# Patient Record
Sex: MALE | Race: ASIAN | NOT HISPANIC OR LATINO | Employment: UNEMPLOYED | URBAN - METROPOLITAN AREA
[De-identification: names, ages, dates, MRNs, and addresses within clinical notes are randomized per-mention and may not be internally consistent; named-entity substitution may affect disease eponyms.]

---

## 2017-01-04 ENCOUNTER — OFFICE VISIT - HEALTHEAST (OUTPATIENT)
Dept: FAMILY MEDICINE | Facility: CLINIC | Age: 57
End: 2017-01-04

## 2017-01-04 DIAGNOSIS — K59.00 CONSTIPATION: ICD-10-CM

## 2017-01-04 DIAGNOSIS — Z09 HOSPITAL DISCHARGE FOLLOW-UP: ICD-10-CM

## 2017-01-11 ENCOUNTER — OFFICE VISIT - HEALTHEAST (OUTPATIENT)
Dept: FAMILY MEDICINE | Facility: CLINIC | Age: 57
End: 2017-01-11

## 2017-01-11 DIAGNOSIS — M25.40 JOINT EFFUSION: ICD-10-CM

## 2017-01-17 ENCOUNTER — OFFICE VISIT - HEALTHEAST (OUTPATIENT)
Dept: FAMILY MEDICINE | Facility: CLINIC | Age: 57
End: 2017-01-17

## 2017-01-17 DIAGNOSIS — M19.049: ICD-10-CM

## 2017-01-17 DIAGNOSIS — Z23 NEED FOR IMMUNIZATION AGAINST INFLUENZA: ICD-10-CM

## 2017-01-17 ASSESSMENT — MIFFLIN-ST. JEOR: SCORE: 1436.35

## 2017-01-26 ENCOUNTER — COMMUNICATION - HEALTHEAST (OUTPATIENT)
Dept: SCHEDULING | Facility: CLINIC | Age: 57
End: 2017-01-26

## 2017-01-26 ENCOUNTER — OFFICE VISIT - HEALTHEAST (OUTPATIENT)
Dept: FAMILY MEDICINE | Facility: CLINIC | Age: 57
End: 2017-01-26

## 2017-01-26 DIAGNOSIS — M25.50 ARTHRALGIA: ICD-10-CM

## 2017-01-26 DIAGNOSIS — K21.9 GASTROESOPHAGEAL REFLUX DISEASE, ESOPHAGITIS PRESENCE NOT SPECIFIED: ICD-10-CM

## 2017-01-26 DIAGNOSIS — E55.9 VITAMIN D DEFICIENCY: ICD-10-CM

## 2017-01-26 ASSESSMENT — MIFFLIN-ST. JEOR: SCORE: 1429.55

## 2017-01-27 ENCOUNTER — COMMUNICATION - HEALTHEAST (OUTPATIENT)
Dept: FAMILY MEDICINE | Facility: CLINIC | Age: 57
End: 2017-01-27

## 2017-02-13 ENCOUNTER — OFFICE VISIT - HEALTHEAST (OUTPATIENT)
Dept: FAMILY MEDICINE | Facility: CLINIC | Age: 57
End: 2017-02-13

## 2017-02-13 DIAGNOSIS — M19.049: ICD-10-CM

## 2017-02-13 DIAGNOSIS — Z23 NEED FOR TDAP VACCINATION: ICD-10-CM

## 2017-02-22 ENCOUNTER — OFFICE VISIT - HEALTHEAST (OUTPATIENT)
Dept: FAMILY MEDICINE | Facility: CLINIC | Age: 57
End: 2017-02-22

## 2017-02-22 DIAGNOSIS — M19.049: ICD-10-CM

## 2017-03-16 ENCOUNTER — OFFICE VISIT - HEALTHEAST (OUTPATIENT)
Dept: FAMILY MEDICINE | Facility: CLINIC | Age: 57
End: 2017-03-16

## 2017-03-16 DIAGNOSIS — M25.50 JOINT PAIN: ICD-10-CM

## 2017-04-05 ENCOUNTER — OFFICE VISIT - HEALTHEAST (OUTPATIENT)
Dept: RHEUMATOLOGY | Facility: CLINIC | Age: 57
End: 2017-04-05

## 2017-04-05 DIAGNOSIS — M25.50 POLYARTHRALGIA: ICD-10-CM

## 2017-04-05 DIAGNOSIS — M79.642 PAIN OF LEFT HAND: ICD-10-CM

## 2017-04-05 ASSESSMENT — MIFFLIN-ST. JEOR: SCORE: 1417.3

## 2017-04-06 LAB — HCV AB SERPL QL IA: NEGATIVE

## 2017-04-24 ENCOUNTER — OFFICE VISIT - HEALTHEAST (OUTPATIENT)
Dept: FAMILY MEDICINE | Facility: CLINIC | Age: 57
End: 2017-04-24

## 2017-04-24 DIAGNOSIS — K21.9 GASTROESOPHAGEAL REFLUX DISEASE, ESOPHAGITIS PRESENCE NOT SPECIFIED: ICD-10-CM

## 2017-04-24 DIAGNOSIS — K76.0 HEPATIC STEATOSIS: ICD-10-CM

## 2017-04-24 DIAGNOSIS — R10.11 RIGHT UPPER QUADRANT ABDOMINAL PAIN: ICD-10-CM

## 2017-04-25 LAB — HBV SURFACE AB SERPL IA-ACNC: POSITIVE M[IU]/ML

## 2017-05-01 LAB — HAV IGG SER QL IA: POSITIVE

## 2017-05-03 ENCOUNTER — OFFICE VISIT - HEALTHEAST (OUTPATIENT)
Dept: RHEUMATOLOGY | Facility: CLINIC | Age: 57
End: 2017-05-03

## 2017-05-03 DIAGNOSIS — M06.4 INFLAMMATORY POLYARTHROPATHY OF HAND (H): ICD-10-CM

## 2017-05-03 DIAGNOSIS — M79.642 PAIN OF LEFT HAND: ICD-10-CM

## 2017-05-03 DIAGNOSIS — M25.50 POLYARTHRALGIA: ICD-10-CM

## 2017-05-05 ENCOUNTER — HOSPITAL ENCOUNTER (OUTPATIENT)
Dept: ULTRASOUND IMAGING | Facility: HOSPITAL | Age: 57
Discharge: HOME | End: 2017-05-05
Attending: FAMILY MEDICINE

## 2017-05-05 DIAGNOSIS — R10.11 RIGHT UPPER QUADRANT ABDOMINAL PAIN: ICD-10-CM

## 2017-05-05 DIAGNOSIS — K76.0 HEPATIC STEATOSIS: ICD-10-CM

## 2017-05-10 ENCOUNTER — COMMUNICATION - HEALTHEAST (OUTPATIENT)
Dept: FAMILY MEDICINE | Facility: CLINIC | Age: 57
End: 2017-05-10

## 2017-05-17 ENCOUNTER — OFFICE VISIT - HEALTHEAST (OUTPATIENT)
Dept: FAMILY MEDICINE | Facility: CLINIC | Age: 57
End: 2017-05-17

## 2017-05-17 DIAGNOSIS — E78.1 HYPERTRIGLYCERIDEMIA: ICD-10-CM

## 2017-05-17 DIAGNOSIS — K76.0 HEPATIC STEATOSIS: ICD-10-CM

## 2017-05-17 DIAGNOSIS — K29.70 GASTRITIS: ICD-10-CM

## 2017-05-17 LAB
CHOLEST SERPL-MCNC: 185 MG/DL
FASTING STATUS PATIENT QL REPORTED: YES
HDLC SERPL-MCNC: 39 MG/DL
LDLC SERPL CALC-MCNC: 83 MG/DL
TRIGL SERPL-MCNC: 313 MG/DL

## 2017-05-25 ENCOUNTER — OFFICE VISIT - HEALTHEAST (OUTPATIENT)
Dept: FAMILY MEDICINE | Facility: CLINIC | Age: 57
End: 2017-05-25

## 2017-05-25 DIAGNOSIS — R10.11 RIGHT UPPER QUADRANT ABDOMINAL PAIN: ICD-10-CM

## 2017-05-25 DIAGNOSIS — Z12.11 COLON CANCER SCREENING: ICD-10-CM

## 2017-06-01 ENCOUNTER — RECORDS - HEALTHEAST (OUTPATIENT)
Dept: ADMINISTRATIVE | Facility: OTHER | Age: 57
End: 2017-06-01

## 2017-06-02 ENCOUNTER — RECORDS - HEALTHEAST (OUTPATIENT)
Dept: ADMINISTRATIVE | Facility: OTHER | Age: 57
End: 2017-06-02

## 2017-06-12 ENCOUNTER — OFFICE VISIT - HEALTHEAST (OUTPATIENT)
Dept: FAMILY MEDICINE | Facility: CLINIC | Age: 57
End: 2017-06-12

## 2017-06-12 DIAGNOSIS — D12.6 ADENOMATOUS COLON POLYP: ICD-10-CM

## 2017-06-12 DIAGNOSIS — M06.4 INFLAMMATORY POLYARTHROPATHY OF HAND (H): ICD-10-CM

## 2017-06-12 DIAGNOSIS — K59.00 CONSTIPATION: ICD-10-CM

## 2017-06-12 ASSESSMENT — MIFFLIN-ST. JEOR: SCORE: 1481.71

## 2017-08-07 ENCOUNTER — OFFICE VISIT - HEALTHEAST (OUTPATIENT)
Dept: RHEUMATOLOGY | Facility: CLINIC | Age: 57
End: 2017-08-07

## 2017-08-07 DIAGNOSIS — M06.4 INFLAMMATORY POLYARTHROPATHY OF HAND (H): ICD-10-CM

## 2017-08-07 ASSESSMENT — MIFFLIN-ST. JEOR: SCORE: 1481.71

## 2017-09-11 ENCOUNTER — RECORDS - HEALTHEAST (OUTPATIENT)
Dept: ADMINISTRATIVE | Facility: OTHER | Age: 57
End: 2017-09-11

## 2017-09-27 ENCOUNTER — OFFICE VISIT - HEALTHEAST (OUTPATIENT)
Dept: FAMILY MEDICINE | Facility: CLINIC | Age: 57
End: 2017-09-27

## 2017-09-27 DIAGNOSIS — E55.9 VITAMIN D DEFICIENCY: ICD-10-CM

## 2017-09-27 DIAGNOSIS — K29.70 GASTRITIS: ICD-10-CM

## 2017-09-27 DIAGNOSIS — K59.00 CONSTIPATION: ICD-10-CM

## 2017-09-27 DIAGNOSIS — E78.1 HYPERTRIGLYCERIDEMIA: ICD-10-CM

## 2017-09-27 DIAGNOSIS — D12.6 ADENOMATOUS COLON POLYP: ICD-10-CM

## 2017-09-28 ENCOUNTER — AMBULATORY - HEALTHEAST (OUTPATIENT)
Dept: LAB | Facility: CLINIC | Age: 57
End: 2017-09-28

## 2017-09-28 DIAGNOSIS — E78.1 HYPERTRIGLYCERIDEMIA: ICD-10-CM

## 2017-09-28 LAB
CHOLEST SERPL-MCNC: 180 MG/DL
FASTING STATUS PATIENT QL REPORTED: YES
HDLC SERPL-MCNC: 44 MG/DL
LDLC SERPL CALC-MCNC: 91 MG/DL
TRIGL SERPL-MCNC: 227 MG/DL

## 2017-10-02 ENCOUNTER — AMBULATORY - HEALTHEAST (OUTPATIENT)
Dept: FAMILY MEDICINE | Facility: CLINIC | Age: 57
End: 2017-10-02

## 2017-10-02 DIAGNOSIS — K59.00 CONSTIPATION: ICD-10-CM

## 2017-10-02 DIAGNOSIS — E55.9 VITAMIN D DEFICIENCY: ICD-10-CM

## 2017-10-17 ENCOUNTER — COMMUNICATION - HEALTHEAST (OUTPATIENT)
Dept: FAMILY MEDICINE | Facility: CLINIC | Age: 57
End: 2017-10-17

## 2017-11-09 ENCOUNTER — COMMUNICATION - HEALTHEAST (OUTPATIENT)
Dept: RHEUMATOLOGY | Facility: CLINIC | Age: 57
End: 2017-11-09

## 2017-11-09 DIAGNOSIS — M06.4 INFLAMMATORY POLYARTHROPATHY OF HAND (H): ICD-10-CM

## 2017-12-14 ENCOUNTER — OFFICE VISIT - HEALTHEAST (OUTPATIENT)
Dept: FAMILY MEDICINE | Facility: CLINIC | Age: 57
End: 2017-12-14

## 2017-12-14 DIAGNOSIS — K62.5 RECTAL BLEEDING: ICD-10-CM

## 2017-12-14 DIAGNOSIS — K64.5 THROMBOSED EXTERNAL HEMORRHOID: ICD-10-CM

## 2017-12-14 ASSESSMENT — MIFFLIN-ST. JEOR: SCORE: 1517.99

## 2017-12-15 ENCOUNTER — RECORDS - HEALTHEAST (OUTPATIENT)
Dept: ADMINISTRATIVE | Facility: OTHER | Age: 57
End: 2017-12-15

## 2017-12-30 ENCOUNTER — COMMUNICATION - HEALTHEAST (OUTPATIENT)
Dept: FAMILY MEDICINE | Facility: CLINIC | Age: 57
End: 2017-12-30

## 2017-12-30 DIAGNOSIS — K59.00 CONSTIPATION: ICD-10-CM

## 2018-01-04 ENCOUNTER — COMMUNICATION - HEALTHEAST (OUTPATIENT)
Dept: FAMILY MEDICINE | Facility: CLINIC | Age: 58
End: 2018-01-04

## 2018-01-04 DIAGNOSIS — K59.00 CONSTIPATION: ICD-10-CM

## 2018-01-15 ENCOUNTER — RECORDS - HEALTHEAST (OUTPATIENT)
Dept: ADMINISTRATIVE | Facility: OTHER | Age: 58
End: 2018-01-15

## 2018-02-05 ENCOUNTER — OFFICE VISIT - HEALTHEAST (OUTPATIENT)
Dept: RHEUMATOLOGY | Facility: CLINIC | Age: 58
End: 2018-02-05

## 2018-02-05 DIAGNOSIS — M06.4 INFLAMMATORY POLYARTHROPATHY OF HAND (H): ICD-10-CM

## 2018-02-05 DIAGNOSIS — M25.50 POLYARTHRALGIA: ICD-10-CM

## 2018-02-05 ASSESSMENT — MIFFLIN-ST. JEOR: SCORE: 1517.99

## 2018-02-14 ENCOUNTER — OFFICE VISIT - HEALTHEAST (OUTPATIENT)
Dept: FAMILY MEDICINE | Facility: CLINIC | Age: 58
End: 2018-02-14

## 2018-02-14 DIAGNOSIS — K21.9 GASTROESOPHAGEAL REFLUX DISEASE, ESOPHAGITIS PRESENCE NOT SPECIFIED: ICD-10-CM

## 2018-02-14 DIAGNOSIS — H54.7 DECREASED VISION: ICD-10-CM

## 2018-02-14 DIAGNOSIS — M25.50 POLYARTHRALGIA: ICD-10-CM

## 2018-03-09 ENCOUNTER — RECORDS - HEALTHEAST (OUTPATIENT)
Dept: ADMINISTRATIVE | Facility: OTHER | Age: 58
End: 2018-03-09

## 2018-03-29 ENCOUNTER — RECORDS - HEALTHEAST (OUTPATIENT)
Dept: ADMINISTRATIVE | Facility: OTHER | Age: 58
End: 2018-03-29

## 2018-04-03 ENCOUNTER — OFFICE VISIT - HEALTHEAST (OUTPATIENT)
Dept: FAMILY MEDICINE | Facility: CLINIC | Age: 58
End: 2018-04-03

## 2018-04-03 DIAGNOSIS — E55.9 VITAMIN D DEFICIENCY: ICD-10-CM

## 2018-04-03 DIAGNOSIS — M06.4 INFLAMMATORY POLYARTHROPATHY OF HAND (H): ICD-10-CM

## 2018-04-03 DIAGNOSIS — K29.70 GASTRITIS: ICD-10-CM

## 2018-04-03 DIAGNOSIS — M17.11 PRIMARY OSTEOARTHRITIS OF RIGHT KNEE: ICD-10-CM

## 2018-04-03 DIAGNOSIS — M17.12 PRIMARY OSTEOARTHRITIS OF LEFT KNEE: ICD-10-CM

## 2018-04-30 ENCOUNTER — OFFICE VISIT - HEALTHEAST (OUTPATIENT)
Dept: FAMILY MEDICINE | Facility: CLINIC | Age: 58
End: 2018-04-30

## 2018-04-30 DIAGNOSIS — M67.911 ROTATOR CUFF DISORDER, RIGHT: ICD-10-CM

## 2018-04-30 DIAGNOSIS — M25.50 POLYARTHRALGIA: ICD-10-CM

## 2018-05-22 ENCOUNTER — RECORDS - HEALTHEAST (OUTPATIENT)
Dept: GENERAL RADIOLOGY | Facility: CLINIC | Age: 58
End: 2018-05-22

## 2018-05-22 ENCOUNTER — OFFICE VISIT - HEALTHEAST (OUTPATIENT)
Dept: FAMILY MEDICINE | Facility: CLINIC | Age: 58
End: 2018-05-22

## 2018-05-22 DIAGNOSIS — M67.911 ROTATOR CUFF DISORDER, RIGHT: ICD-10-CM

## 2018-05-22 DIAGNOSIS — M67.911 UNSPECIFIED DISORDER OF SYNOVIUM AND TENDON, RIGHT SHOULDER: ICD-10-CM

## 2018-06-01 ENCOUNTER — OFFICE VISIT - HEALTHEAST (OUTPATIENT)
Dept: PHYSICAL THERAPY | Facility: REHABILITATION | Age: 58
End: 2018-06-01

## 2018-06-01 DIAGNOSIS — R20.0 RIGHT ARM NUMBNESS: ICD-10-CM

## 2018-06-01 DIAGNOSIS — M79.601 RIGHT ARM PAIN: ICD-10-CM

## 2018-06-01 DIAGNOSIS — M62.81 GENERALIZED MUSCLE WEAKNESS: ICD-10-CM

## 2018-06-01 DIAGNOSIS — R29.3 POOR POSTURE: ICD-10-CM

## 2018-06-07 ENCOUNTER — OFFICE VISIT - HEALTHEAST (OUTPATIENT)
Dept: RHEUMATOLOGY | Facility: CLINIC | Age: 58
End: 2018-06-07

## 2018-06-07 DIAGNOSIS — M06.4 INFLAMMATORY POLYARTHROPATHY OF HAND (H): ICD-10-CM

## 2018-06-07 DIAGNOSIS — M77.8 RIGHT SHOULDER TENDINITIS: ICD-10-CM

## 2018-06-08 ENCOUNTER — OFFICE VISIT - HEALTHEAST (OUTPATIENT)
Dept: PHYSICAL THERAPY | Facility: REHABILITATION | Age: 58
End: 2018-06-08

## 2018-06-08 DIAGNOSIS — M62.81 GENERALIZED MUSCLE WEAKNESS: ICD-10-CM

## 2018-06-08 DIAGNOSIS — R29.3 POOR POSTURE: ICD-10-CM

## 2018-06-08 DIAGNOSIS — R20.0 RIGHT ARM NUMBNESS: ICD-10-CM

## 2018-06-08 DIAGNOSIS — M79.601 RIGHT ARM PAIN: ICD-10-CM

## 2018-06-15 ENCOUNTER — OFFICE VISIT - HEALTHEAST (OUTPATIENT)
Dept: PHYSICAL THERAPY | Facility: REHABILITATION | Age: 58
End: 2018-06-15

## 2018-06-15 DIAGNOSIS — M62.81 GENERALIZED MUSCLE WEAKNESS: ICD-10-CM

## 2018-06-15 DIAGNOSIS — R29.3 POOR POSTURE: ICD-10-CM

## 2018-06-15 DIAGNOSIS — R20.0 RIGHT ARM NUMBNESS: ICD-10-CM

## 2018-06-15 DIAGNOSIS — M79.601 RIGHT ARM PAIN: ICD-10-CM

## 2018-06-29 ENCOUNTER — OFFICE VISIT - HEALTHEAST (OUTPATIENT)
Dept: PHYSICAL THERAPY | Facility: REHABILITATION | Age: 58
End: 2018-06-29

## 2018-06-29 DIAGNOSIS — R20.0 RIGHT ARM NUMBNESS: ICD-10-CM

## 2018-06-29 DIAGNOSIS — R29.3 POOR POSTURE: ICD-10-CM

## 2018-06-29 DIAGNOSIS — M62.81 GENERALIZED MUSCLE WEAKNESS: ICD-10-CM

## 2018-06-29 DIAGNOSIS — M79.601 RIGHT ARM PAIN: ICD-10-CM

## 2018-08-06 ENCOUNTER — COMMUNICATION - HEALTHEAST (OUTPATIENT)
Dept: RHEUMATOLOGY | Facility: CLINIC | Age: 58
End: 2018-08-06

## 2018-08-06 DIAGNOSIS — M25.50 POLYARTHRALGIA: ICD-10-CM

## 2018-08-06 DIAGNOSIS — M06.4 INFLAMMATORY POLYARTHROPATHY OF HAND (H): ICD-10-CM

## 2018-08-09 ENCOUNTER — COMMUNICATION - HEALTHEAST (OUTPATIENT)
Dept: FAMILY MEDICINE | Facility: CLINIC | Age: 58
End: 2018-08-09

## 2018-08-09 DIAGNOSIS — M06.4 INFLAMMATORY POLYARTHROPATHY OF HAND (H): ICD-10-CM

## 2018-08-13 ENCOUNTER — COMMUNICATION - HEALTHEAST (OUTPATIENT)
Dept: RHEUMATOLOGY | Facility: CLINIC | Age: 58
End: 2018-08-13

## 2018-08-13 DIAGNOSIS — M06.4 INFLAMMATORY POLYARTHROPATHY OF HAND (H): ICD-10-CM

## 2018-08-13 DIAGNOSIS — M25.50 POLYARTHRALGIA: ICD-10-CM

## 2018-08-14 ENCOUNTER — RECORDS - HEALTHEAST (OUTPATIENT)
Dept: ADMINISTRATIVE | Facility: OTHER | Age: 58
End: 2018-08-14

## 2018-09-06 ENCOUNTER — OFFICE VISIT - HEALTHEAST (OUTPATIENT)
Dept: RHEUMATOLOGY | Facility: CLINIC | Age: 58
End: 2018-09-06

## 2018-09-06 DIAGNOSIS — M25.50 POLYARTHRALGIA: ICD-10-CM

## 2018-09-06 DIAGNOSIS — M06.4 INFLAMMATORY POLYARTHROPATHY OF HAND (H): ICD-10-CM

## 2018-09-06 LAB
ALBUMIN SERPL-MCNC: 4.3 G/DL (ref 3.5–5)
ALT SERPL W P-5'-P-CCNC: 26 U/L (ref 0–45)
CREAT SERPL-MCNC: 1.13 MG/DL (ref 0.7–1.3)
ERYTHROCYTE [DISTWIDTH] IN BLOOD BY AUTOMATED COUNT: 11.5 % (ref 11–14.5)
GFR SERPL CREATININE-BSD FRML MDRD: >60 ML/MIN/1.73M2
HCT VFR BLD AUTO: 43 % (ref 40–54)
HGB BLD-MCNC: 14.8 G/DL (ref 14–18)
MCH RBC QN AUTO: 29.1 PG (ref 27–34)
MCHC RBC AUTO-ENTMCNC: 34.4 G/DL (ref 32–36)
MCV RBC AUTO: 84 FL (ref 80–100)
PLATELET # BLD AUTO: 179 THOU/UL (ref 140–440)
PMV BLD AUTO: 9.3 FL (ref 7–10)
RBC # BLD AUTO: 5.08 MILL/UL (ref 4.4–6.2)
WBC: 5.1 THOU/UL (ref 4–11)

## 2018-10-08 ENCOUNTER — COMMUNICATION - HEALTHEAST (OUTPATIENT)
Dept: FAMILY MEDICINE | Facility: CLINIC | Age: 58
End: 2018-10-08

## 2018-10-08 DIAGNOSIS — K59.00 CONSTIPATION: ICD-10-CM

## 2018-10-09 ENCOUNTER — OFFICE VISIT - HEALTHEAST (OUTPATIENT)
Dept: FAMILY MEDICINE | Facility: CLINIC | Age: 58
End: 2018-10-09

## 2018-10-09 DIAGNOSIS — D12.6 ADENOMATOUS COLON POLYP: ICD-10-CM

## 2018-10-09 DIAGNOSIS — M25.50 POLYARTHRALGIA: ICD-10-CM

## 2018-10-09 DIAGNOSIS — K29.70 GASTRITIS: ICD-10-CM

## 2018-10-09 DIAGNOSIS — M06.4 INFLAMMATORY POLYARTHROPATHY OF HAND (H): ICD-10-CM

## 2018-11-12 ENCOUNTER — OFFICE VISIT - HEALTHEAST (OUTPATIENT)
Dept: FAMILY MEDICINE | Facility: CLINIC | Age: 58
End: 2018-11-12

## 2018-11-12 DIAGNOSIS — D12.6 ADENOMATOUS POLYP OF COLON, UNSPECIFIED PART OF COLON: ICD-10-CM

## 2018-11-12 DIAGNOSIS — E55.9 VITAMIN D DEFICIENCY: ICD-10-CM

## 2018-11-12 DIAGNOSIS — K76.0 HEPATIC STEATOSIS: ICD-10-CM

## 2018-11-12 DIAGNOSIS — M06.4 INFLAMMATORY POLYARTHROPATHY OF HAND (H): ICD-10-CM

## 2018-11-12 DIAGNOSIS — K59.00 CONSTIPATION, UNSPECIFIED CONSTIPATION TYPE: ICD-10-CM

## 2018-11-12 DIAGNOSIS — Z00.00 ROUTINE GENERAL MEDICAL EXAMINATION AT A HEALTH CARE FACILITY: ICD-10-CM

## 2018-11-12 DIAGNOSIS — K29.50 CHRONIC GASTRITIS WITHOUT BLEEDING, UNSPECIFIED GASTRITIS TYPE: ICD-10-CM

## 2018-11-12 LAB
ALBUMIN SERPL-MCNC: 4.1 G/DL (ref 3.5–5)
ALP SERPL-CCNC: 61 U/L (ref 45–120)
ALT SERPL W P-5'-P-CCNC: 31 U/L (ref 0–45)
ANION GAP SERPL CALCULATED.3IONS-SCNC: 10 MMOL/L (ref 5–18)
AST SERPL W P-5'-P-CCNC: 23 U/L (ref 0–40)
BILIRUB SERPL-MCNC: 0.5 MG/DL (ref 0–1)
BUN SERPL-MCNC: 15 MG/DL (ref 8–22)
CALCIUM SERPL-MCNC: 9.4 MG/DL (ref 8.5–10.5)
CHLORIDE BLD-SCNC: 106 MMOL/L (ref 98–107)
CHOLEST SERPL-MCNC: 167 MG/DL
CO2 SERPL-SCNC: 24 MMOL/L (ref 22–31)
CREAT SERPL-MCNC: 1.07 MG/DL (ref 0.7–1.3)
ERYTHROCYTE [DISTWIDTH] IN BLOOD BY AUTOMATED COUNT: 12.3 % (ref 11–14.5)
FASTING STATUS PATIENT QL REPORTED: YES
GFR SERPL CREATININE-BSD FRML MDRD: >60 ML/MIN/1.73M2
GLUCOSE BLD-MCNC: 95 MG/DL (ref 70–125)
HCT VFR BLD AUTO: 43.5 % (ref 40–54)
HDLC SERPL-MCNC: 31 MG/DL
HGB BLD-MCNC: 14.2 G/DL (ref 14–18)
LDLC SERPL CALC-MCNC: 81 MG/DL
MCH RBC QN AUTO: 27.9 PG (ref 27–34)
MCHC RBC AUTO-ENTMCNC: 32.8 G/DL (ref 32–36)
MCV RBC AUTO: 85 FL (ref 80–100)
PLATELET # BLD AUTO: 169 THOU/UL (ref 140–440)
PMV BLD AUTO: 10.4 FL (ref 7–10)
POTASSIUM BLD-SCNC: 4.3 MMOL/L (ref 3.5–5)
PROT SERPL-MCNC: 7.1 G/DL (ref 6–8)
PSA SERPL-MCNC: 1.5 NG/ML (ref 0–3.5)
RBC # BLD AUTO: 5.1 MILL/UL (ref 4.4–6.2)
SODIUM SERPL-SCNC: 140 MMOL/L (ref 136–145)
TRIGL SERPL-MCNC: 274 MG/DL
WBC: 6 THOU/UL (ref 4–11)

## 2018-11-12 ASSESSMENT — MIFFLIN-ST. JEOR: SCORE: 1508.92

## 2018-11-13 LAB — 25(OH)D3 SERPL-MCNC: 46.9 NG/ML (ref 30–80)

## 2019-01-10 ENCOUNTER — OFFICE VISIT - HEALTHEAST (OUTPATIENT)
Dept: RHEUMATOLOGY | Facility: CLINIC | Age: 59
End: 2019-01-10

## 2019-01-10 DIAGNOSIS — M77.8 RIGHT SHOULDER TENDINITIS: ICD-10-CM

## 2019-01-10 DIAGNOSIS — M25.50 POLYARTHRALGIA: ICD-10-CM

## 2019-01-10 DIAGNOSIS — M06.4 INFLAMMATORY POLYARTHROPATHY OF HAND (H): ICD-10-CM

## 2019-01-10 DIAGNOSIS — G56.01 RIGHT CARPAL TUNNEL SYNDROME: ICD-10-CM

## 2019-02-25 ENCOUNTER — COMMUNICATION - HEALTHEAST (OUTPATIENT)
Dept: FAMILY MEDICINE | Facility: CLINIC | Age: 59
End: 2019-02-25

## 2019-02-25 DIAGNOSIS — K29.70 GASTRITIS: ICD-10-CM

## 2019-04-01 ENCOUNTER — COMMUNICATION - HEALTHEAST (OUTPATIENT)
Dept: FAMILY MEDICINE | Facility: CLINIC | Age: 59
End: 2019-04-01

## 2019-04-01 DIAGNOSIS — M06.4 INFLAMMATORY POLYARTHROPATHY OF HAND (H): ICD-10-CM

## 2019-04-02 ENCOUNTER — OFFICE VISIT - HEALTHEAST (OUTPATIENT)
Dept: RHEUMATOLOGY | Facility: CLINIC | Age: 59
End: 2019-04-02

## 2019-04-02 DIAGNOSIS — M77.8 RIGHT SHOULDER TENDINITIS: ICD-10-CM

## 2019-04-02 DIAGNOSIS — M06.4 INFLAMMATORY POLYARTHROPATHY OF HAND (H): ICD-10-CM

## 2019-04-02 DIAGNOSIS — M25.50 POLYARTHRALGIA: ICD-10-CM

## 2019-04-02 DIAGNOSIS — G56.01 RIGHT CARPAL TUNNEL SYNDROME: ICD-10-CM

## 2019-04-04 ENCOUNTER — COMMUNICATION - HEALTHEAST (OUTPATIENT)
Dept: SCHEDULING | Facility: CLINIC | Age: 59
End: 2019-04-04

## 2019-04-08 ENCOUNTER — OFFICE VISIT - HEALTHEAST (OUTPATIENT)
Dept: FAMILY MEDICINE | Facility: CLINIC | Age: 59
End: 2019-04-08

## 2019-04-08 DIAGNOSIS — M25.50 POLYARTHRALGIA: ICD-10-CM

## 2019-04-08 DIAGNOSIS — R07.89 CHEST WALL PAIN: ICD-10-CM

## 2019-04-11 ENCOUNTER — COMMUNICATION - HEALTHEAST (OUTPATIENT)
Dept: FAMILY MEDICINE | Facility: CLINIC | Age: 59
End: 2019-04-11

## 2019-04-11 DIAGNOSIS — E55.9 VITAMIN D DEFICIENCY: ICD-10-CM

## 2019-04-15 ENCOUNTER — RECORDS - HEALTHEAST (OUTPATIENT)
Dept: ADMINISTRATIVE | Facility: OTHER | Age: 59
End: 2019-04-15

## 2019-05-07 ENCOUNTER — OFFICE VISIT - HEALTHEAST (OUTPATIENT)
Dept: FAMILY MEDICINE | Facility: CLINIC | Age: 59
End: 2019-05-07

## 2019-05-07 DIAGNOSIS — K29.50 CHRONIC GASTRITIS WITHOUT BLEEDING, UNSPECIFIED GASTRITIS TYPE: ICD-10-CM

## 2019-05-07 DIAGNOSIS — D12.6 ADENOMATOUS POLYP OF COLON, UNSPECIFIED PART OF COLON: ICD-10-CM

## 2019-05-08 LAB
H PYLORI AG STL QL IA: NORMAL
REPORT STATUS: NORMAL
SPECIMEN DESCRIPTION: NORMAL

## 2019-05-13 ENCOUNTER — COMMUNICATION - HEALTHEAST (OUTPATIENT)
Dept: FAMILY MEDICINE | Facility: CLINIC | Age: 59
End: 2019-05-13

## 2019-05-13 DIAGNOSIS — K29.50 CHRONIC GASTRITIS WITHOUT BLEEDING, UNSPECIFIED GASTRITIS TYPE: ICD-10-CM

## 2019-06-24 ENCOUNTER — OFFICE VISIT - HEALTHEAST (OUTPATIENT)
Dept: FAMILY MEDICINE | Facility: CLINIC | Age: 59
End: 2019-06-24

## 2019-06-24 DIAGNOSIS — K21.9 GASTROESOPHAGEAL REFLUX DISEASE, ESOPHAGITIS PRESENCE NOT SPECIFIED: ICD-10-CM

## 2019-06-24 DIAGNOSIS — K59.00 CONSTIPATION, UNSPECIFIED CONSTIPATION TYPE: ICD-10-CM

## 2019-07-30 ENCOUNTER — OFFICE VISIT - HEALTHEAST (OUTPATIENT)
Dept: FAMILY MEDICINE | Facility: CLINIC | Age: 59
End: 2019-07-30

## 2019-07-30 DIAGNOSIS — R21 RASH OF NECK: ICD-10-CM

## 2019-07-30 DIAGNOSIS — R10.9 ABDOMINAL PAIN, UNSPECIFIED ABDOMINAL LOCATION: ICD-10-CM

## 2019-07-30 DIAGNOSIS — K21.9 GASTROESOPHAGEAL REFLUX DISEASE, ESOPHAGITIS PRESENCE NOT SPECIFIED: ICD-10-CM

## 2019-08-05 ENCOUNTER — OFFICE VISIT - HEALTHEAST (OUTPATIENT)
Dept: RHEUMATOLOGY | Facility: CLINIC | Age: 59
End: 2019-08-05

## 2019-08-05 DIAGNOSIS — M06.4 INFLAMMATORY POLYARTHROPATHY OF HAND (H): ICD-10-CM

## 2019-08-05 DIAGNOSIS — M25.50 POLYARTHRALGIA: ICD-10-CM

## 2019-08-05 ASSESSMENT — MIFFLIN-ST. JEOR: SCORE: 1443.15

## 2019-09-06 ENCOUNTER — RECORDS - HEALTHEAST (OUTPATIENT)
Dept: ADMINISTRATIVE | Facility: OTHER | Age: 59
End: 2019-09-06

## 2019-09-09 ENCOUNTER — COMMUNICATION - HEALTHEAST (OUTPATIENT)
Dept: ADMINISTRATIVE | Facility: CLINIC | Age: 59
End: 2019-09-09

## 2019-09-26 ENCOUNTER — OFFICE VISIT - HEALTHEAST (OUTPATIENT)
Dept: RHEUMATOLOGY | Facility: CLINIC | Age: 59
End: 2019-09-26

## 2019-09-26 DIAGNOSIS — Z79.899 HIGH RISK MEDICATION USE: ICD-10-CM

## 2019-09-26 DIAGNOSIS — R25.1 TREMOR: ICD-10-CM

## 2019-09-26 DIAGNOSIS — M25.50 POLYARTHRALGIA: ICD-10-CM

## 2019-09-26 DIAGNOSIS — M06.4 INFLAMMATORY POLYARTHROPATHY OF HAND (H): ICD-10-CM

## 2019-09-26 ASSESSMENT — MIFFLIN-ST. JEOR: SCORE: 1443.15

## 2019-10-07 ENCOUNTER — COMMUNICATION - HEALTHEAST (OUTPATIENT)
Dept: FAMILY MEDICINE | Facility: CLINIC | Age: 59
End: 2019-10-07

## 2019-10-07 DIAGNOSIS — K59.00 CONSTIPATION: ICD-10-CM

## 2019-10-16 ENCOUNTER — OFFICE VISIT - HEALTHEAST (OUTPATIENT)
Dept: FAMILY MEDICINE | Facility: CLINIC | Age: 59
End: 2019-10-16

## 2019-10-16 DIAGNOSIS — M06.4 INFLAMMATORY POLYARTHROPATHY OF HAND (H): ICD-10-CM

## 2019-10-16 DIAGNOSIS — G25.2 INTENTION TREMOR: ICD-10-CM

## 2019-10-16 DIAGNOSIS — K29.50 CHRONIC GASTRITIS WITHOUT BLEEDING, UNSPECIFIED GASTRITIS TYPE: ICD-10-CM

## 2019-11-20 ENCOUNTER — RECORDS - HEALTHEAST (OUTPATIENT)
Dept: ADMINISTRATIVE | Facility: OTHER | Age: 59
End: 2019-11-20

## 2019-12-02 ENCOUNTER — HOSPITAL ENCOUNTER (OUTPATIENT)
Dept: MRI IMAGING | Facility: HOSPITAL | Age: 59
Discharge: HOME | End: 2019-12-02
Attending: PSYCHIATRY & NEUROLOGY

## 2019-12-02 DIAGNOSIS — G25.0 ESSENTIAL TREMOR: ICD-10-CM

## 2019-12-02 DIAGNOSIS — R29.898 RIGHT LEG WEAKNESS: ICD-10-CM

## 2019-12-02 DIAGNOSIS — H49.20 6TH NERVE PALSY: ICD-10-CM

## 2019-12-03 ENCOUNTER — RECORDS - HEALTHEAST (OUTPATIENT)
Dept: ADMINISTRATIVE | Facility: OTHER | Age: 59
End: 2019-12-03

## 2019-12-13 ENCOUNTER — COMMUNICATION - HEALTHEAST (OUTPATIENT)
Dept: FAMILY MEDICINE | Facility: CLINIC | Age: 59
End: 2019-12-13

## 2019-12-13 DIAGNOSIS — M06.4 INFLAMMATORY POLYARTHROPATHY OF HAND (H): ICD-10-CM

## 2020-01-28 ENCOUNTER — OFFICE VISIT - HEALTHEAST (OUTPATIENT)
Dept: RHEUMATOLOGY | Facility: CLINIC | Age: 60
End: 2020-01-28

## 2020-01-28 DIAGNOSIS — Z79.899 HIGH RISK MEDICATION USE: ICD-10-CM

## 2020-01-28 DIAGNOSIS — M06.4 INFLAMMATORY POLYARTHROPATHY OF HAND (H): ICD-10-CM

## 2020-01-28 DIAGNOSIS — M77.8 RIGHT SHOULDER TENDINITIS: ICD-10-CM

## 2020-01-28 DIAGNOSIS — M25.50 POLYARTHRALGIA: ICD-10-CM

## 2020-01-28 ASSESSMENT — MIFFLIN-ST. JEOR: SCORE: 1443.15

## 2020-02-04 ENCOUNTER — AMBULATORY - HEALTHEAST (OUTPATIENT)
Dept: FAMILY MEDICINE | Facility: CLINIC | Age: 60
End: 2020-02-04

## 2020-02-04 DIAGNOSIS — M25.50 POLYARTHRALGIA: ICD-10-CM

## 2020-04-06 ENCOUNTER — COMMUNICATION - HEALTHEAST (OUTPATIENT)
Dept: FAMILY MEDICINE | Facility: CLINIC | Age: 60
End: 2020-04-06

## 2020-04-09 ENCOUNTER — OFFICE VISIT - HEALTHEAST (OUTPATIENT)
Dept: FAMILY MEDICINE | Facility: CLINIC | Age: 60
End: 2020-04-09

## 2020-04-09 DIAGNOSIS — R10.30 LOWER ABDOMINAL PAIN: ICD-10-CM

## 2020-04-09 DIAGNOSIS — K64.5 THROMBOSED EXTERNAL HEMORRHOIDS: ICD-10-CM

## 2020-04-13 ENCOUNTER — COMMUNICATION - HEALTHEAST (OUTPATIENT)
Dept: FAMILY MEDICINE | Facility: CLINIC | Age: 60
End: 2020-04-13

## 2020-04-17 ENCOUNTER — COMMUNICATION - HEALTHEAST (OUTPATIENT)
Dept: FAMILY MEDICINE | Facility: CLINIC | Age: 60
End: 2020-04-17

## 2020-04-17 DIAGNOSIS — E55.9 VITAMIN D DEFICIENCY: ICD-10-CM

## 2020-05-13 ENCOUNTER — COMMUNICATION - HEALTHEAST (OUTPATIENT)
Dept: RHEUMATOLOGY | Facility: CLINIC | Age: 60
End: 2020-05-13

## 2020-05-13 DIAGNOSIS — M06.4 INFLAMMATORY POLYARTHROPATHY OF HAND (H): ICD-10-CM

## 2020-05-13 DIAGNOSIS — M25.50 POLYARTHRALGIA: ICD-10-CM

## 2020-05-16 ENCOUNTER — COMMUNICATION - HEALTHEAST (OUTPATIENT)
Dept: FAMILY MEDICINE | Facility: CLINIC | Age: 60
End: 2020-05-16

## 2020-05-16 DIAGNOSIS — E55.9 VITAMIN D DEFICIENCY: ICD-10-CM

## 2020-06-12 ENCOUNTER — OFFICE VISIT - HEALTHEAST (OUTPATIENT)
Dept: FAMILY MEDICINE | Facility: CLINIC | Age: 60
End: 2020-06-12

## 2020-06-12 DIAGNOSIS — G25.2 INTENTION TREMOR: ICD-10-CM

## 2020-06-12 DIAGNOSIS — M06.4 INFLAMMATORY POLYARTHROPATHY OF HAND (H): ICD-10-CM

## 2020-06-12 DIAGNOSIS — E55.9 VITAMIN D DEFICIENCY: ICD-10-CM

## 2020-06-12 DIAGNOSIS — K59.00 CONSTIPATION, UNSPECIFIED CONSTIPATION TYPE: ICD-10-CM

## 2020-06-25 ENCOUNTER — OFFICE VISIT - HEALTHEAST (OUTPATIENT)
Dept: FAMILY MEDICINE | Facility: CLINIC | Age: 60
End: 2020-06-25

## 2020-06-25 DIAGNOSIS — D12.6 ADENOMATOUS POLYP OF COLON, UNSPECIFIED PART OF COLON: ICD-10-CM

## 2020-06-25 DIAGNOSIS — K29.50 CHRONIC GASTRITIS WITHOUT BLEEDING, UNSPECIFIED GASTRITIS TYPE: ICD-10-CM

## 2020-06-25 DIAGNOSIS — K76.0 HEPATIC STEATOSIS: ICD-10-CM

## 2020-06-25 DIAGNOSIS — K21.9 GASTROESOPHAGEAL REFLUX DISEASE, ESOPHAGITIS PRESENCE NOT SPECIFIED: ICD-10-CM

## 2020-06-25 DIAGNOSIS — K64.5 THROMBOSED EXTERNAL HEMORRHOIDS: ICD-10-CM

## 2020-06-25 DIAGNOSIS — K59.00 CONSTIPATION, UNSPECIFIED CONSTIPATION TYPE: ICD-10-CM

## 2020-06-29 ENCOUNTER — COMMUNICATION - HEALTHEAST (OUTPATIENT)
Dept: FAMILY MEDICINE | Facility: CLINIC | Age: 60
End: 2020-06-29

## 2020-06-29 DIAGNOSIS — K59.00 CONSTIPATION, UNSPECIFIED CONSTIPATION TYPE: ICD-10-CM

## 2020-07-11 ENCOUNTER — COMMUNICATION - HEALTHEAST (OUTPATIENT)
Dept: FAMILY MEDICINE | Facility: CLINIC | Age: 60
End: 2020-07-11

## 2020-07-11 DIAGNOSIS — K59.00 CONSTIPATION, UNSPECIFIED CONSTIPATION TYPE: ICD-10-CM

## 2020-07-13 ENCOUNTER — COMMUNICATION - HEALTHEAST (OUTPATIENT)
Dept: FAMILY MEDICINE | Facility: CLINIC | Age: 60
End: 2020-07-13

## 2020-07-13 DIAGNOSIS — K59.00 CONSTIPATION, UNSPECIFIED CONSTIPATION TYPE: ICD-10-CM

## 2020-07-21 ENCOUNTER — RECORDS - HEALTHEAST (OUTPATIENT)
Dept: ADMINISTRATIVE | Facility: OTHER | Age: 60
End: 2020-07-21

## 2020-07-28 ENCOUNTER — AMBULATORY - HEALTHEAST (OUTPATIENT)
Dept: LAB | Facility: CLINIC | Age: 60
End: 2020-07-28

## 2020-07-28 ENCOUNTER — OFFICE VISIT - HEALTHEAST (OUTPATIENT)
Dept: RHEUMATOLOGY | Facility: CLINIC | Age: 60
End: 2020-07-28

## 2020-07-28 DIAGNOSIS — M77.8 RIGHT SHOULDER TENDINITIS: ICD-10-CM

## 2020-07-28 DIAGNOSIS — M06.4 INFLAMMATORY POLYARTHROPATHY OF HAND (H): ICD-10-CM

## 2020-07-28 DIAGNOSIS — Z79.899 HIGH RISK MEDICATION USE: ICD-10-CM

## 2020-07-28 DIAGNOSIS — M25.50 POLYARTHRALGIA: ICD-10-CM

## 2020-07-28 LAB
ALBUMIN SERPL-MCNC: 4.4 G/DL (ref 3.5–5)
ALT SERPL W P-5'-P-CCNC: 50 U/L (ref 0–45)
CREAT SERPL-MCNC: 1.07 MG/DL (ref 0.7–1.3)
ERYTHROCYTE [DISTWIDTH] IN BLOOD BY AUTOMATED COUNT: 12.2 % (ref 11–14.5)
GFR SERPL CREATININE-BSD FRML MDRD: >60 ML/MIN/1.73M2
HCT VFR BLD AUTO: 43.5 % (ref 40–54)
HGB BLD-MCNC: 14.9 G/DL (ref 14–18)
MCH RBC QN AUTO: 29.6 PG (ref 27–34)
MCHC RBC AUTO-ENTMCNC: 34.3 G/DL (ref 32–36)
MCV RBC AUTO: 86 FL (ref 80–100)
PLATELET # BLD AUTO: 139 THOU/UL (ref 140–440)
PMV BLD AUTO: 10.9 FL (ref 7–10)
RBC # BLD AUTO: 5.04 MILL/UL (ref 4.4–6.2)
WBC: 6.9 THOU/UL (ref 4–11)

## 2020-08-03 ENCOUNTER — COMMUNICATION - HEALTHEAST (OUTPATIENT)
Dept: FAMILY MEDICINE | Facility: CLINIC | Age: 60
End: 2020-08-03

## 2020-08-03 DIAGNOSIS — K59.00 CONSTIPATION, UNSPECIFIED CONSTIPATION TYPE: ICD-10-CM

## 2020-09-10 ENCOUNTER — COMMUNICATION - HEALTHEAST (OUTPATIENT)
Dept: SCHEDULING | Facility: CLINIC | Age: 60
End: 2020-09-10

## 2020-09-11 ENCOUNTER — COMMUNICATION - HEALTHEAST (OUTPATIENT)
Dept: FAMILY MEDICINE | Facility: CLINIC | Age: 60
End: 2020-09-11

## 2020-09-11 DIAGNOSIS — E55.9 VITAMIN D DEFICIENCY: ICD-10-CM

## 2020-09-16 ENCOUNTER — OFFICE VISIT - HEALTHEAST (OUTPATIENT)
Dept: FAMILY MEDICINE | Facility: CLINIC | Age: 60
End: 2020-09-16

## 2020-09-16 DIAGNOSIS — E55.9 VITAMIN D DEFICIENCY: ICD-10-CM

## 2020-09-16 DIAGNOSIS — M06.4 INFLAMMATORY POLYARTHROPATHY OF HAND (H): ICD-10-CM

## 2020-09-16 DIAGNOSIS — K76.0 HEPATIC STEATOSIS: ICD-10-CM

## 2020-09-16 DIAGNOSIS — K59.00 CONSTIPATION, UNSPECIFIED CONSTIPATION TYPE: ICD-10-CM

## 2020-09-16 DIAGNOSIS — K59.01 SLOW TRANSIT CONSTIPATION: ICD-10-CM

## 2020-09-16 DIAGNOSIS — G25.2 INTENTION TREMOR: ICD-10-CM

## 2020-09-16 DIAGNOSIS — K29.50 CHRONIC GASTRITIS WITHOUT BLEEDING, UNSPECIFIED GASTRITIS TYPE: ICD-10-CM

## 2020-09-16 DIAGNOSIS — R10.30 LOWER ABDOMINAL PAIN: ICD-10-CM

## 2020-09-16 DIAGNOSIS — K21.9 GASTROESOPHAGEAL REFLUX DISEASE, ESOPHAGITIS PRESENCE NOT SPECIFIED: ICD-10-CM

## 2020-09-16 DIAGNOSIS — D12.6 ADENOMATOUS POLYP OF COLON, UNSPECIFIED PART OF COLON: ICD-10-CM

## 2020-09-17 ENCOUNTER — RECORDS - HEALTHEAST (OUTPATIENT)
Dept: ADMINISTRATIVE | Facility: OTHER | Age: 60
End: 2020-09-17

## 2020-09-21 ENCOUNTER — COMMUNICATION - HEALTHEAST (OUTPATIENT)
Dept: FAMILY MEDICINE | Facility: CLINIC | Age: 60
End: 2020-09-21

## 2020-09-21 DIAGNOSIS — M25.50 POLYARTHRALGIA: ICD-10-CM

## 2020-10-28 ENCOUNTER — OFFICE VISIT - HEALTHEAST (OUTPATIENT)
Dept: FAMILY MEDICINE | Facility: CLINIC | Age: 60
End: 2020-10-28

## 2020-10-28 DIAGNOSIS — K59.00 CONSTIPATION, UNSPECIFIED CONSTIPATION TYPE: ICD-10-CM

## 2020-10-28 DIAGNOSIS — M06.4 INFLAMMATORY POLYARTHROPATHY OF HAND (H): ICD-10-CM

## 2020-10-28 DIAGNOSIS — K29.50 CHRONIC GASTRITIS WITHOUT BLEEDING, UNSPECIFIED GASTRITIS TYPE: ICD-10-CM

## 2020-10-28 DIAGNOSIS — K76.0 HEPATIC STEATOSIS: ICD-10-CM

## 2020-10-29 ENCOUNTER — AMBULATORY - HEALTHEAST (OUTPATIENT)
Dept: NURSING | Facility: CLINIC | Age: 60
End: 2020-10-29

## 2020-10-29 DIAGNOSIS — Z23 NEED FOR VACCINATION: ICD-10-CM

## 2020-11-04 ENCOUNTER — COMMUNICATION - HEALTHEAST (OUTPATIENT)
Dept: FAMILY MEDICINE | Facility: CLINIC | Age: 60
End: 2020-11-04

## 2020-11-04 DIAGNOSIS — M25.50 POLYARTHRALGIA: ICD-10-CM

## 2020-12-16 ENCOUNTER — RECORDS - HEALTHEAST (OUTPATIENT)
Dept: ADMINISTRATIVE | Facility: OTHER | Age: 60
End: 2020-12-16

## 2021-01-07 ENCOUNTER — OFFICE VISIT - HEALTHEAST (OUTPATIENT)
Dept: FAMILY MEDICINE | Facility: CLINIC | Age: 61
End: 2021-01-07

## 2021-01-07 DIAGNOSIS — K59.01 SLOW TRANSIT CONSTIPATION: ICD-10-CM

## 2021-01-07 DIAGNOSIS — E55.9 VITAMIN D DEFICIENCY: ICD-10-CM

## 2021-01-07 DIAGNOSIS — K21.9 GASTROESOPHAGEAL REFLUX DISEASE, UNSPECIFIED WHETHER ESOPHAGITIS PRESENT: ICD-10-CM

## 2021-01-07 DIAGNOSIS — G25.2 INTENTION TREMOR: ICD-10-CM

## 2021-01-07 DIAGNOSIS — K29.50 CHRONIC GASTRITIS WITHOUT BLEEDING, UNSPECIFIED GASTRITIS TYPE: ICD-10-CM

## 2021-01-07 DIAGNOSIS — K59.00 CONSTIPATION, UNSPECIFIED CONSTIPATION TYPE: ICD-10-CM

## 2021-01-07 DIAGNOSIS — M06.4 INFLAMMATORY POLYARTHROPATHY OF HAND (H): ICD-10-CM

## 2021-01-07 RX ORDER — FAMOTIDINE 40 MG/1
40 TABLET, FILM COATED ORAL EVERY EVENING
Qty: 30 TABLET | Refills: 5 | Status: SHIPPED | OUTPATIENT
Start: 2021-01-07 | End: 2021-07-19

## 2021-01-07 RX ORDER — PANTOPRAZOLE SODIUM 40 MG/1
40 TABLET, DELAYED RELEASE ORAL DAILY
Qty: 30 TABLET | Refills: 5 | Status: SHIPPED | OUTPATIENT
Start: 2021-01-07 | End: 2021-07-19

## 2021-03-11 ENCOUNTER — OFFICE VISIT - HEALTHEAST (OUTPATIENT)
Dept: RHEUMATOLOGY | Facility: CLINIC | Age: 61
End: 2021-03-11

## 2021-03-11 DIAGNOSIS — Z79.899 HIGH RISK MEDICATION USE: ICD-10-CM

## 2021-03-11 DIAGNOSIS — M06.4 INFLAMMATORY POLYARTHROPATHY OF HAND (H): ICD-10-CM

## 2021-03-11 DIAGNOSIS — M25.50 POLYARTHRALGIA: ICD-10-CM

## 2021-03-11 RX ORDER — HYDROXYCHLOROQUINE SULFATE 200 MG/1
200 TABLET, FILM COATED ORAL DAILY
Qty: 90 TABLET | Refills: 1 | Status: SHIPPED | OUTPATIENT
Start: 2021-03-11 | End: 2022-05-06

## 2021-03-16 ENCOUNTER — HOSPITAL ENCOUNTER (OUTPATIENT)
Dept: PHYSICAL MEDICINE AND REHAB | Facility: CLINIC | Age: 61
Discharge: HOME | End: 2021-03-16
Attending: PHYSICIAN ASSISTANT

## 2021-03-16 DIAGNOSIS — M47.816 LUMBAR FACET ARTHROPATHY: ICD-10-CM

## 2021-03-16 DIAGNOSIS — M48.061 FORAMINAL STENOSIS OF LUMBAR REGION: ICD-10-CM

## 2021-03-16 DIAGNOSIS — M51.369 DEGENERATION OF LUMBAR INTERVERTEBRAL DISC: ICD-10-CM

## 2021-03-16 ASSESSMENT — MIFFLIN-ST. JEOR: SCORE: 1447.68

## 2021-03-17 ENCOUNTER — COMMUNICATION - HEALTHEAST (OUTPATIENT)
Dept: FAMILY MEDICINE | Facility: CLINIC | Age: 61
End: 2021-03-17

## 2021-03-17 ENCOUNTER — OFFICE VISIT - HEALTHEAST (OUTPATIENT)
Dept: FAMILY MEDICINE | Facility: CLINIC | Age: 61
End: 2021-03-17

## 2021-03-17 DIAGNOSIS — M25.50 POLYARTHRALGIA: ICD-10-CM

## 2021-03-17 DIAGNOSIS — D12.6 ADENOMATOUS POLYP OF COLON, UNSPECIFIED PART OF COLON: ICD-10-CM

## 2021-03-17 DIAGNOSIS — N40.0 BENIGN PROSTATIC HYPERPLASIA, UNSPECIFIED WHETHER LOWER URINARY TRACT SYMPTOMS PRESENT: ICD-10-CM

## 2021-03-17 DIAGNOSIS — K59.01 SLOW TRANSIT CONSTIPATION: ICD-10-CM

## 2021-03-17 DIAGNOSIS — K21.9 GASTROESOPHAGEAL REFLUX DISEASE, UNSPECIFIED WHETHER ESOPHAGITIS PRESENT: ICD-10-CM

## 2021-03-17 DIAGNOSIS — K76.0 HEPATIC STEATOSIS: ICD-10-CM

## 2021-03-17 DIAGNOSIS — K59.00 CONSTIPATION, UNSPECIFIED CONSTIPATION TYPE: ICD-10-CM

## 2021-03-17 DIAGNOSIS — M54.50 LOW BACK PAIN, UNSPECIFIED BACK PAIN LATERALITY, UNSPECIFIED CHRONICITY, UNSPECIFIED WHETHER SCIATICA PRESENT: ICD-10-CM

## 2021-03-17 DIAGNOSIS — K64.8 OTHER HEMORRHOIDS: ICD-10-CM

## 2021-03-17 RX ORDER — DOCUSATE SODIUM 100 MG/1
CAPSULE, LIQUID FILLED ORAL
Qty: 60 CAPSULE | Refills: 3 | Status: SHIPPED | OUTPATIENT
Start: 2021-03-17 | End: 2021-07-19

## 2021-03-17 ASSESSMENT — PATIENT HEALTH QUESTIONNAIRE - PHQ9: SUM OF ALL RESPONSES TO PHQ QUESTIONS 1-9: 0

## 2021-03-18 ENCOUNTER — AMBULATORY - HEALTHEAST (OUTPATIENT)
Dept: NURSING | Facility: CLINIC | Age: 61
End: 2021-03-18

## 2021-03-25 ENCOUNTER — OFFICE VISIT - HEALTHEAST (OUTPATIENT)
Dept: PHYSICAL THERAPY | Facility: CLINIC | Age: 61
End: 2021-03-25

## 2021-03-25 DIAGNOSIS — M54.50 ACUTE LEFT-SIDED LOW BACK PAIN WITHOUT SCIATICA: ICD-10-CM

## 2021-03-25 DIAGNOSIS — M79.18 MYOFASCIAL PAIN: ICD-10-CM

## 2021-03-30 ENCOUNTER — HOSPITAL ENCOUNTER (OUTPATIENT)
Dept: PHYSICAL MEDICINE AND REHAB | Facility: CLINIC | Age: 61
Discharge: HOME | End: 2021-03-30
Attending: PHYSICIAN ASSISTANT

## 2021-03-30 DIAGNOSIS — M47.816 LUMBAR FACET ARTHROPATHY: ICD-10-CM

## 2021-03-30 DIAGNOSIS — M51.369 DEGENERATION OF LUMBAR INTERVERTEBRAL DISC: ICD-10-CM

## 2021-03-30 ASSESSMENT — MIFFLIN-ST. JEOR: SCORE: 1447.68

## 2021-04-05 ENCOUNTER — OFFICE VISIT - HEALTHEAST (OUTPATIENT)
Dept: PHYSICAL THERAPY | Facility: CLINIC | Age: 61
End: 2021-04-05

## 2021-04-05 DIAGNOSIS — M79.18 MYOFASCIAL PAIN: ICD-10-CM

## 2021-04-05 DIAGNOSIS — M54.50 ACUTE LEFT-SIDED LOW BACK PAIN WITHOUT SCIATICA: ICD-10-CM

## 2021-04-07 ENCOUNTER — OFFICE VISIT - HEALTHEAST (OUTPATIENT)
Dept: PHYSICAL THERAPY | Facility: CLINIC | Age: 61
End: 2021-04-07

## 2021-04-07 DIAGNOSIS — M79.18 MYOFASCIAL PAIN: ICD-10-CM

## 2021-04-07 DIAGNOSIS — M54.50 ACUTE LEFT-SIDED LOW BACK PAIN WITHOUT SCIATICA: ICD-10-CM

## 2021-04-08 ENCOUNTER — AMBULATORY - HEALTHEAST (OUTPATIENT)
Dept: NURSING | Facility: CLINIC | Age: 61
End: 2021-04-08

## 2021-04-12 ENCOUNTER — OFFICE VISIT - HEALTHEAST (OUTPATIENT)
Dept: PHYSICAL THERAPY | Facility: CLINIC | Age: 61
End: 2021-04-12

## 2021-04-12 DIAGNOSIS — M79.18 MYOFASCIAL PAIN: ICD-10-CM

## 2021-04-12 DIAGNOSIS — M54.50 ACUTE LEFT-SIDED LOW BACK PAIN WITHOUT SCIATICA: ICD-10-CM

## 2021-04-14 ENCOUNTER — OFFICE VISIT - HEALTHEAST (OUTPATIENT)
Dept: FAMILY MEDICINE | Facility: CLINIC | Age: 61
End: 2021-04-14

## 2021-04-14 ENCOUNTER — OFFICE VISIT - HEALTHEAST (OUTPATIENT)
Dept: PHYSICAL THERAPY | Facility: CLINIC | Age: 61
End: 2021-04-14

## 2021-04-14 DIAGNOSIS — K59.01 SLOW TRANSIT CONSTIPATION: ICD-10-CM

## 2021-04-14 DIAGNOSIS — K21.9 GASTROESOPHAGEAL REFLUX DISEASE, UNSPECIFIED WHETHER ESOPHAGITIS PRESENT: ICD-10-CM

## 2021-04-14 DIAGNOSIS — M54.50 LOW BACK PAIN, UNSPECIFIED BACK PAIN LATERALITY, UNSPECIFIED CHRONICITY, UNSPECIFIED WHETHER SCIATICA PRESENT: ICD-10-CM

## 2021-04-14 DIAGNOSIS — M25.50 POLYARTHRALGIA: ICD-10-CM

## 2021-04-14 DIAGNOSIS — M54.50 ACUTE LEFT-SIDED LOW BACK PAIN WITHOUT SCIATICA: ICD-10-CM

## 2021-04-14 DIAGNOSIS — D12.6 ADENOMATOUS POLYP OF COLON, UNSPECIFIED PART OF COLON: ICD-10-CM

## 2021-04-14 DIAGNOSIS — K76.0 HEPATIC STEATOSIS: ICD-10-CM

## 2021-04-14 DIAGNOSIS — R10.30 LOWER ABDOMINAL PAIN: ICD-10-CM

## 2021-04-14 DIAGNOSIS — E55.9 VITAMIN D DEFICIENCY: ICD-10-CM

## 2021-04-14 DIAGNOSIS — M79.18 MYOFASCIAL PAIN: ICD-10-CM

## 2021-04-14 RX ORDER — SUCRALFATE 1 G/1
TABLET ORAL
Qty: 120 TABLET | Refills: 3 | Status: SHIPPED | OUTPATIENT
Start: 2021-04-14 | End: 2021-07-19

## 2021-04-14 RX ORDER — POLYETHYLENE GLYCOL 3350 17 G/17G
17 POWDER, FOR SOLUTION ORAL 2 TIMES DAILY
Qty: 765 G | Refills: 5 | Status: SHIPPED | OUTPATIENT
Start: 2021-04-14 | End: 2021-07-19

## 2021-04-21 ENCOUNTER — OFFICE VISIT - HEALTHEAST (OUTPATIENT)
Dept: PHYSICAL THERAPY | Facility: CLINIC | Age: 61
End: 2021-04-21

## 2021-04-21 ENCOUNTER — COMMUNICATION - HEALTHEAST (OUTPATIENT)
Dept: FAMILY MEDICINE | Facility: CLINIC | Age: 61
End: 2021-04-21

## 2021-04-21 DIAGNOSIS — M54.50 ACUTE LEFT-SIDED LOW BACK PAIN WITHOUT SCIATICA: ICD-10-CM

## 2021-04-21 DIAGNOSIS — M79.18 MYOFASCIAL PAIN: ICD-10-CM

## 2021-04-21 DIAGNOSIS — R10.30 LOWER ABDOMINAL PAIN: ICD-10-CM

## 2021-04-21 RX ORDER — ACETAMINOPHEN 500 MG
1000 TABLET ORAL EVERY 6 HOURS PRN
Qty: 100 TABLET | Refills: 1 | Status: SHIPPED | OUTPATIENT
Start: 2021-04-21 | End: 2021-07-19

## 2021-04-23 ENCOUNTER — OFFICE VISIT - HEALTHEAST (OUTPATIENT)
Dept: PHYSICAL THERAPY | Facility: CLINIC | Age: 61
End: 2021-04-23

## 2021-04-23 DIAGNOSIS — M79.18 MYOFASCIAL PAIN: ICD-10-CM

## 2021-04-23 DIAGNOSIS — M54.50 ACUTE LEFT-SIDED LOW BACK PAIN WITHOUT SCIATICA: ICD-10-CM

## 2021-04-26 ENCOUNTER — HOSPITAL ENCOUNTER (OUTPATIENT)
Dept: PHYSICAL MEDICINE AND REHAB | Facility: CLINIC | Age: 61
Discharge: HOME | End: 2021-04-26
Attending: PHYSICIAN ASSISTANT

## 2021-04-26 DIAGNOSIS — M51.369 DEGENERATION OF LUMBAR INTERVERTEBRAL DISC: ICD-10-CM

## 2021-04-26 DIAGNOSIS — M48.061 FORAMINAL STENOSIS OF LUMBAR REGION: ICD-10-CM

## 2021-04-26 DIAGNOSIS — M47.816 LUMBAR FACET ARTHROPATHY: ICD-10-CM

## 2021-04-26 RX ORDER — LIDOCAINE 50 MG/G
OINTMENT TOPICAL
Qty: 35.44 G | Refills: 1 | Status: SHIPPED | OUTPATIENT
Start: 2021-04-26 | End: 2022-05-06

## 2021-04-26 ASSESSMENT — MIFFLIN-ST. JEOR: SCORE: 1447.68

## 2021-04-29 ENCOUNTER — OFFICE VISIT - HEALTHEAST (OUTPATIENT)
Dept: PHYSICAL THERAPY | Facility: CLINIC | Age: 61
End: 2021-04-29

## 2021-04-29 DIAGNOSIS — M54.50 ACUTE LEFT-SIDED LOW BACK PAIN WITHOUT SCIATICA: ICD-10-CM

## 2021-04-29 DIAGNOSIS — M79.18 MYOFASCIAL PAIN: ICD-10-CM

## 2021-05-03 ENCOUNTER — OFFICE VISIT - HEALTHEAST (OUTPATIENT)
Dept: FAMILY MEDICINE | Facility: CLINIC | Age: 61
End: 2021-05-03

## 2021-05-03 DIAGNOSIS — Z00.00 ROUTINE HISTORY AND PHYSICAL EXAMINATION OF ADULT: ICD-10-CM

## 2021-05-03 DIAGNOSIS — E55.9 VITAMIN D DEFICIENCY: ICD-10-CM

## 2021-05-03 DIAGNOSIS — K21.9 GASTROESOPHAGEAL REFLUX DISEASE, UNSPECIFIED WHETHER ESOPHAGITIS PRESENT: ICD-10-CM

## 2021-05-03 DIAGNOSIS — D12.6 ADENOMATOUS POLYP OF COLON, UNSPECIFIED PART OF COLON: ICD-10-CM

## 2021-05-03 DIAGNOSIS — K59.00 CONSTIPATION, UNSPECIFIED CONSTIPATION TYPE: ICD-10-CM

## 2021-05-03 DIAGNOSIS — N40.0 BENIGN PROSTATIC HYPERPLASIA, UNSPECIFIED WHETHER LOWER URINARY TRACT SYMPTOMS PRESENT: ICD-10-CM

## 2021-05-03 DIAGNOSIS — R10.30 LOWER ABDOMINAL PAIN: ICD-10-CM

## 2021-05-03 DIAGNOSIS — M25.50 POLYARTHRALGIA: ICD-10-CM

## 2021-05-03 LAB
ALBUMIN SERPL-MCNC: 4.5 G/DL (ref 3.5–5)
ALP SERPL-CCNC: 77 U/L (ref 45–120)
ALT SERPL W P-5'-P-CCNC: 36 U/L (ref 0–45)
AST SERPL W P-5'-P-CCNC: 25 U/L (ref 0–40)
BILIRUB DIRECT SERPL-MCNC: 0.1 MG/DL
BILIRUB SERPL-MCNC: 0.5 MG/DL (ref 0–1)
CHOLEST SERPL-MCNC: 218 MG/DL
FASTING STATUS PATIENT QL REPORTED: YES
HDLC SERPL-MCNC: 35 MG/DL
LDLC SERPL CALC-MCNC: 116 MG/DL
PROT SERPL-MCNC: 8 G/DL (ref 6–8)
PSA SERPL-MCNC: 1.8 NG/ML (ref 0–3.5)
TRIGL SERPL-MCNC: 337 MG/DL

## 2021-05-03 RX ORDER — IBUPROFEN 600 MG/1
TABLET, FILM COATED ORAL
Qty: 60 TABLET | Refills: 1 | Status: SHIPPED | OUTPATIENT
Start: 2021-05-03 | End: 2021-07-19

## 2021-05-06 ENCOUNTER — OFFICE VISIT - HEALTHEAST (OUTPATIENT)
Dept: PHYSICAL THERAPY | Facility: CLINIC | Age: 61
End: 2021-05-06

## 2021-05-06 DIAGNOSIS — M54.50 ACUTE LEFT-SIDED LOW BACK PAIN WITHOUT SCIATICA: ICD-10-CM

## 2021-05-06 DIAGNOSIS — M79.18 MYOFASCIAL PAIN: ICD-10-CM

## 2021-05-13 ENCOUNTER — OFFICE VISIT - HEALTHEAST (OUTPATIENT)
Dept: PHYSICAL THERAPY | Facility: CLINIC | Age: 61
End: 2021-05-13

## 2021-05-13 DIAGNOSIS — M79.18 MYOFASCIAL PAIN: ICD-10-CM

## 2021-05-13 DIAGNOSIS — M54.50 ACUTE LEFT-SIDED LOW BACK PAIN WITHOUT SCIATICA: ICD-10-CM

## 2021-05-20 ENCOUNTER — OFFICE VISIT - HEALTHEAST (OUTPATIENT)
Dept: PHYSICAL THERAPY | Facility: CLINIC | Age: 61
End: 2021-05-20

## 2021-05-20 DIAGNOSIS — M54.50 ACUTE LEFT-SIDED LOW BACK PAIN WITHOUT SCIATICA: ICD-10-CM

## 2021-05-20 DIAGNOSIS — M79.18 MYOFASCIAL PAIN: ICD-10-CM

## 2021-05-24 ENCOUNTER — HOSPITAL ENCOUNTER (OUTPATIENT)
Dept: PHYSICAL MEDICINE AND REHAB | Facility: CLINIC | Age: 61
Discharge: HOME | End: 2021-05-24
Attending: PHYSICIAN ASSISTANT

## 2021-05-24 DIAGNOSIS — M47.816 LUMBAR FACET ARTHROPATHY: ICD-10-CM

## 2021-05-24 DIAGNOSIS — M48.061 FORAMINAL STENOSIS OF LUMBAR REGION: ICD-10-CM

## 2021-05-24 DIAGNOSIS — M51.369 DEGENERATION OF LUMBAR INTERVERTEBRAL DISC: ICD-10-CM

## 2021-05-24 RX ORDER — GABAPENTIN 100 MG/1
100 CAPSULE ORAL 3 TIMES DAILY
Qty: 90 CAPSULE | Refills: 2 | Status: SHIPPED | OUTPATIENT
Start: 2021-05-24 | End: 2021-12-23

## 2021-05-27 ASSESSMENT — PATIENT HEALTH QUESTIONNAIRE - PHQ9: SUM OF ALL RESPONSES TO PHQ QUESTIONS 1-9: 0

## 2021-05-27 NOTE — PROGRESS NOTES
ASSESSMENT AND PLAN:  Nesha Newton 58 y.o. male is a for follow-up of inflammatory arthropathy right shoulder tendinitis, right carpal tunnel syndrome, reports 90% improvement in his symptoms with the current combination of hydroxychloroquine, corticosteroid injection into the right carpal tunnel, done on 1/10/2019.  He may want to consider stopping diclofenac.  The recent data were discussed with him.  Continue hydroxychloroquine.  Eyes were checked 6 months ago.  He could take acetaminophen over-the-counter.  We will meet here in 4 months this time.    Diagnoses and all orders for this visit:    Inflammatory polyarthropathy of hand (H)  -     hydroxychloroquine (PLAQUENIL) 200 mg tablet  Dispense: 180 tablet; Refill: 0    Right shoulder tendinitis    Polyarthralgia  -     hydroxychloroquine (PLAQUENIL) 200 mg tablet  Dispense: 180 tablet; Refill: 0    Right carpal tunnel syndrome      HISTORY OF PRESENTING ILLNESS:  Nesha Newton, 58 y.o., male is here for follow up of inflammatory polyarthritis, shoulder tendinopathy, right side, right carpal tunnel syndrome.  He feels significantly better.  He rated 90% improvement.  He has noted right wrist pain first thing in the morning for about 5 minutes.  This is associated with stiffness overall pain level noted to be 2.0/10.  He has had no fever weight loss blurry vision eye redness mouth ulcer nausea cough or rash.  He has had eyes examined this past fall 2018.  He is no longer getting the nocturnal symptoms in the right hand..  He is able to do most of his day-to-day activities without any or with some difficulty.  He had worsening of pain in the knees but he is known to have osteoarthritis.  He went to the orthopedics and had corticosteroid injections with good effect.  He noted no fever weight loss blurry vision eye redness mouth also nausea cough.  He is taking hydroxychloroquine.  He has had injection into his knees done in December elsewhere.  In the past  for inflammatory arthropathy diclofenac did not help.  We discussed the possibility that it might help him with the osteoarthritis symptoms as well as the left shoulder pain.  He would like to try this.  Major side effects including GI renal and heart were discussed.  That has helped him very significantly.  There is no family history of rheumatoid arthritis, lupus.  Other  historical information and ADL limitations as noted in the multidimensional health assessment questionnaire attached in the EMR.    ALLERGIES:Tramadol    PAST MEDICAL/ACTIVE PROBLEMS/MEDICATION/ FAMILY HISTORY/SOCIAL DATA:  The patient has a family history of  Past Medical History:   Diagnosis Date     GERD (gastroesophageal reflux disease)      Vitamin D deficiency      Social History     Tobacco Use   Smoking Status Never Smoker   Smokeless Tobacco Never Used     Patient Active Problem List   Diagnosis     Coccidioidomycosis     Vitamin D Deficiency     Osteoarthritis Of The Knee     Chronic Eosinophilic Pneumonia     Right upper quadrant abdominal pain     GERD (gastroesophageal reflux disease)     Polyarthralgia     Hepatic steatosis     Inflammatory polyarthropathy of hand (H)     Adenomatous polyp of colon, unspecified part of colon     Primary osteoarthritis of left knee     Primary osteoarthritis of right knee     Chronic gastritis without bleeding, unspecified gastritis type     Right shoulder tendinitis     Right carpal tunnel syndrome     Current Outpatient Medications   Medication Sig Dispense Refill     aspirin 81 MG EC tablet Take 1 tablet (81 mg total) by mouth daily. 150 tablet 2     cholecalciferol, vitamin D3, 2,000 unit Tab 1 po qd 30 tablet 11     diclofenac (VOLTAREN) 75 MG EC tablet TAKE 1 TABLET BY MOUTH AT BEDTIME 90 tablet 0     docusate sodium (COLACE) 100 MG capsule Take 1 capsule (100 mg total) by mouth daily as needed for constipation. 30 capsule 2     hydroxychloroquine (PLAQUENIL) 200 mg tablet TAKE ONE TABLET BY  MOUTH TWICE DAILY 180 tablet 0     pantoprazole (PROTONIX) 40 MG tablet TAKE 1 TABLET BY MOUTH ONCE DAILY 90 tablet 1     polyethylene glycol (GLYCOLAX) 17 gram/dose powder Take 17 g by mouth daily. 510 g 11     ranitidine (ZANTAC) 300 MG tablet Take 1 tablet (300 mg total) by mouth at bedtime. 30 tablet 3     No current facility-administered medications for this visit.      DETAILED EXAMINATION  04/02/19  :  Vitals:    04/02/19 1454   BP: 100/76   Patient Site: Right Arm   Patient Position: Sitting   Cuff Size: Adult Large   Pulse: 68   Weight: 161 lb (73 kg)     Alert oriented. Head including the face is examined for malar rash, heliotropes, scarring, lupus pernio. Eyes examined for redness such as in episcleritis/scleritis, periorbital lesions.   Neck/ Face examined for parotid gland swelling, range of motion of neck.  Left upper and lower and right upper and lower extremities examined for tenderness, swelling, warmth of the appendicular joints, range of motion, edema, rash.  Some of the important findings included: He does not have synovitis in any of the palpable joints of upper extremities, full range of motion of the shoulders.  Knees without effusion warmth or JLT.                   LAB / IMAGING DATA:  ALT   Date Value Ref Range Status   11/12/2018 31 0 - 45 U/L Final   09/06/2018 26 0 - 45 U/L Final   04/24/2017 47 (H) 0 - 45 U/L Final     Albumin   Date Value Ref Range Status   11/12/2018 4.1 3.5 - 5.0 g/dL Final   09/06/2018 4.3 3.5 - 5.0 g/dL Final   04/24/2017 4.0 3.5 - 5.0 g/dL Final     Creatinine   Date Value Ref Range Status   11/12/2018 1.07 0.70 - 1.30 mg/dL Final   09/06/2018 1.13 0.70 - 1.30 mg/dL Final   04/24/2017 1.04 0.70 - 1.30 mg/dL Final       WBC   Date Value Ref Range Status   11/12/2018 6.0 4.0 - 11.0 thou/uL Final   09/06/2018 5.1 4.0 - 11.0 thou/uL Final     Hemoglobin   Date Value Ref Range Status   11/12/2018 14.2 14.0 - 18.0 g/dL Final   09/06/2018 14.8 14.0 - 18.0 g/dL Final    12/14/2017 15.3 14.0 - 18.0 g/dL Final     Platelets   Date Value Ref Range Status   11/12/2018 169 140 - 440 thou/uL Final   09/06/2018 179 140 - 440 thou/uL Final   12/14/2017 167 140 - 440 thou/uL Final       Lab Results   Component Value Date    RF <15.0 02/13/2017    SEDRATE 5 01/11/2017

## 2021-05-27 NOTE — PROGRESS NOTES
Subjective: Patient comes in for evaluation this 58-year-old male was seen at [facility] on 4/4/2019 for some chest pain.  He had a CTA which was negative of the chest normal EKG    Labs with normal CBC normal sed rate C-reactive protein.  Also BMP and urine were normal    He was treated with ibuprofen.    He describes the pain in through the mid chest he states that things have improved.  He does not really complain of reflux although has a history of this    I reviewed his meds and he does have some diclofenac prescription I told him he should not be taking both the ibuprofen and the diclofenac and he understood this.    Tobacco status: He  reports that he has never smoked. He has never used smokeless tobacco.    Patient Active Problem List    Diagnosis Date Noted     Right carpal tunnel syndrome 01/10/2019     Right shoulder tendinitis 06/07/2018     Primary osteoarthritis of left knee 04/03/2018     Primary osteoarthritis of right knee 04/03/2018     Chronic gastritis without bleeding, unspecified gastritis type 04/03/2018     Adenomatous polyp of colon, unspecified part of colon 06/06/2017     Inflammatory polyarthropathy of hand (H) 05/03/2017     Hepatic steatosis 04/24/2017     Polyarthralgia 04/05/2017     Right upper quadrant abdominal pain 12/20/2016     GERD (gastroesophageal reflux disease)      Coccidioidomycosis      Vitamin D Deficiency      Osteoarthritis Of The Knee      Chronic Eosinophilic Pneumonia        Current Outpatient Medications   Medication Sig Dispense Refill     aspirin 81 MG EC tablet Take 1 tablet (81 mg total) by mouth daily. 150 tablet 2     cholecalciferol, vitamin D3, 2,000 unit Tab 1 po qd 30 tablet 11     diclofenac (VOLTAREN) 75 MG EC tablet TAKE 1 TABLET BY MOUTH AT BEDTIME 90 tablet 0     docusate sodium (COLACE) 100 MG capsule Take 1 capsule (100 mg total) by mouth daily as needed for constipation. 30 capsule 2     hydroxychloroquine (PLAQUENIL) 200 mg tablet TAKE  ONE TABLET BY MOUTH TWICE DAILY 180 tablet 0     ibuprofen (ADVIL,MOTRIN) 600 MG tablet Take 1 tablet (600 mg total) by mouth every 6 (six) hours as needed for pain. 30 tablet 0     pantoprazole (PROTONIX) 40 MG tablet TAKE 1 TABLET BY MOUTH ONCE DAILY 90 tablet 1     polyethylene glycol (GLYCOLAX) 17 gram/dose powder Take 17 g by mouth daily. 510 g 11     ranitidine (ZANTAC) 300 MG tablet Take 1 tablet (300 mg total) by mouth at bedtime. 30 tablet 3     No current facility-administered medications for this visit.        ROS:   10 point review of systems positive as outlined otherwise negative    Objective:    /70 (Patient Site: Right Arm, Patient Position: Sitting, Cuff Size: Adult Regular)   Pulse 80   Resp 16   Wt 161 lb (73 kg)   BMI 25.22 kg/m    Body mass index is 25.22 kg/m .      General appearance no acute distress  Vital signs are stable    Lungs are clear no rales or rhonchi, heart was regular S1-S2 no murmur no rub.    HEENT neck was negative oropharynx is clear pupils react normally  No reproducible on palpation of the chest.  No rashes.    Abdomen is soft nontender no guarding or rebound, extremities without edema.    Lab work from 4/4/2019 reviewed below and above.        Results for orders placed or performed during the hospital encounter of 04/04/19   Basic metabolic panel   Result Value Ref Range    Sodium 140 136 - 145 mmol/L    Potassium 3.7 3.5 - 5.0 mmol/L    Chloride 104 98 - 107 mmol/L    CO2 27 22 - 31 mmol/L    Anion Gap, Calculation 9 5 - 18 mmol/L    Glucose 92 70 - 125 mg/dL    Calcium 9.6 8.5 - 10.5 mg/dL    BUN 12 8 - 22 mg/dL    Creatinine 1.13 0.70 - 1.30 mg/dL    GFR MDRD Af Amer >60 >60 mL/min/1.73m2    GFR MDRD Non Af Amer >60 >60 mL/min/1.73m2   CBC   Result Value Ref Range    WBC 6.1 4.0 - 11.0 thou/uL    RBC 5.18 4.40 - 6.20 mill/uL    Hemoglobin 15.0 14.0 - 18.0 g/dL    Hematocrit 43.9 40.0 - 54.0 %    MCV 85 80 - 100 fL    MCH 29.0 27.0 - 34.0 pg    MCHC 34.2 32.0 -  36.0 g/dL    RDW 13.3 11.0 - 14.5 %    Platelets 162 140 - 440 thou/uL    MPV 13.2 (H) 8.5 - 12.5 fL   Urinalysis-UC if Indicated   Result Value Ref Range    Color, UA Colorless Colorless, Yellow, Straw, Light Yellow    Clarity, UA Clear Clear    Glucose, UA Negative Negative    Bilirubin, UA Negative Negative    Ketones, UA Negative Negative, 60 mg/dL    Specific Gravity, UA 1.003 1.001 - 1.030    Blood, UA Negative Negative    pH, UA 5.5 4.5 - 8.0    Protein, UA Negative Negative mg/dL    Urobilinogen, UA <2.0 E.U./dL <2.0 E.U./dL, 2.0 E.U./dL    Nitrite, UA Negative Negative    Leukocytes, UA Negative Negative   Troponin I   Result Value Ref Range    Troponin I <0.01 0.00 - 0.29 ng/mL   Erythrocyte Sedimentation Rate   Result Value Ref Range    Sed Rate 2 0 - 15 mm/hr   C-Reactive Protein   Result Value Ref Range    CRP <0.1 0.0 - 0.8 mg/dL   ECG 12 lead nursing unit performed   Result Value Ref Range    SYSTOLIC BLOOD PRESSURE  mmHg    DIASTOLIC BLOOD PRESSURE  mmHg    VENTRICULAR RATE 68 BPM    ATRIAL RATE 68 BPM    P-R INTERVAL 154 ms    QRS DURATION 92 ms    Q-T INTERVAL 374 ms    QTC CALCULATION (BEZET) 397 ms    P Axis 41 degrees    R AXIS 52 degrees    T AXIS 35 degrees    MUSE DIAGNOSIS       Normal sinus rhythm  Normal ECG  When compared with ECG of 21-DEC-2016 01:51,  No significant change was found  Confirmed by CHERELLE ZEPEDA MD LOC:[facility] ([id number]) on 4/4/2019 3:03:43 PM         Assessment:  1. Chest wall pain     2. Polyarthralgia       Chest wall pain resolving    History of polyarthralgia.  Does take Plaquenil.    Avoid duplication of NSAIDs, follow-up as needed otherwise in 3-6 months    Plan: As outlined above continue the pantoprazole    This transcription uses voice recognition software, which may contain typographical errors.

## 2021-05-27 NOTE — TELEPHONE ENCOUNTER
RN cannot approve Refill Request    RN can NOT refill this medication med is not covered by policy/route to provider     . Last office visit: 4/8/2019 Max Mart MD Last Physical: 11/12/2018 Last MTM visit: Visit date not found Last visit same specialty: 4/8/2019 Max Mart MD.  Next visit within 3 mo: Visit date not found  Next physical within 3 mo: Visit date not found      Angelica Montes, [facility] Triage/Med Refill 4/12/2019    Requested Prescriptions   Pending Prescriptions Disp Refills     VITAMIN D3 2,000 unit capsule [Pharmacy Med Name: D3 2000UNIT CAP] 30 capsule 11     Sig: TAKE 1 CAPSULE BY MOUTH ONCE DAILY       There is no refill protocol information for this order

## 2021-05-27 NOTE — TELEPHONE ENCOUNTER
RN cannot approve Refill Request    RN can NOT refill this medication med is not covered by policy/route to provider.    Juanpablo Bethea, [facility] Triage/Med Refill 4/2/2019    Requested Prescriptions   Pending Prescriptions Disp Refills     diclofenac (VOLTAREN) 75 MG EC tablet [Pharmacy Med Name: DICLOFENAC 75MG DR  TAB] 90 tablet 0     Sig: TAKE 1 TABLET BY MOUTH AT BEDTIME    There is no refill protocol information for this order

## 2021-05-27 NOTE — TELEPHONE ENCOUNTER
Call from pt       CC:  Chest pain spells     > Chest pains since yesterday morning     > Intermittent   > Has had 3 - 4 spells so far - each one lasts about an hour   > Pain down his R arm as well        A/P:   > Directed to ED now   > Yes - does have ride  - daughter is with him      Yrn Germain, RN   Triage and Medication Refills        Reason for Disposition    Pain also present in shoulder(s) or arm(s) or jaw    Protocols used: CHEST PAIN-A-OH

## 2021-05-28 NOTE — TELEPHONE ENCOUNTER
Use  line to contact :Dona ID:[id number]  Called and spoke with pt , Message was given, pt understood, no further questions.

## 2021-05-28 NOTE — TELEPHONE ENCOUNTER
Called pt through [place] ([place] [id number]). Relayed provider's message to him; stated understanding and 6 week follow up appt was made.    Additional question/concern: pt stated he went to the pharmacy to get the pantoprazole; however, he was told his insurance does not cover it. He also stated he is currently unemployed, so is unable to get medication.      Please advise, thanks.

## 2021-05-28 NOTE — PROGRESS NOTES
Subjective: This patient comes in for evaluation.  He was at [facility] at the emergency room on 5/5/2019.    Patient had symptoms of gastritis had a GI cocktail that did resolve the symptoms.    Patient does get some ongoing epigastric pain.    Labs showed lipase at 57 which was actually down from previous patient did have a CT scan in 2016 of the abdomen and pelvis which was unremarkable.    Patient additionally had BMP and LFTs normal CBC normal troponin normal please see below    Patient had a colonoscopy in June 2017 which showed adenomatous polyps redo colonoscopy due again 6/2022.    Patient had been on omeprazole in the past to get a prescription for this but does not feel that it is going to help wanted to try something different.  I did give him some pantoprazole.    Also discussed getting H. pylori stool antigen he was in agreement with that    If symptoms persist despite pantoprazole and H. pylori is negative we will go on and have him see [facility] for EGD.    Tobacco status: He  reports that he has never smoked. He has never used smokeless tobacco.    Patient Active Problem List    Diagnosis Date Noted     Right carpal tunnel syndrome 01/10/2019     Right shoulder tendinitis 06/07/2018     Primary osteoarthritis of left knee 04/03/2018     Primary osteoarthritis of right knee 04/03/2018     Chronic gastritis without bleeding, unspecified gastritis type 04/03/2018     Adenomatous polyp of colon, unspecified part of colon 06/06/2017     Inflammatory polyarthropathy of hand (H) 05/03/2017     Hepatic steatosis 04/24/2017     Polyarthralgia 04/05/2017     Right upper quadrant abdominal pain 12/20/2016     GERD (gastroesophageal reflux disease)      Coccidioidomycosis      Vitamin D Deficiency      Osteoarthritis Of The Knee      Chronic Eosinophilic Pneumonia        Current Outpatient Medications   Medication Sig Dispense Refill     aspirin 81 MG EC tablet Take 1 tablet (81 mg total) by mouth  daily. 150 tablet 2     cholecalciferol, vitamin D3, 2,000 unit Tab 1 po qd 30 tablet 11     diclofenac (VOLTAREN) 75 MG EC tablet TAKE 1 TABLET BY MOUTH AT BEDTIME 90 tablet 0     docusate sodium (COLACE) 100 MG capsule Take 1 capsule (100 mg total) by mouth daily as needed for constipation. 30 capsule 2     hydroxychloroquine (PLAQUENIL) 200 mg tablet TAKE ONE TABLET BY MOUTH TWICE DAILY 180 tablet 0     pantoprazole (PROTONIX) 40 MG tablet Take 1 tablet (40 mg total) by mouth daily. 30 tablet 1     polyethylene glycol (GLYCOLAX) 17 gram/dose powder Take 17 g by mouth daily. 510 g 11     VITAMIN D3 2,000 unit capsule TAKE 1 CAPSULE BY MOUTH ONCE DAILY 30 capsule 11     No current facility-administered medications for this visit.        ROS:   10 point review of systems positive as outlined above, otherwise negative    Objective:    /80 (Patient Site: Left Arm, Patient Position: Sitting, Cuff Size: Adult Regular)   Pulse 76   Temp 97.5  F (36.4  C) (Oral)   Wt 161 lb (73 kg)   BMI 25.22 kg/m    Body mass index is 25.22 kg/m .    General appearance no acute distress    HEENT neck is negative no adenopathy oropharynx is clear pupils react normally    No scleral icterus, no jaundice change to the skin    Abdomen was soft bowel sounds normal he does not have any guarding or rebound but describes pain in through the epigastric area    No organomegaly.    Skin as discussed no rashes    Extremities without edema    Lungs are clear no rales or rhonchi heart was regular rate in the 70s.    Labs noted as outlined below    Of note he did have a higher lipase back in December 2016 at 71.    I reviewed the report from the emergency room on 5/5/2019 at [facility]    Also reviewed previous colonoscopy and June 2017 and reviewed the CT scan of the abdomen and pelvis in 2016.    Results for orders placed or performed during the hospital encounter of 05/05/19   Troponin I   Result Value Ref Range    Troponin I 0.01 0.00 -  0.29 ng/mL   Magnesium   Result Value Ref Range    Magnesium 2.2 1.8 - 2.6 mg/dL   Lipase   Result Value Ref Range    Lipase 57 (H) 0 - 52 U/L   Hepatic Profile   Result Value Ref Range    Bilirubin, Total 0.4 0.0 - 1.0 mg/dL    Bilirubin, Direct 0.1 <=0.5 mg/dL    Protein, Total 7.4 6.0 - 8.0 g/dL    Albumin 4.3 3.5 - 5.0 g/dL    Alkaline Phosphatase 69 45 - 120 U/L    AST 20 0 - 40 U/L    ALT 26 0 - 45 U/L   Basic Metabolic Panel   Result Value Ref Range    Sodium 138 136 - 145 mmol/L    Potassium 4.1 3.5 - 5.0 mmol/L    Chloride 106 98 - 107 mmol/L    CO2 25 22 - 31 mmol/L    Anion Gap, Calculation 7 5 - 18 mmol/L    Glucose 109 70 - 125 mg/dL    Calcium 9.2 8.5 - 10.5 mg/dL    BUN 12 8 - 22 mg/dL    Creatinine 1.10 0.70 - 1.30 mg/dL    GFR MDRD Af Amer >60 >60 mL/min/1.73m2    GFR MDRD Non Af Amer >60 >60 mL/min/1.73m2   HM2(CBC w/o Differential)   Result Value Ref Range    WBC 4.9 4.0 - 11.0 thou/uL    RBC 4.89 4.40 - 6.20 mill/uL    Hemoglobin 14.2 14.0 - 18.0 g/dL    Hematocrit 41.3 40.0 - 54.0 %    MCV 85 80 - 100 fL    MCH 29.0 27.0 - 34.0 pg    MCHC 34.4 32.0 - 36.0 g/dL    RDW 13.3 11.0 - 14.5 %    Platelets 165 140 - 440 thou/uL    MPV 13.2 (H) 8.5 - 12.5 fL   ECG 12 lead nursing unit performed   Result Value Ref Range    SYSTOLIC BLOOD PRESSURE  mmHg    DIASTOLIC BLOOD PRESSURE  mmHg    VENTRICULAR RATE 71 BPM    ATRIAL RATE 71 BPM    P-R INTERVAL 182 ms    QRS DURATION 90 ms    Q-T INTERVAL 360 ms    QTC CALCULATION (BEZET) 391 ms    P Axis 38 degrees    R AXIS 22 degrees    T AXIS 29 degrees    MUSE DIAGNOSIS       Normal sinus rhythm  Normal ECG  When compared with ECG of 04-APR-2019 08:24,  No significant change was found  Confirmed by REISDSAMIRA RESENDEZ MD LOC:[facility] ([id number]) on 5/5/2019 2:30:08 PM         Assessment:  1. Chronic gastritis without bleeding, unspecified gastritis type  pantoprazole (PROTONIX) 40 MG tablet    H. pylori Antigen, Stool(HPSAG)    H. pylori Antigen, Stool(HPSAG)   2.  Adenomatous polyp of colon, unspecified part of colon       Chronic gastritis symptoms we will try pantoprazole, avoid caffeine alcohol smoking citrus.  And spicy foods    Check stool antigen for H. pylori.  Consider EGD    Colonoscopy due again June 2022    Slight elevated lipase        Plan: As outlined above    This transcription uses voice recognition software, which may contain typographical errors.

## 2021-05-28 NOTE — TELEPHONE ENCOUNTER
----- Message from Max Mart MD sent at 5/12/2019  9:48 AM CDT -----  Please contact this patient, let him know that the H. pylori stool antigen was negative, no sign of infection    I gave him some new medication, pantoprazole.  Try this for the stomach.  If ongoing problems he should follow-up in the next few weeks, otherwise recheck in 6 weeks

## 2021-05-29 NOTE — PROGRESS NOTES
Subjective: Patient comes in for evaluation this 58-year-old male has history of constipation.  He needed a refill on his polyethylene glycol but is not sure what is covered    I did send in a refill on that but also gave him a prescription, printed, for sugar-free Citrucel he can bring that and if the polyethylene glycol is not covered.    I did discuss that these are over-the-counter preparation so they might not be covered.    Patient also has reflux history and is been stable on that he continues on famotidine.    Also has used some docusate for constipation.    Tobacco status: He  reports that he has never smoked. He has never used smokeless tobacco.    Patient Active Problem List    Diagnosis Date Noted     Right carpal tunnel syndrome 01/10/2019     Right shoulder tendinitis 06/07/2018     Primary osteoarthritis of left knee 04/03/2018     Primary osteoarthritis of right knee 04/03/2018     Chronic gastritis without bleeding, unspecified gastritis type 04/03/2018     Adenomatous polyp of colon, unspecified part of colon 06/06/2017     Inflammatory polyarthropathy of hand (H) 05/03/2017     Hepatic steatosis 04/24/2017     Polyarthralgia 04/05/2017     Right upper quadrant abdominal pain 12/20/2016     GERD (gastroesophageal reflux disease)      Coccidioidomycosis      Vitamin D Deficiency      Osteoarthritis Of The Knee      Chronic Eosinophilic Pneumonia        Current Outpatient Medications   Medication Sig Dispense Refill     aspirin 81 MG EC tablet Take 1 tablet (81 mg total) by mouth daily. 150 tablet 2     cholecalciferol, vitamin D3, 2,000 unit Tab 1 po qd 30 tablet 11     diclofenac (VOLTAREN) 75 MG EC tablet TAKE 1 TABLET BY MOUTH AT BEDTIME 90 tablet 0     docusate sodium (COLACE) 100 MG capsule Take 1 capsule (100 mg total) by mouth daily as needed for constipation. 30 capsule 2     famotidine (PEPCID) 40 MG tablet Take 1 tablet (40 mg total) by mouth every evening. 30 tablet 6      hydroxychloroquine (PLAQUENIL) 200 mg tablet TAKE ONE TABLET BY MOUTH TWICE DAILY 180 tablet 0     pantoprazole (PROTONIX) 40 MG tablet Take 1 tablet (40 mg total) by mouth daily. 30 tablet 1     polyethylene glycol (GLYCOLAX) 17 gram/dose powder Take 17 g by mouth daily. 510 g 11     VITAMIN D3 2,000 unit capsule TAKE 1 CAPSULE BY MOUTH ONCE DAILY 30 capsule 11     methylcellulose (CITRUCEL SUGAR FREE) Powd Take 6 g by mouth daily. 540 g 3     No current facility-administered medications for this visit.        ROS: 10 point review of systems positive as outlined above otherwise negative    Objective:    /76 (Patient Site: Right Arm, Patient Position: Sitting, Cuff Size: Adult Regular)   Pulse 64   Resp 16   Wt 158 lb 4 oz (71.8 kg)   BMI 24.79 kg/m    Body mass index is 24.79 kg/m .    General appearance no acute distress    Neck was negative oropharynx is clear    Lungs are clear no rales or rhonchi heart was regular rate and 60s    Abdomen is soft nontender no guarding or rebound back without CVA pain.        Assessment:  1. Gastroesophageal reflux disease, esophagitis presence not specified     2. Constipation, unspecified constipation type  polyethylene glycol (GLYCOLAX) 17 gram/dose powder    methylcellulose (CITRUCEL SUGAR FREE) Powd     Reflux stable on famotidine    Constipation I did discuss increasing fruits and vegetables and increasing water intake up to 8 to 10 glasses a day    Please see above discussion regarding the polyethylene glycol as well as the Citrucel    Plan: As discussed above    This transcription uses voice recognition software, which may contain typographical errors.

## 2021-05-30 VITALS — BODY MASS INDEX: 28.17 KG/M2 | HEIGHT: 63 IN | WEIGHT: 159 LBS

## 2021-05-30 VITALS — WEIGHT: 159.9 LBS | BODY MASS INDEX: 28.33 KG/M2

## 2021-05-30 VITALS — WEIGHT: 157 LBS | BODY MASS INDEX: 27.81 KG/M2

## 2021-05-30 VITALS — WEIGHT: 154 LBS | BODY MASS INDEX: 27.28 KG/M2

## 2021-05-30 VITALS — BODY MASS INDEX: 27.91 KG/M2 | WEIGHT: 157.5 LBS | HEIGHT: 63 IN

## 2021-05-30 VITALS — BODY MASS INDEX: 27.63 KG/M2 | WEIGHT: 156 LBS

## 2021-05-30 VITALS — BODY MASS INDEX: 28.34 KG/M2 | WEIGHT: 160 LBS

## 2021-05-30 VITALS — WEIGHT: 159.3 LBS | BODY MASS INDEX: 28.22 KG/M2

## 2021-05-30 VITALS — BODY MASS INDEX: 27.43 KG/M2 | HEIGHT: 63 IN | WEIGHT: 154.8 LBS

## 2021-05-30 NOTE — PROGRESS NOTES
Subjective: Patient comes in for a rash on the neck he said for couple weeks on the left side of the neck it itches    Drainage no fever    He was in the emergency room yesterday at [facility] for some left upper quadrant abdominal pain    Evaluation with labs normal CBC normal CMP lipase is 68 that is been in that range multiple times when he is had checks.    He is on Protonix for GERD.  CT scan of the abdomen showed no abnormalities.  Pancreas is normal.  Liver normal    They added Carafate to Protonix 1 g 4 times daily for 7 days he does feel better today no additional concerns or issues.    Tobacco status: He  reports that he has never smoked. He has never used smokeless tobacco.    Patient Active Problem List    Diagnosis Date Noted     Right carpal tunnel syndrome 01/10/2019     Right shoulder tendinitis 06/07/2018     Primary osteoarthritis of left knee 04/03/2018     Primary osteoarthritis of right knee 04/03/2018     Chronic gastritis without bleeding, unspecified gastritis type 04/03/2018     Adenomatous polyp of colon, unspecified part of colon 06/06/2017     Inflammatory polyarthropathy of hand (H) 05/03/2017     Hepatic steatosis 04/24/2017     Polyarthralgia 04/05/2017     Right upper quadrant abdominal pain 12/20/2016     GERD (gastroesophageal reflux disease)      Vitamin D Deficiency      Osteoarthritis Of The Knee      Chronic Eosinophilic Pneumonia        Current Outpatient Medications   Medication Sig Dispense Refill     aspirin 81 MG EC tablet Take 1 tablet (81 mg total) by mouth daily. 150 tablet 2     cholecalciferol, vitamin D3, 2,000 unit Tab 1 po qd 30 tablet 11     diclofenac (VOLTAREN) 75 MG EC tablet TAKE 1 TABLET BY MOUTH AT BEDTIME 90 tablet 0     docusate sodium (COLACE) 100 MG capsule Take 1 capsule (100 mg total) by mouth daily as needed for constipation. 30 capsule 2     famotidine (PEPCID) 40 MG tablet Take 1 tablet (40 mg total) by mouth every evening. 30 tablet 6      hydroxychloroquine (PLAQUENIL) 200 mg tablet TAKE ONE TABLET BY MOUTH TWICE DAILY 180 tablet 0     methylcellulose (CITRUCEL SUGAR FREE) Powd Take 6 g by mouth daily. 540 g 3     pantoprazole (PROTONIX) 40 MG tablet Take 1 tablet (40 mg total) by mouth daily. 30 tablet 1     polyethylene glycol (GLYCOLAX) 17 gram/dose powder Take 17 g by mouth daily. 510 g 11     sucralfate (CARAFATE) 1 gram tablet Take 1 tablet (1 g total) by mouth 4 (four) times a day for 7 days. 28 tablet 0     VITAMIN D3 2,000 unit capsule TAKE 1 CAPSULE BY MOUTH ONCE DAILY 30 capsule 11     clotrimazole-betamethasone (LOTRISONE) cream Apply two times a day to neck rash 30 g 0     No current facility-administered medications for this visit.        ROS: 10 point review of systems positive as outlined above otherwise negative    Objective:    /81 (Patient Site: Left Arm, Patient Position: Sitting, Cuff Size: Adult Regular)   Pulse 68   Resp 14   Wt 157 lb (71.2 kg)   SpO2 99%   BMI 26.95 kg/m    Body mass index is 26.95 kg/m .      General appearance no acute distress    Vital signs are stable afebrile O2 sat was normal    HEENT neck has rash on the left lateral aspect of the oval 1/2 x 3 cm scaly raised, pruritic    No other rashes noted    Neck was supple otherwise no adenopathy    Lungs are clear no rales or rhonchi heart was regular rate in the 60-70 range    Abdomen was soft bowel sounds normal no guarding or rebound no epigastric pain    Back without CVA pain.    Lower extremities without edema    CT scan as discussed normal, there was a benign hepatic cyst.    Urine normal, CBC normal, CMP normal, lipase is outlined slightly elevated as noted previously.    EKG was also done yesterday and        Results for orders placed or performed during the hospital encounter of 07/29/19   Urinalysis-UC if Indicated   Result Value Ref Range    Color, UA Yellow Colorless, Yellow, Straw, Light Yellow    Clarity, UA Clear Clear    Glucose, UA  Negative Negative    Bilirubin, UA Negative Negative    Ketones, UA Negative Negative, 60 mg/dL    Specific Gravity, UA 1.022 1.001 - 1.030    Blood, UA Negative Negative    pH, UA 6.0 4.5 - 8.0    Protein, UA Negative Negative mg/dL    Urobilinogen, UA <2.0 E.U./dL <2.0 E.U./dL, 2.0 E.U./dL    Nitrite, UA Negative Negative    Leukocytes, UA Negative Negative   HM2 (CBC W/O DIFF)   Result Value Ref Range    WBC 6.1 4.0 - 11.0 thou/uL    RBC 5.22 4.40 - 6.20 mill/uL    Hemoglobin 15.1 14.0 - 18.0 g/dL    Hematocrit 44.5 40.0 - 54.0 %    MCV 85 80 - 100 fL    MCH 28.9 27.0 - 34.0 pg    MCHC 33.9 32.0 - 36.0 g/dL    RDW 13.2 11.0 - 14.5 %    Platelets 146 140 - 440 thou/uL    MPV 14.2 (H) 8.5 - 12.5 fL   Comprehensive Metabolic Panel   Result Value Ref Range    Sodium 140 136 - 145 mmol/L    Potassium 4.2 3.5 - 5.0 mmol/L    Chloride 104 98 - 107 mmol/L    CO2 27 22 - 31 mmol/L    Anion Gap, Calculation 9 5 - 18 mmol/L    Glucose 112 70 - 125 mg/dL    BUN 14 8 - 22 mg/dL    Creatinine 1.24 0.70 - 1.30 mg/dL    GFR MDRD Af Amer >60 >60 mL/min/1.73m2    GFR MDRD Non Af Amer 60 (L) >60 mL/min/1.73m2    Bilirubin, Total 0.5 0.0 - 1.0 mg/dL    Calcium 9.7 8.5 - 10.5 mg/dL    Protein, Total 8.0 6.0 - 8.0 g/dL    Albumin 4.5 3.5 - 5.0 g/dL    Alkaline Phosphatase 79 45 - 120 U/L    AST 19 0 - 40 U/L    ALT 29 0 - 45 U/L   Lipase   Result Value Ref Range    Lipase 68 (H) 0 - 52 U/L   ECG 12 lead nursing unit performed   Result Value Ref Range    SYSTOLIC BLOOD PRESSURE  mmHg    DIASTOLIC BLOOD PRESSURE  mmHg    VENTRICULAR RATE 65 BPM    ATRIAL RATE 65 BPM    P-R INTERVAL 158 ms    QRS DURATION 92 ms    Q-T INTERVAL 372 ms    QTC CALCULATION (BEZET) 386 ms    P Axis 38 degrees    R AXIS 48 degrees    T AXIS 34 degrees    MUSE DIAGNOSIS       Normal sinus rhythm with sinus arrhythmia  Normal ECG  When compared with ECG of 05-MAY-2019 08:41,  No significant change was found  Confirmed by KENNETH ZUÑIGA, LES LOC:[facility] ([id number]) on  7/29/2019 4:33:17 PM         Assessment:  1. Rash of neck  clotrimazole-betamethasone (LOTRISONE) cream   2. Gastroesophageal reflux disease, esophagitis presence not specified     3. Abdominal pain, unspecified abdominal location       Rash of the neck will use Lotrisone cream twice a day continue on Protonix and use the Carafate for a week.  Plan: As discussed above    He denies any constipation he does have some docusate use on a as needed basis and also some Citrucel sugar-free    This transcription uses voice recognition software, which may contain typographical errors.

## 2021-05-31 VITALS — WEIGHT: 155 LBS | HEIGHT: 67 IN | BODY MASS INDEX: 24.33 KG/M2

## 2021-05-31 VITALS — WEIGHT: 155 LBS | BODY MASS INDEX: 27.46 KG/M2

## 2021-05-31 VITALS — WEIGHT: 161 LBS | BODY MASS INDEX: 25.22 KG/M2

## 2021-05-31 VITALS — BODY MASS INDEX: 25.58 KG/M2 | WEIGHT: 163 LBS | HEIGHT: 67 IN

## 2021-05-31 VITALS — BODY MASS INDEX: 27.46 KG/M2 | WEIGHT: 155 LBS

## 2021-05-31 NOTE — PROGRESS NOTES
ASSESSMENT AND PLAN:  Nesha Newton 58 y.o. male is a for follow-up.  He has undifferentiated inflammatory arthropathy.  He is on hydroxychloroquine, doing so much better.  He noted neck pain.  This has features of cervical spondylosis.  Management principles were reviewed.  Should this not resolve in the next couple of weeks he and he would either follow-up here or at the primary physician's office.  There are no radicular symptoms.  He is aware of the eye examination.  Follow-up here in 3 months.       Diagnoses and all orders for this visit:    Inflammatory polyarthropathy of hand (H)  -     hydroxychloroquine (PLAQUENIL) 200 mg tablet; TAKE ONE TABLET BY MOUTH TWICE DAILY  Dispense: 180 tablet; Refill: 0    Osteoarthritis Of The Knee    Polyarthralgia  -     hydroxychloroquine (PLAQUENIL) 200 mg tablet; TAKE ONE TABLET BY MOUTH TWICE DAILY  Dispense: 180 tablet; Refill: 0      HISTORY OF PRESENTING ILLNESS:  Nesha Newton, 58 y.o., male is here for follow up of inflammatory polyarthritis, shoulder tendinopathy, right side, right carpal tunnel syndrome.  He feels significantly better.  He rated 90% improvement.  He has noted pain.  This is in his neck.  This is on the right side.  Worse with movement.  Sometimes it shoots up to the occipital area.  There is no history of fall.  There is no radiation down the arm.  There is no sphincter control problem.  There is no nocturnal pain.  He is not febrile. .  He has had eyes examined this past fall 2018.  He is no longer getting the nocturnal symptoms in the right hand..  He is able to do most of his day-to-day activities without any or with some difficulty.  He had worsening of pain in the knees but he is known to have osteoarthritis.  He went to the orthopedics and had corticosteroid injections with good effect.  He noted no fever weight loss blurry vision eye redness mouth also nausea cough.  He is taking hydroxychloroquine.  He has had injection into his  knees done in December elsewhere.  In the past for inflammatory arthropathy diclofenac did not help.  We discussed the possibility that it might help him with the osteoarthritis symptoms as well as the left shoulder pain.  He would like to try this.  Major side effects including GI renal and heart were discussed.  That has helped him very significantly.  There is no family history of rheumatoid arthritis, lupus.  Other  historical information and ADL limitations as noted in the multidimensional health assessment questionnaire attached in the EMR.    ALLERGIES:Tramadol    PAST MEDICAL/ACTIVE PROBLEMS/MEDICATION/ FAMILY HISTORY/SOCIAL DATA:  The patient has a family history of  Past Medical History:   Diagnosis Date     Chronic gastritis without bleeding, unspecified gastritis type 4/3/2018     Coccidioidomycosis     Created by Conversion      GERD (gastroesophageal reflux disease)      Hepatic steatosis 4/24/2017    HCV neg,      Osteoarthritis Of The Knee     Created by Conversion  Replacement Utility updated for latest IMO load     Polyarthralgia 4/5/2017     Vitamin D deficiency      Social History     Tobacco Use   Smoking Status Never Smoker   Smokeless Tobacco Never Used     Patient Active Problem List   Diagnosis     Vitamin D Deficiency     Osteoarthritis Of The Knee     Chronic Eosinophilic Pneumonia     Right upper quadrant abdominal pain     GERD (gastroesophageal reflux disease)     Polyarthralgia     Hepatic steatosis     Inflammatory polyarthropathy of hand (H)     Adenomatous polyp of colon, unspecified part of colon     Primary osteoarthritis of left knee     Primary osteoarthritis of right knee     Chronic gastritis without bleeding, unspecified gastritis type     Right shoulder tendinitis     Right carpal tunnel syndrome     Current Outpatient Medications   Medication Sig Dispense Refill     aspirin 81 MG EC tablet Take 1 tablet (81 mg total) by mouth daily. 150 tablet 2     cholecalciferol, vitamin  D3, 2,000 unit Tab 1 po qd 30 tablet 11     clotrimazole-betamethasone (LOTRISONE) cream Apply two times a day to neck rash 30 g 0     diclofenac (VOLTAREN) 75 MG EC tablet TAKE 1 TABLET BY MOUTH AT BEDTIME 90 tablet 0     docusate sodium (COLACE) 100 MG capsule Take 1 capsule (100 mg total) by mouth daily as needed for constipation. 30 capsule 2     famotidine (PEPCID) 40 MG tablet Take 1 tablet (40 mg total) by mouth every evening. 30 tablet 6     hydroxychloroquine (PLAQUENIL) 200 mg tablet TAKE ONE TABLET BY MOUTH TWICE DAILY 180 tablet 0     methylcellulose (CITRUCEL SUGAR FREE) Powd Take 6 g by mouth daily. 540 g 3     pantoprazole (PROTONIX) 40 MG tablet Take 1 tablet (40 mg total) by mouth daily. 30 tablet 1     polyethylene glycol (GLYCOLAX) 17 gram/dose powder Take 17 g by mouth daily. 510 g 11     sucralfate (CARAFATE) 1 gram tablet Take 1 tablet (1 g total) by mouth 4 (four) times a day for 7 days. 28 tablet 0     VITAMIN D3 2,000 unit capsule TAKE 1 CAPSULE BY MOUTH ONCE DAILY 30 capsule 11     No current facility-administered medications for this visit.      DETAILED EXAMINATION  08/05/19  :  There were no vitals filed for this visit.  Alert oriented. Head including the face is examined for malar rash, heliotropes, scarring, lupus pernio. Eyes examined for redness such as in episcleritis/scleritis, periorbital lesions.   Neck/ Face examined for parotid gland swelling, range of motion of neck.  Left upper and lower and right upper and lower extremities examined for tenderness, swelling, warmth of the appendicular joints, range of motion, edema, rash.  Some of the important findings included: He does not have synovitis in any of the palpable joints of upper extremities, full range of motion of the shoulders.  Knees without effusion warmth or JLT.  He has age-appropriate range of motion of the C-spine.  Flexion caused reproduction of the symptoms.                   LAB / IMAGING DATA:  ALT   Date Value  Ref Range Status   07/29/2019 29 0 - 45 U/L Final   05/05/2019 26 0 - 45 U/L Final   11/12/2018 31 0 - 45 U/L Final     Albumin   Date Value Ref Range Status   07/29/2019 4.5 3.5 - 5.0 g/dL Final   05/05/2019 4.3 3.5 - 5.0 g/dL Final   11/12/2018 4.1 3.5 - 5.0 g/dL Final     Creatinine   Date Value Ref Range Status   07/29/2019 1.24 0.70 - 1.30 mg/dL Final   05/05/2019 1.10 0.70 - 1.30 mg/dL Final   04/04/2019 1.13 0.70 - 1.30 mg/dL Final       WBC   Date Value Ref Range Status   07/29/2019 6.1 4.0 - 11.0 thou/uL Final   05/05/2019 4.9 4.0 - 11.0 thou/uL Final     Hemoglobin   Date Value Ref Range Status   07/29/2019 15.1 14.0 - 18.0 g/dL Final   05/05/2019 14.2 14.0 - 18.0 g/dL Final   04/04/2019 15.0 14.0 - 18.0 g/dL Final     Platelets   Date Value Ref Range Status   07/29/2019 146 140 - 440 thou/uL Final   05/05/2019 165 140 - 440 thou/uL Final   04/04/2019 162 140 - 440 thou/uL Final       Lab Results   Component Value Date    RF <15.0 02/13/2017    SEDRATE 2 04/04/2019

## 2021-06-01 VITALS — BODY MASS INDEX: 25.58 KG/M2 | WEIGHT: 163 LBS | HEIGHT: 67 IN

## 2021-06-01 VITALS — BODY MASS INDEX: 25.53 KG/M2 | WEIGHT: 163 LBS

## 2021-06-01 VITALS — WEIGHT: 162 LBS | BODY MASS INDEX: 25.37 KG/M2

## 2021-06-01 VITALS — WEIGHT: 163 LBS | BODY MASS INDEX: 25.53 KG/M2

## 2021-06-01 VITALS — BODY MASS INDEX: 25.69 KG/M2 | WEIGHT: 164 LBS

## 2021-06-01 NOTE — TELEPHONE ENCOUNTER
Dr. Banks patient    Daughter called. Patient is concern about his vision loss due to taking hydroxychloroquine (PLAQUENIL). Patient saw his eye doctor on Friday and was told he will lose his vision in 1 year.   Please advise on what to do.     Michelle-daughter is not on consent form. When calling please call with [facility] .    Nesha @ [phone]

## 2021-06-01 NOTE — PROGRESS NOTES
ASSESSMENT AND PLAN:  Nesha Newton 58 y.o. male is a for follow-up of undifferentiated inflammatory arthropathy doing great with hydroxychloroquine.  He noted recent visit with ophthalmologist.  The examination was noted to be normal.  They however suggested that he should look for alternative treatment as hydroxychloroquine can cause eye problems.  We revisited this issue in detail.  Various options were reviewed.  For now it would be more appropriate for him to stay on the hydroxychloroquine and make sure that he continues to have the regular eye examination.  If it any time it is found that he has retinal changes commensurate with hydroxychloroquine side effects he cannot take it anymore and then alternatives to be discussed.  He has noted a tremor which is intentional.  He is going to check with his primary physician.  Will meet here in 4 months.    Diagnoses and all orders for this visit:    Inflammatory polyarthropathy of hand (H)    Polyarthralgia    High risk medication use    Tremor      HISTORY OF PRESENTING ILLNESS:  Nesha Newton, 58 y.o., male is here for follow up of inflammatory polyarthritis, doing great on hydroxychloroquine, had eye examination, as noted above ophthalmology comments were reviewed with him. He feels significantly better.  He rated 90% improvement.    He is able to do most of his day-to-day activities without any or with some difficulty.  He had worsening of pain in the knees but he is known to have osteoarthritis.  He went to the orthopedics and had corticosteroid injections with good effect.  He noted no fever weight loss blurry vision eye redness mouth also nausea cough.  He is taking hydroxychloroquine.  He has had injection into his knees done in December elsewhere.  In the past for inflammatory arthropathy diclofenac did not help.  We discussed the possibility that it might help him with the osteoarthritis symptoms as well as the left shoulder pain.  He would like to try  this.  Major side effects including GI renal and heart were discussed.  That has helped him very significantly.  There is no family history of rheumatoid arthritis, lupus.  Other  historical information and ADL limitations as noted in the multidimensional health assessment questionnaire attached in the EMR.    ALLERGIES:Tramadol    PAST MEDICAL/ACTIVE PROBLEMS/MEDICATION/ FAMILY HISTORY/SOCIAL DATA:  The patient has a family history of  Past Medical History:   Diagnosis Date     Chronic gastritis without bleeding, unspecified gastritis type 4/3/2018     Coccidioidomycosis     Created by Conversion      GERD (gastroesophageal reflux disease)      Hepatic steatosis 4/24/2017    HCV neg,      Osteoarthritis Of The Knee     Created by Conversion  Replacement Utility updated for latest IMO load     Polyarthralgia 4/5/2017     Vitamin D deficiency      Social History     Tobacco Use   Smoking Status Never Smoker   Smokeless Tobacco Never Used     Patient Active Problem List   Diagnosis     Vitamin D Deficiency     Osteoarthritis Of The Knee     Chronic Eosinophilic Pneumonia     Right upper quadrant abdominal pain     GERD (gastroesophageal reflux disease)     Polyarthralgia     Hepatic steatosis     Inflammatory polyarthropathy of hand (H)     Adenomatous polyp of colon, unspecified part of colon     Primary osteoarthritis of left knee     Primary osteoarthritis of right knee     Chronic gastritis without bleeding, unspecified gastritis type     Right shoulder tendinitis     Right carpal tunnel syndrome     Current Outpatient Medications   Medication Sig Dispense Refill     aspirin 81 MG EC tablet Take 1 tablet (81 mg total) by mouth daily. 150 tablet 2     cholecalciferol, vitamin D3, 2,000 unit Tab 1 po qd 30 tablet 11     clotrimazole-betamethasone (LOTRISONE) cream Apply two times a day to neck rash 30 g 0     diclofenac (VOLTAREN) 75 MG EC tablet TAKE 1 TABLET BY MOUTH AT BEDTIME 90 tablet 0     docusate sodium  "(COLACE) 100 MG capsule Take 1 capsule (100 mg total) by mouth daily as needed for constipation. 30 capsule 2     famotidine (PEPCID) 40 MG tablet Take 1 tablet (40 mg total) by mouth every evening. 30 tablet 6     hydroxychloroquine (PLAQUENIL) 200 mg tablet TAKE ONE TABLET BY MOUTH TWICE DAILY 180 tablet 0     methylcellulose (CITRUCEL SUGAR FREE) Powd Take 6 g by mouth daily. 540 g 3     pantoprazole (PROTONIX) 40 MG tablet Take 1 tablet (40 mg total) by mouth daily. 30 tablet 1     polyethylene glycol (GLYCOLAX) 17 gram/dose powder Take 17 g by mouth daily. 510 g 11     VITAMIN D3 2,000 unit capsule TAKE 1 CAPSULE BY MOUTH ONCE DAILY 30 capsule 11     No current facility-administered medications for this visit.      DETAILED EXAMINATION  09/26/19  :  Vitals:    09/26/19 1251   BP: 112/78   Patient Site: Right Arm   Patient Position: Sitting   Cuff Size: Adult Regular   Pulse: 80   Weight: 157 lb (71.2 kg)   Height: 5' 4\" (1.626 m)     Alert oriented. Head including the face is examined for malar rash, heliotropes, scarring, lupus pernio. Eyes examined for redness such as in episcleritis/scleritis, periorbital lesions.   Neck/ Face examined for parotid gland swelling, range of motion of neck.  Left upper and lower and right upper and lower extremities examined for tenderness, swelling, warmth of the appendicular joints, range of motion, edema, rash.  Some of the important findings included: he does not have evidence of synovitis in the palpable joints of the upper extremities.  No significant deformities of the digits.  minimal Heberden nodes.  Range of motion of the shoulders show full abduction.  No JLT effusion or warmth of the knees.  He has intentional tremor demonstrated in the office when he holds onto something.                   LAB / IMAGING DATA:  ALT   Date Value Ref Range Status   07/29/2019 29 0 - 45 U/L Final   05/05/2019 26 0 - 45 U/L Final   11/12/2018 31 0 - 45 U/L Final     Albumin   Date Value " Ref Range Status   07/29/2019 4.5 3.5 - 5.0 g/dL Final   05/05/2019 4.3 3.5 - 5.0 g/dL Final   11/12/2018 4.1 3.5 - 5.0 g/dL Final     Creatinine   Date Value Ref Range Status   07/29/2019 1.24 0.70 - 1.30 mg/dL Final   05/05/2019 1.10 0.70 - 1.30 mg/dL Final   04/04/2019 1.13 0.70 - 1.30 mg/dL Final       WBC   Date Value Ref Range Status   07/29/2019 6.1 4.0 - 11.0 thou/uL Final   05/05/2019 4.9 4.0 - 11.0 thou/uL Final     Hemoglobin   Date Value Ref Range Status   07/29/2019 15.1 14.0 - 18.0 g/dL Final   05/05/2019 14.2 14.0 - 18.0 g/dL Final   04/04/2019 15.0 14.0 - 18.0 g/dL Final     Platelets   Date Value Ref Range Status   07/29/2019 146 140 - 440 thou/uL Final   05/05/2019 165 140 - 440 thou/uL Final   04/04/2019 162 140 - 440 thou/uL Final       Lab Results   Component Value Date    RF <15.0 02/13/2017    SEDRATE 2 04/04/2019

## 2021-06-01 NOTE — TELEPHONE ENCOUNTER
Notes from [facility] from 9/6/19 states pt was cleared to continue HCQ. Notified pt of this via Language line  however pt states that the eye doctor told him at the appt to stop taking HCQ as it was going to cause him to go blind so pt has not taken this for the past 2 weeks or so. Pt would like to see Dr Banks sooner than November to discuss this and what other options there are for treatment. Added pt to 9/26 at 12:30p.

## 2021-06-02 VITALS — WEIGHT: 161 LBS | BODY MASS INDEX: 25.22 KG/M2

## 2021-06-02 VITALS — BODY MASS INDEX: 25.27 KG/M2 | WEIGHT: 161 LBS | HEIGHT: 67 IN

## 2021-06-02 VITALS — BODY MASS INDEX: 25.22 KG/M2 | WEIGHT: 161 LBS

## 2021-06-02 VITALS — BODY MASS INDEX: 25.06 KG/M2 | WEIGHT: 160 LBS

## 2021-06-02 NOTE — TELEPHONE ENCOUNTER
RN cannot approve Refill Request    RN can NOT refill this medication medication not on med list. Last office visit: 7/30/2019 Max Mart MD Last Physical: 11/12/2018 Last MTM visit: Visit date not found Last visit same specialty: 7/30/2019 Max Mart MD.  Next visit within 3 mo: Visit date not found  Next physical within 3 mo: Visit date not found      Sreedhar Germain, [facility] Connection Triage/Med Refill 10/8/2019    Requested Prescriptions   Pending Prescriptions Disp Refills     VERNON' LIQUI-GELS 100 mg capsule [Pharmacy Med Name: PH LIQUI-GEL 100MG  CAP] 30 capsule 6     Sig: TAKE 1 CAPSULE BY MOUTH ONCE DAILY AS NEEDED FOR  CONSTIPATION       GI Medications Refill Protocol Passed - 10/7/2019 10:23 AM        Passed - PCP or prescribing provider visit in last 12 or next 3 months.     Last office visit with prescriber/PCP: 7/30/2019 Max Mart MD OR same dept: 7/30/2019 Max Mart MD OR same specialty: 7/30/2019 Max Mart MD  Last physical: 11/12/2018 Last MTM visit: Visit date not found   Next visit within 3 mo: Visit date not found  Next physical within 3 mo: Visit date not found  Prescriber OR PCP: Max Mart MD  Last diagnosis associated with med order: 1. Constipation  - VERNON' LIQUI-GELS 100 mg capsule [Pharmacy Med Name: PH LIQUI-GEL 100MG  CAP]; TAKE 1 CAPSULE BY MOUTH ONCE DAILY AS NEEDED FOR  CONSTIPATION  Dispense: 30 capsule; Refill: 6    If protocol passes may refill for 12 months if within 3 months of last provider visit (or a total of 15 months).

## 2021-06-02 NOTE — PROGRESS NOTES
Subjective: Patient comes in for evaluation he has had a tremor.  He is noticed this over the last 5 to 6 months    He denies any family history for this    Is more of an intention tremor.    He notices when he is writing or when he uses a spoon to eat cereal etc.    Also needed a flu shot today    Denies any stiffness denies any visual problems.    Continues to see rheumatology in for his polyarthralgia.  He is on Plaquenil.  Labs have been okay.    Tobacco status: He  reports that he has never smoked. He has never used smokeless tobacco.    Patient Active Problem List    Diagnosis Date Noted     High risk medication use 09/26/2019     Tremor 09/26/2019     Right carpal tunnel syndrome 01/10/2019     Right shoulder tendinitis 06/07/2018     Primary osteoarthritis of left knee 04/03/2018     Primary osteoarthritis of right knee 04/03/2018     Chronic gastritis without bleeding, unspecified gastritis type 04/03/2018     Adenomatous polyp of colon, unspecified part of colon 06/06/2017     Inflammatory polyarthropathy of hand (H) 05/03/2017     Hepatic steatosis 04/24/2017     Polyarthralgia 04/05/2017     Right upper quadrant abdominal pain 12/20/2016     GERD (gastroesophageal reflux disease)      Vitamin D Deficiency      Osteoarthritis Of The Knee      Chronic Eosinophilic Pneumonia        Current Outpatient Medications   Medication Sig Dispense Refill     aspirin 81 MG EC tablet Take 1 tablet (81 mg total) by mouth daily. 150 tablet 2     cholecalciferol, vitamin D3, 2,000 unit Tab 1 po qd 30 tablet 11     clotrimazole-betamethasone (LOTRISONE) cream Apply two times a day to neck rash 30 g 0     diclofenac (VOLTAREN) 75 MG EC tablet TAKE 1 TABLET BY MOUTH AT BEDTIME 90 tablet 0     docusate sodium (COLACE) 100 MG capsule Take 1 capsule (100 mg total) by mouth daily as needed for constipation. 30 capsule 2     famotidine (PEPCID) 40 MG tablet Take 1 tablet (40 mg total) by mouth every evening. 30 tablet 6      hydroxychloroquine (PLAQUENIL) 200 mg tablet TAKE ONE TABLET BY MOUTH TWICE DAILY 180 tablet 0     methylcellulose (CITRUCEL SUGAR FREE) Powd Take 6 g by mouth daily. 540 g 3     pantoprazole (PROTONIX) 40 MG tablet Take 1 tablet (40 mg total) by mouth daily. 30 tablet 1     VERNON' LIQUI-GELS 100 mg capsule TAKE 1 CAPSULE BY MOUTH ONCE DAILY AS NEEDED FOR  CONSTIPATION 30 capsule 6     polyethylene glycol (GLYCOLAX) 17 gram/dose powder Take 17 g by mouth daily. 510 g 11     VITAMIN D3 2,000 unit capsule TAKE 1 CAPSULE BY MOUTH ONCE DAILY 30 capsule 11     No current facility-administered medications for this visit.        ROS:   10 point review of systems positive as outlined above, otherwise negative    Objective:    /80 (Patient Site: Left Arm, Patient Position: Sitting, Cuff Size: Adult Regular)   Pulse 84   Resp 20   Wt 162 lb (73.5 kg)   BMI 27.81 kg/m    Body mass index is 27.81 kg/m .      General appearance no acute distress    HEENT canals and TMs normal oropharynx clear pupils react normally    Cranial nerves intact    Gait was normal, no shuffling.    No rigidity.    Extremities without edema.    The patient does have an intention tremor fine.    Skin without rash    Lungs were clear no rales or rhonchi heart was regular rate in the 80s.        Assessment:  1. Intention tremor  Ambulatory referral to Neurology   2. Chronic gastritis without bleeding, unspecified gastritis type     3. Inflammatory polyarthropathy of hand (H)       Intention tremor, referral to neurology    Gastritis, continue pantoprazole    Inflammatory polyarthropathy of the hand no synovitis noted, he is on Plaquenil.    Patient also continues on other medicines including his vitamin D  Plan: As discussed above    Discussed possible beta-blocker.  Elected to have him see neurology to rule out any additional cause..    Flu shot risk-benefit discussed and given    This transcription uses voice recognition software, which may  contain typographical errors.

## 2021-06-03 VITALS
BODY MASS INDEX: 26.8 KG/M2 | HEART RATE: 80 BPM | SYSTOLIC BLOOD PRESSURE: 112 MMHG | HEIGHT: 64 IN | DIASTOLIC BLOOD PRESSURE: 78 MMHG | WEIGHT: 157 LBS

## 2021-06-03 VITALS
RESPIRATION RATE: 20 BRPM | WEIGHT: 162 LBS | DIASTOLIC BLOOD PRESSURE: 80 MMHG | SYSTOLIC BLOOD PRESSURE: 106 MMHG | HEART RATE: 84 BPM | BODY MASS INDEX: 27.81 KG/M2

## 2021-06-03 VITALS — BODY MASS INDEX: 25.22 KG/M2 | WEIGHT: 161 LBS

## 2021-06-03 VITALS — BODY MASS INDEX: 26.95 KG/M2 | WEIGHT: 157 LBS

## 2021-06-03 VITALS — WEIGHT: 158.25 LBS | BODY MASS INDEX: 24.79 KG/M2

## 2021-06-03 VITALS — HEIGHT: 64 IN | BODY MASS INDEX: 26.8 KG/M2 | WEIGHT: 157 LBS

## 2021-06-04 ENCOUNTER — HOSPITAL ENCOUNTER (OUTPATIENT)
Dept: CT IMAGING | Facility: CLINIC | Age: 61
Discharge: HOME | End: 2021-06-04
Attending: PHYSICIAN ASSISTANT

## 2021-06-04 ENCOUNTER — RECORDS - HEALTHEAST (OUTPATIENT)
Dept: ADMINISTRATIVE | Facility: OTHER | Age: 61
End: 2021-06-04

## 2021-06-04 VITALS
SYSTOLIC BLOOD PRESSURE: 108 MMHG | DIASTOLIC BLOOD PRESSURE: 76 MMHG | HEIGHT: 64 IN | HEART RATE: 74 BPM | BODY MASS INDEX: 26.8 KG/M2 | WEIGHT: 157 LBS

## 2021-06-04 VITALS
TEMPERATURE: 97.4 F | RESPIRATION RATE: 16 BRPM | BODY MASS INDEX: 27.46 KG/M2 | WEIGHT: 160 LBS | SYSTOLIC BLOOD PRESSURE: 112 MMHG | DIASTOLIC BLOOD PRESSURE: 74 MMHG | HEART RATE: 70 BPM

## 2021-06-04 VITALS
SYSTOLIC BLOOD PRESSURE: 121 MMHG | BODY MASS INDEX: 27.46 KG/M2 | RESPIRATION RATE: 20 BRPM | TEMPERATURE: 97.8 F | HEART RATE: 68 BPM | WEIGHT: 160 LBS | DIASTOLIC BLOOD PRESSURE: 84 MMHG

## 2021-06-04 DIAGNOSIS — R10.32 LLQ PAIN: ICD-10-CM

## 2021-06-04 DIAGNOSIS — K62.5 RECTAL BLEEDING: ICD-10-CM

## 2021-06-05 VITALS — BODY MASS INDEX: 26.98 KG/M2 | WEIGHT: 158 LBS | HEIGHT: 64 IN

## 2021-06-05 VITALS
WEIGHT: 155.5 LBS | SYSTOLIC BLOOD PRESSURE: 117 MMHG | TEMPERATURE: 98.1 F | BODY MASS INDEX: 26.69 KG/M2 | DIASTOLIC BLOOD PRESSURE: 82 MMHG | HEART RATE: 71 BPM

## 2021-06-05 VITALS — HEIGHT: 64 IN | BODY MASS INDEX: 26.98 KG/M2 | WEIGHT: 158 LBS

## 2021-06-05 VITALS
TEMPERATURE: 98.1 F | SYSTOLIC BLOOD PRESSURE: 114 MMHG | WEIGHT: 158.25 LBS | DIASTOLIC BLOOD PRESSURE: 65 MMHG | BODY MASS INDEX: 27.16 KG/M2 | HEART RATE: 82 BPM

## 2021-06-05 NOTE — PROGRESS NOTES
ASSESSMENT AND PLAN:  Nesha Newton 59 y.o. male is a for follow-up.  He has undifferentiated inflammatory polyarthritis.  He is done great with hydroxychloroquine to the point where he reduce the dose himself from twice daily to once daily.  He had his eyes examined recently within acceptable range without maculopathy.  Recent labs show normal hepatic renal and marrow function.  He is to continue 200 mg daily of hydroxychloroquine.  We will meet here in 6 months or sooner.         Diagnoses and all orders for this visit:    Inflammatory polyarthropathy of hand (H)  -     hydroxychloroquine (PLAQUENIL) 200 mg tablet; Take 1 tablet (200 mg total) by mouth daily.  Dispense: 90 tablet; Refill: 0    Polyarthralgia  -     hydroxychloroquine (PLAQUENIL) 200 mg tablet; Take 1 tablet (200 mg total) by mouth daily.  Dispense: 90 tablet; Refill: 0    High risk medication use    Right shoulder tendinitis      HISTORY OF PRESENTING ILLNESS:  Nesha Newton, 59 y.o., male is here for follow up of inflammatory polyarthritis, doing great on hydroxychloroquine, had eye examination, as noted above ophthalmology comments were reviewed with him. He feels significantly better.  He noted very little residual pain.  In the morning the right shoulder troubles him for about an hour.  He is able to do all his day-to-day activities without difficulty.  He has noted eye examination a few months ago.  Recently he was on the emergency room for a lower abdominal pain had labs drawn which are reviewed.  His pain has subsided since..    He is able to do most of his day-to-day activities without any or with some difficulty.  He had worsening of pain in the knees but he is known to have osteoarthritis.  He went to the orthopedics and had corticosteroid injections with good effect.  He noted no fever weight loss blurry vision eye redness mouth also nausea cough.  He is taking hydroxychloroquine.  He has had injection into his knees done in  December elsewhere.  In the past for inflammatory arthropathy diclofenac did not help.  We discussed the possibility that it might help him with the osteoarthritis symptoms as well as the left shoulder pain.  He would like to try this.  Major side effects including GI renal and heart were discussed.  That has helped him very significantly.  There is no family history of rheumatoid arthritis, lupus.  Other  historical information and ADL limitations as noted in the multidimensional health assessment questionnaire attached in the EMR.    ALLERGIES:Tramadol and Primidone    PAST MEDICAL/ACTIVE PROBLEMS/MEDICATION/ FAMILY HISTORY/SOCIAL DATA:  The patient has a family history of  Past Medical History:   Diagnosis Date     Chronic gastritis without bleeding, unspecified gastritis type 4/3/2018     Coccidioidomycosis     Created by Conversion      GERD (gastroesophageal reflux disease)      Hepatic steatosis 4/24/2017    HCV neg,      Osteoarthritis Of The Knee     Created by Conversion  Replacement Utility updated for latest IMO load     Polyarthralgia 4/5/2017     Vitamin D deficiency      Social History     Tobacco Use   Smoking Status Never Smoker   Smokeless Tobacco Never Used     Patient Active Problem List   Diagnosis     Vitamin D Deficiency     Osteoarthritis Of The Knee     Chronic Eosinophilic Pneumonia     Right upper quadrant abdominal pain     GERD (gastroesophageal reflux disease)     Polyarthralgia     Hepatic steatosis     Inflammatory polyarthropathy of hand (H)     Adenomatous polyp of colon, unspecified part of colon     Primary osteoarthritis of left knee     Primary osteoarthritis of right knee     Chronic gastritis without bleeding, unspecified gastritis type     Right shoulder tendinitis     Right carpal tunnel syndrome     High risk medication use     Tremor     Benign neoplasm of transverse colon     Other hemorrhoids     Polyp of colon     Unspecified abdominal pain     Current Outpatient  "Medications   Medication Sig Dispense Refill     acetaminophen (TYLENOL) 500 MG tablet Take 2 tablets (1,000 mg total) by mouth every 8 (eight) hours as needed for pain. 30 tablet 0     aspirin 81 MG EC tablet Take 1 tablet (81 mg total) by mouth daily. 90 tablet 1     cholecalciferol, vitamin D3, 2,000 unit Tab 1 po qd 30 tablet 11     clotrimazole-betamethasone (LOTRISONE) cream Apply two times a day to neck rash 30 g 0     diclofenac (VOLTAREN) 75 MG EC tablet TAKE 1 TABLET BY MOUTH AT BEDTIME 90 tablet 0     docusate sodium (COLACE) 100 MG capsule Take 1 capsule (100 mg total) by mouth daily as needed for constipation. 30 capsule 2     famotidine (PEPCID) 40 MG tablet Take 1 tablet (40 mg total) by mouth every evening. 30 tablet 6     hydroxychloroquine (PLAQUENIL) 200 mg tablet TAKE ONE TABLET BY MOUTH TWICE DAILY 180 tablet 0     ibuprofen (ADVIL,MOTRIN) 800 MG tablet Take 1 tablet (800 mg total) by mouth every 8 (eight) hours as needed for pain. 21 tablet 0     methylcellulose (CITRUCEL SUGAR FREE) Powd Take 6 g by mouth daily. 540 g 3     pantoprazole (PROTONIX) 40 MG tablet Take 1 tablet (40 mg total) by mouth daily. 30 tablet 1     VERNON' LIQUI-GELS 100 mg capsule TAKE 1 CAPSULE BY MOUTH ONCE DAILY AS NEEDED FOR  CONSTIPATION 30 capsule 6     polyethylene glycol (GLYCOLAX) 17 gram/dose powder Take 17 g by mouth daily. 510 g 11     VITAMIN D3 2,000 unit capsule TAKE 1 CAPSULE BY MOUTH ONCE DAILY 30 capsule 11     No current facility-administered medications for this visit.      DETAILED EXAMINATION  01/28/20  :  Vitals:    01/28/20 0832   BP: 108/76   Patient Site: Right Arm   Patient Position: Sitting   Cuff Size: Adult Regular   Pulse: 74   Weight: 157 lb (71.2 kg)   Height: 5' 4\" (1.626 m)     Alert oriented. Head including the face is examined for malar rash, heliotropes, scarring, lupus pernio. Eyes examined for redness such as in episcleritis/scleritis, periorbital lesions.   Neck/ Face examined " for parotid gland swelling, range of motion of neck.  Left upper and lower and right upper and lower extremities examined for tenderness, swelling, warmth of the appendicular joints, range of motion, edema, rash.  Some of the important findings included: he does not have evidence of synovitis in the palpable joints of the upper extremities.  No significant deformities of the digits.  minimal Heberden nodes.  Range of motion of the shoulders show full abduction.  No JLT effusion or warmth of the knees.  He did not have an  with him.  Marcela was used.                 LAB / IMAGING DATA:  ALT   Date Value Ref Range Status   12/29/2019 17 0 - 45 U/L Final   07/29/2019 29 0 - 45 U/L Final   05/05/2019 26 0 - 45 U/L Final     Albumin   Date Value Ref Range Status   12/29/2019 4.5 3.5 - 5.0 g/dL Final   07/29/2019 4.5 3.5 - 5.0 g/dL Final   05/05/2019 4.3 3.5 - 5.0 g/dL Final     Creatinine   Date Value Ref Range Status   12/29/2019 1.11 0.70 - 1.30 mg/dL Final   11/21/2019 1.09 0.70 - 1.30 mg/dL Final   07/29/2019 1.24 0.70 - 1.30 mg/dL Final       WBC   Date Value Ref Range Status   12/29/2019 5.1 4.0 - 11.0 thou/uL Final   11/21/2019 6.3 4.0 - 11.0 thou/uL Final     Hemoglobin   Date Value Ref Range Status   12/29/2019 14.4 14.0 - 18.0 g/dL Final   11/21/2019 13.8 (L) 14.0 - 18.0 g/dL Final   07/29/2019 15.1 14.0 - 18.0 g/dL Final     Platelets   Date Value Ref Range Status   12/29/2019 135 (L) 140 - 440 thou/uL Final     Comment:     Giant platelets present    11/21/2019 176 140 - 440 thou/uL Final   07/29/2019 146 140 - 440 thou/uL Final       Lab Results   Component Value Date    RF <15.0 02/13/2017    SEDRATE 2 04/04/2019

## 2021-06-07 NOTE — TELEPHONE ENCOUNTER
Who is calling:  Patient's Daughter  Reason for Call:  Patient is needing to be seen per daughter/patient is having stomach issues/hernia is coming back/Patient's daughter insisted he be seen.   Date of last appointment with primary care: NA  Okay to leave a detailed message: Yes, Please contact patient at [phone]

## 2021-06-07 NOTE — TELEPHONE ENCOUNTER
Prior Authorization Request  Who s requesting:  Pharmacy  Pharmacy Name and Location: [facility][id number]  Medication Name: hydrocortisone-pramoxine (ANALPRAM-HC) 2.5-1 % rectal cream   Insurance Plan: [employer] [place]   Insurance Member ID Number:  [id number]  CoverMyMeds Key: N/A   Shaka/priorauthportal code: [id number]   Informed patient that prior authorizations can take up to 10 business days for response:   NA  Okay to leave a detailed message: No

## 2021-06-07 NOTE — TELEPHONE ENCOUNTER
This is not a crucial medication- not worth the trouble of pushing for it  I would just work on warm soaks and keeping stool soft with fiber supplement and stool softeners as he already is

## 2021-06-07 NOTE — TELEPHONE ENCOUNTER
FYI - Status Update  Who is Calling: DaughterMalgorzata    [phone]  Update: Calling in as patient has not yet received a call back on earlier call.  Caller states the reason patient needs to be seen is for hemorrhoid problem.   Okay to leave a detailed message?:  Yes

## 2021-06-07 NOTE — TELEPHONE ENCOUNTER
Called and spoke with daughter in ctc . Message was given. No other questions. She understood the message.   Appointment was offered and make.

## 2021-06-07 NOTE — TELEPHONE ENCOUNTER
Central PA team  [phone]  Pool: HE PA MED ([id number])          PA has been initiated.       PA form completed and faxed insurance via Cover My Meds     Key:  [id number]     Medication:  Hydrocortisone Ace-Pramoxine 2.5-1% cream    Insurance:  [employer] [place]        Response will be received via fax and may take up to 5-10 business days depending on plan

## 2021-06-07 NOTE — PROGRESS NOTES
Assessment/ Plan  Exam done today with help of phone , over 25 minutes spent, greater than 50% of this counseling regarding following issues.  1. Thrombosed external hemorrhoids  Discussed options, discouraged I&D referral today.  Recommend soaking, keeping stool soft, anticipate gradual resolution.  This is a recurrence of previous problem.  Continue daily Metamucil plus Colace.  External hemorrhoid cream prescribed  - docusate sodium (COLACE) 100 MG capsule; Take 1 capsule (100 mg total) by mouth 2 (two) times a day.  Dispense: 60 capsule; Refill: 2  - hydrocortisone-pramoxine (ANALPRAM-HC) 2.5-1 % rectal cream; Insert into the rectum 3 (three) times a day. Apply  Externally bid as directed  Dispense: 30 g; Refill: 1    2. Lower abdominal pain  Sounds like this is most likely constipation related since Metamucil has resolved the problem.  Tylenol prescribed for discomfort, primarily from hemorrhoids  - acetaminophen (TYLENOL) 500 MG tablet; Take 2 tablets (1,000 mg total) by mouth every 6 (six) hours as needed for pain.  Dispense: 100 tablet; Refill: 3    Body mass index is 27.46 kg/m .    Subjective  CC:  Chief Complaint   Patient presents with     Follow-up     hemorriod     HPI:  Anal/Rectal pain  Narrative: colonoscopy last year  Needs to do every 5 years   Now rectum comes out and is painful    --------------------  Duration/ Timing: 10 days  Onset/ Context: Fairly sudden  Description of pain/ severity: Sharp, difficult sitting down  Any lump/ other abnormality in perianal region?  Yes lump, no other abnormality  Any bleeding? No,  Constipation/ firm stool? Previously had constipation, but better since taking metamucil which was started 1 week ago  Diarrhea? no  Previous history of pain/ anal problems?  No    Abdominal Pain  Narrative now have very bad abdominal burning  Used to be down below  Better after using metamucil  Describes the pain is generalized  Patient has gastritis listed on the  chart.  Does take nonsteroidal medication inflammatory polyarthropathy  Requesting medication for discomfort.    Currently using Colace and over-the-counter Metamucil as noted        Patient Active Problem List   Diagnosis     Vitamin D Deficiency     Osteoarthritis Of The Knee     Chronic Eosinophilic Pneumonia     Right upper quadrant abdominal pain     GERD (gastroesophageal reflux disease)     Polyarthralgia     Hepatic steatosis     Inflammatory polyarthropathy of hand (H)     Adenomatous polyp of colon, unspecified part of colon     Primary osteoarthritis of left knee     Primary osteoarthritis of right knee     Chronic gastritis without bleeding, unspecified gastritis type     Right shoulder tendinitis     Right carpal tunnel syndrome     High risk medication use     Tremor     Benign neoplasm of transverse colon     Other hemorrhoids     Polyp of colon     Unspecified abdominal pain     Current medications reviewed as follows:  Current Outpatient Medications on File Prior to Visit   Medication Sig     aspirin 81 MG EC tablet Take 1 tablet (81 mg total) by mouth daily.     cholecalciferol, vitamin D3, 2,000 unit Tab 1 po qd     clotrimazole-betamethasone (LOTRISONE) cream Apply two times a day to neck rash     diclofenac (VOLTAREN) 75 MG EC tablet TAKE 1 TABLET BY MOUTH AT BEDTIME     docusate sodium (COLACE) 100 MG capsule Take 1 capsule (100 mg total) by mouth daily as needed for constipation.     famotidine (PEPCID) 40 MG tablet Take 1 tablet (40 mg total) by mouth every evening.     hydroxychloroquine (PLAQUENIL) 200 mg tablet Take 1 tablet (200 mg total) by mouth daily.     ibuprofen (ADVIL,MOTRIN) 600 MG tablet 1 po three times a day prn pain     methylcellulose (CITRUCEL SUGAR FREE) Powd Take 6 g by mouth daily.     pantoprazole (PROTONIX) 40 MG tablet Take 1 tablet (40 mg total) by mouth daily.     VERNON' LIQUI-GELS 100 mg capsule TAKE 1 CAPSULE BY MOUTH ONCE DAILY AS NEEDED FOR  CONSTIPATION      polyethylene glycol (GLYCOLAX) 17 gram/dose powder Take 17 g by mouth daily.     VITAMIN D3 2,000 unit capsule TAKE 1 CAPSULE BY MOUTH ONCE DAILY     propranoloL (INDERAL LA) 60 mg 24 hr capsule Take 60 mg by mouth daily.     [DISCONTINUED] acetaminophen (TYLENOL) 500 MG tablet Take 2 tablets (1,000 mg total) by mouth every 8 (eight) hours as needed for pain.     No current facility-administered medications on file prior to visit.      Social History     Tobacco Use   Smoking Status Never Smoker   Smokeless Tobacco Never Used     Social History     Social History Narrative     Not on file     Patient Care Team:  Max Mart MD as PCP - General (Family Medicine)  Max Mart MD as Assigned PCP  ROS  Full 10 system review including constitutional, respiratory, cardiac, gi, urinary, rheumatologic, neurologic, reproductive, dermatologic psychiatric is  performed  and is otherwise negative         Objective  Physical Exam  Vitals:    04/09/20 1001   BP: 112/74   Patient Site: Left Arm   Patient Position: Sitting   Cuff Size: Adult Regular   Pulse: 70   Resp: 16   Temp: 97.4  F (36.3  C)   Weight: 160 lb (72.6 kg)     Abdomen soft and nontender.  Left-sided thrombosed hemorrhoid, no ulceration.  Digital rectal exam not done.  Diagnostics  None    Please note: Voice recognition software was used in this dictation.  It may therefore contain typographical errors.

## 2021-06-08 NOTE — PROGRESS NOTES
Assessment/ Plan  1. Gastroesophageal reflux disease, esophagitis presence not specified  Symptoms poorly controlled, particularly at night  Reviewed dietary recommendations  Increase omeprazole to 40 mg a day  Bed up on blocks  If not improving over the next 1-2 months, referral to GI for upper endoscopy    2. Vitamin D deficiency  On 50,000 IU vitamin D for about a year, he claims.  Check level  Reduce to 2000 daily    - Vitamin D, Total (25-Hydroxy)    3. Arthralgia  Using ibuprofen  Discussed possible gastritis/PUD which could be confused with GERD  Stop this, change to acetaminophen    Body mass index is 27.9 kg/(m^2).    Subjective  CC:  Chief Complaint   Patient presents with     Gastroesophageal Reflux     burning pain bellly area and upper esophogus. Sour taste to the mouth     HPI:        GERD  Patient assessment of control: poor having worse symptoms- just today.  Woke up at midnight with problem,   Sx present for the last 1 year + .   Medication omeprazole- 20 mg.    Duration of treatment:1.5 years  Taking Dietary precautions? Some- discussed  Frequency of daytime symptoms: not often  Frequency of nighttime symptoms: most often. - 1-2 / month  History of endoscopy/ [facility]? no  Comments: ER 1 year ago- when problem started    Taking ibuprofen fairly reg for pain- never tried acetaminophen and pain not severe.    Vitamin d 50,000 q wk x about 1 year        PFSH:  Current medications reviewed as follows:  Current Outpatient Prescriptions on File Prior to Visit   Medication Sig     aspirin 81 MG EC tablet Take 1 tablet (81 mg total) by mouth daily.     docusate sodium (COLACE) 100 MG capsule Take 1 capsule (100 mg total) by mouth daily as needed for constipation.     ergocalciferol (ERGOCALCIFEROL) 50,000 unit capsule Take 50,000 Units by mouth once a week.     [DISCONTINUED] ibuprofen (ADVIL,MOTRIN) 400 MG tablet Take 1 tablet (400 mg total) by mouth every 6 (six) hours as needed for pain.      "[DISCONTINUED] omeprazole (PRILOSEC) 20 MG capsule Take 1 capsule (20 mg total) by mouth daily.     polyethylene glycol (GLYCOLAX) 17 gram/dose powder 17 gm in 8 oz water daily     predniSONE (DELTASONE) 10 MG tablet 30mg x 4 days, then 20mg x 4 days ,then 10mg x 4 days then stop     No current facility-administered medications on file prior to visit.      Patient Active Problem List   Diagnosis     Coccidioidomycosis     Vitamin D Deficiency     Osteoarthritis Of The Knee     Chronic Eosinophilic Pneumonia     Lower abdominal pain     GERD (gastroesophageal reflux disease)     Appendicitis     History   Smoking Status     Never Smoker   Smokeless Tobacco     Never Used     Social History     Social History Narrative     Patient Care Team:  Max Mart MD as PCP - General (Family Medicine)  ROS  Full 10 system review including constitutional, respiratory, cardiac, gi, urinary, rheumatologic, neurologic, reproductive, dermatologic psychiatric is  performed (via questionnaire) and is negative except heartburn/indigestion      Objective  Physical Exam  Vitals:    01/26/17 1416   BP: 112/78   Patient Site: Right Arm   Patient Position: Sitting   Cuff Size: Adult Regular   Pulse: 97   Resp: 16   Temp: 96.9  F (36.1  C)   TempSrc: Oral   SpO2: 98%   Weight: 157 lb 8 oz (71.4 kg)   Height: 5' 3\" (1.6 m)     Alert, oriented, pleasant 56-year-old.  No acute distress  Abdomen appearance is normal and is soft and nontender.  No noted rebound or guarding.  There is no organomegaly or mass noted.  Bowel sounds are normal.    Diagnostics  Vitamin D level pending    Please note: Voice recognition software was used in this dictation.  It may therefore contain typographical errors.    "

## 2021-06-08 NOTE — PROGRESS NOTES
Subjective: This patient comes in for follow-up on his left hand.  He's had swelling through the dorsum of the hand mainly localized at the long finger at the MCP joint.    X-ray was negative he had labs showing CRP CBC uric acid and sed rate all normal.  He was seen at walk-in clinic    He was treated with prednisone 20 mg 2 a day for 5 days.  He was seen on 1/11/17.    The swelling is coming down there is no fever or chills he denies any injury.  There still is some swelling and inflammation though slightly warm.  Uric acid was 5.3    Tobacco status: He  reports that he has never smoked. He has never used smokeless tobacco.    Patient Active Problem List    Diagnosis Date Noted     Appendicitis      Lower abdominal pain 12/20/2016     GERD (gastroesophageal reflux disease)      Coccidioidomycosis      Vitamin D Deficiency      Osteoarthritis Of The Knee      Chronic Eosinophilic Pneumonia        Current Outpatient Prescriptions   Medication Sig Dispense Refill     aspirin 81 MG EC tablet Take 1 tablet (81 mg total) by mouth daily. 90 tablet 3     docusate sodium (COLACE) 100 MG capsule Take 1 capsule (100 mg total) by mouth daily as needed for constipation. 30 capsule 2     ergocalciferol (ERGOCALCIFEROL) 50,000 unit capsule Take 50,000 Units by mouth once a week.       ibuprofen (ADVIL,MOTRIN) 400 MG tablet Take 1 tablet (400 mg total) by mouth every 6 (six) hours as needed for pain. 60 tablet 3     omeprazole (PRILOSEC) 20 MG capsule Take 1 capsule (20 mg total) by mouth daily. 30 capsule 3     polyethylene glycol (GLYCOLAX) 17 gram/dose powder 17 gm in 8 oz water daily 526 g 3     predniSONE (DELTASONE) 10 MG tablet 30mg x 4 days, then 20mg x 4 days ,then 10mg x 4 days then stop 24 tablet 0     No current facility-administered medications for this visit.        ROS:   Review of systems negative other than as outlined above    Objective:    Visit Vitals     /70 (Patient Site: Left Arm, Patient Position:  "Sitting, Cuff Size: Adult Large)     Pulse 80     Temp 97.1  F (36.2  C) (Oral)     Resp 20     Ht 5' 3\" (1.6 m)     Wt 159 lb (72.1 kg)     BMI 28.17 kg/m2     Body mass index is 28.17 kg/(m^2).      General appearance no acute distress    Labwork as outlined below    Vital signs are stable afebrile    Extremities without any swelling distally    He does have a slight redness warmth and tenderness through the MCP joint area of the left middle finger.    By history has come down in swelling and inflammation.    No other joints involved.  Elbows knees hips negative.  Other fingers were normal    Results for orders placed or performed in visit on 01/11/17   Uric Acid   Result Value Ref Range    Uric Acid 5.3 3.0 - 8.0 mg/dL   C-Reactive Protein(CRP)   Result Value Ref Range    CRP 0.1 0.0 - 0.8 mg/dL   Sedimentation Rate   Result Value Ref Range    Sed Rate 5 0 - 15 mm/hr   HM1 (CBC with Diff)   Result Value Ref Range    WBC 5.6 4.0 - 11.0 thou/uL    RBC 4.81 4.40 - 6.20 mill/uL    Hemoglobin 13.8 (L) 14.0 - 18.0 g/dL    Hematocrit 40.6 40.0 - 54.0 %    MCV 84 80 - 100 fL    MCH 28.7 27.0 - 34.0 pg    MCHC 34.0 32.0 - 36.0 g/dL    RDW 12.0 11.0 - 14.5 %    Platelets 125 (L) 140 - 440 thou/uL    MPV 10.5 (H) 7.0 - 10.0 fL    Neutrophils % 66 50 - 70 %    Lymphocytes % 25 20 - 40 %    Monocytes % 4 2 - 10 %    Eosinophils % 5 0 - 6 %    Basophils % 1 0 - 2 %    Neutrophils Absolute 3.7 2.0 - 7.7 thou/uL    Lymphocytes Absolute 1.4 0.8 - 4.4 thou/uL    Monocytes Absolute 0.2 0.0 - 0.9 thou/uL    Eosinophils Absolute 0.3 0.0 - 0.4 thou/uL    Basophils Absolute 0.0 0.0 - 0.2 thou/uL       Assessment:  1. Inflammation of hand joint  predniSONE (DELTASONE) 10 MG tablet   2. Need for immunization against influenza  Influenza, Seasonal,Quad Inj, 36+ MOS     Localized joint swelling question evolving rheumatologic, question gout    We'll have him continue prednisone at 10 mg 3 a day for 4 days 2 a day for 4 days 1 a day for 4 " days    Follow-up if ongoing issues    Plan:  Close monitoring if recurrence or above not resolved    Flu shot    This transcription uses voice recognition software, which may contain typographical errors.

## 2021-06-08 NOTE — TELEPHONE ENCOUNTER
Please contact patient   Vit d rx was refilled for 1 month  But     Patient is due for f/u visit    Please schedule virtual visit   Video preferred sometime this week

## 2021-06-08 NOTE — PROGRESS NOTES
ASSESSMENT:   1. Joint effusion  HM1(CBC and Differential)    Uric Acid    XR Hand Left 3 or More VWS    C-Reactive Protein(CRP)    Sedimentation Rate    HM1 (CBC with Diff)    predniSONE (DELTASONE) 20 MG tablet       Patient presents for unilateral, monoarticular acute arthritis of left long finger MCP joint.  Patient was likely within normal limits upon presentation.  He related the incident to an injury, however with the presentation of a red, warm, fluctuant joint, I think this is less likely related to injury and more likely related to inflammatory arthritis.  I did get an x-ray, which revealed no fractures or dislocations.  I do not suspect tendinous injury as he is moving the finger fairly freely, with active range of motion decreased only secondary to pain.  Strength was intact.    No evidence for septic joint without fevers, leukocytosis, or left shift. CRP also normal, making infection less likely. CRP and sed rate were normal, making this less likely to be an inflammatory arthritis such as RA. Uric acid level was normal, however, gout is still clinically the most likely diagnosis.    Notified patient about remainder of results over the phone.    During visit, we discussed treating with a burst of prednisone as if this were a gout flare, which I feel lis most likely. Patient and daughters understood and agreed. Discussed red flags for immediate return to clinic/ER, as well as indications for follow up if no improvement. Patient understood and agreed to plan. Patient was stable for discharge.        PLAN:  Your x-ray did not show any broken bones in the hand.    Your basic blood counts were all within normal range for you.  There is no evidence of acute, systemic bacterial infection to be causing the joint swelling today.  The remainder of labs will come back later today, and we will call you with results.    Based on your exam, I have high suspicion for gout as the cause for your symptoms.  We will treat  "you with a course of oral steroids for 5 days.  Please take 40 mg of prednisone daily with food.    Do not take any additional ibuprofen or naproxen while on this medication.    May continue to apply ice or heat therapy to the hand as desired.    If persistent problems or no resolution of symptoms, follow up with primary care.  If developing worsening swelling or pain, fevers, or any other new, concerning symptoms, come back to urgent care or go to the ER immediately.          SUBJECTIVE:   Nesha Newton is a 56 y.o. male who presents today for evaluation of left hand swelling for the last day. It began when he was putting on his sweater. His middle finger bent backwards at the MCPJ and he \"heard a pop.\" Since then, the back of the has become swollen and red. It does feel hot when he touches it. It is painful to make a fist. No fevers. No other joint swelling anywhere else in his body. He is right hand dominant.  He took ibuprofen for the pain, which help.    Past Medical History:  Patient Active Problem List   Diagnosis     Coccidioidomycosis     Vitamin D Deficiency     Osteoarthritis Of The Knee     Chronic Eosinophilic Pneumonia     Lower abdominal pain     GERD (gastroesophageal reflux disease)     Appendicitis       Surgical History:  Reviewed; Non-contributory    Family History:  Family History   Problem Relation Age of Onset     Diabetes Mother        Social History:    History   Smoking Status     Never Smoker   Smokeless Tobacco     Never Used     Smoking: none  Alcohol use: none  Other drug use: none  Occupation: PCA      Current Medications:  Current Outpatient Prescriptions on File Prior to Visit   Medication Sig Dispense Refill     aspirin 81 MG EC tablet Take 1 tablet (81 mg total) by mouth daily. 90 tablet 3     docusate sodium (COLACE) 100 MG capsule Take 1 capsule (100 mg total) by mouth daily as needed for constipation. 30 capsule 2     ergocalciferol (ERGOCALCIFEROL) 50,000 unit capsule Take " 50,000 Units by mouth once a week.       ibuprofen (ADVIL,MOTRIN) 400 MG tablet Take 1 tablet (400 mg total) by mouth every 6 (six) hours as needed for pain. 60 tablet 3     omeprazole (PRILOSEC) 20 MG capsule Take 1 capsule (20 mg total) by mouth daily. 30 capsule 3     polyethylene glycol (GLYCOLAX) 17 gram/dose powder 17 gm in 8 oz water daily 526 g 3     No current facility-administered medications on file prior to visit.        Allergies:   Allergies   Allergen Reactions     Tramadol Itching     Pt states itchy all over       I personally reviewed patient's past medical, surgical, social, family history and allergies.    ROS:  Review of Systems  No other joint pains, abdominal pain, rashes, dysuria, fevers.      OBJECTIVE:   Vitals:    01/11/17 1211   BP: 110/70   Pulse: 75   Resp: 16   Temp: 97.5  F (36.4  C)   TempSrc: Oral   SpO2: 97%   Weight: 159 lb 14.4 oz (72.5 kg)       General: Alert older adult in no acute distress.  Appears generally well. Afebrile.  Head: Normocephalic, no signs of trauma.    Neck: Supple  Musculoskeletal: Left hand: radial and ulnar pulses 2+ bilaterally. Edema, erythema, warmth, and fluctuance of left long finger MCP joint. Decreased AROM for flexion and extension of left MCPJ. No other joint effusion. Active flexion of long finger at PIP and DIP.  Strength: Left long finger strength 4-5 at MCP joint for flexion and extension; 5 out of 5 for flexion and extension at DIP and PIP joints.  Remainder of strength of the left hand fingers 5 out of 5.  Skin: Erythema over left long finger MCPJ. No other rashes.  Neuro: Alert and orientated appropriately.  No focal deficits. Sensation intact.           Radiology:  I personally ordered and viewed this study. I agree with below radiology findings.    Xr Hand Left 3 Or More Vws    Result Date: 1/11/2017  XR HAND LEFT 3 OR MORE VWS1/11/2017 12:49 PMINDICATION: long finger MCPJ swelling, pain, poss hyperextension injuryCOMPARISON:  None.FINDINGS: Negative hand. No fracture or dislocation. No foreign material seen.This report was electronically interpreted by: Dr. Noel Bass MD ON 01/11/2017 at 13:04        Laboratory Studies:  I personally ordered and interpreted these studies.    Results for orders placed or performed in visit on 01/11/17   Uric Acid   Result Value Ref Range    Uric Acid 5.3 3.0 - 8.0 mg/dL   C-Reactive Protein(CRP)   Result Value Ref Range    CRP 0.1 0.0 - 0.8 mg/dL   Sedimentation Rate   Result Value Ref Range    Sed Rate 5 0 - 15 mm/hr   HM1 (CBC with Diff)   Result Value Ref Range    WBC 5.6 4.0 - 11.0 thou/uL    RBC 4.81 4.40 - 6.20 mill/uL    Hemoglobin 13.8 (L) 14.0 - 18.0 g/dL    Hematocrit 40.6 40.0 - 54.0 %    MCV 84 80 - 100 fL    MCH 28.7 27.0 - 34.0 pg    MCHC 34.0 32.0 - 36.0 g/dL    RDW 12.0 11.0 - 14.5 %    Platelets 125 (L) 140 - 440 thou/uL    MPV 10.5 (H) 7.0 - 10.0 fL    Neutrophils % 66 50 - 70 %    Lymphocytes % 25 20 - 40 %    Monocytes % 4 2 - 10 %    Eosinophils % 5 0 - 6 %    Basophils % 1 0 - 2 %    Neutrophils Absolute 3.7 2.0 - 7.7 thou/uL    Lymphocytes Absolute 1.4 0.8 - 4.4 thou/uL    Monocytes Absolute 0.2 0.0 - 0.9 thou/uL    Eosinophils Absolute 0.3 0.0 - 0.4 thou/uL    Basophils Absolute 0.0 0.0 - 0.2 thou/uL

## 2021-06-08 NOTE — TELEPHONE ENCOUNTER
RN cannot approve Refill Request    RN can NOT refill this medication med is not covered by policy/route to provider. Last office visit: 10/16/2019 Max Mart MD Last Physical: 11/12/2018 Last MTM visit: Visit date not found Last visit same specialty: 4/9/2020 Raffaele Sarabia MD.  Next visit within 3 mo: Visit date not found  Next physical within 3 mo: Visit date not found      Yarely Maguire, [facility] Triage/Med Refill 5/16/2020    Requested Prescriptions   Pending Prescriptions Disp Refills     VITAMIN D3 50 mcg (2,000 unit) capsule [Pharmacy Med Name: Vitamin D3 2000 UNIT Oral Capsule] 22 capsule 0     Sig: Take 1 capsule by mouth once daily       There is no refill protocol information for this order

## 2021-06-08 NOTE — PROGRESS NOTES
Subjective:  59 y.o. male with concerns of abdominal pain.  It is both sided.  It does not radiate.  It comes and goes.  He believes it is related to constipation.  He has a history of the same.  He is currently using MiraLAX and docusate.  He does not feel that he has a good bowel movement.  A bowel movement improves his pain.  He has taken psyllium in the past.  He thinks this is effective and would like some of that.  Denies recent blood in stool.  No melena.    Additionally a refill of aspirin was requested by him.  He is not entirely sure why he is taking aspirin.  He was not sure why he was taking this medication.  Chart review indicated that it was for polyarthralgias of his hands.  He is also taking ibuprofen.  He noted there may be some issues with stomach problems.  We discussed potentially stopping this medicine (especially if it was for primary coronary artery disease event prevention).  He may trial off and on to see if it makes any difference with his stomach concerns.    Outpatient Medications Prior to Visit   Medication Sig Dispense Refill     acetaminophen (TYLENOL) 500 MG tablet Take 2 tablets (1,000 mg total) by mouth every 6 (six) hours as needed for pain. 100 tablet 3     cholecalciferol, vitamin D3, 2,000 unit Tab 1 po qd 30 tablet 11     docusate sodium (COLACE) 100 MG capsule Take 1 capsule (100 mg total) by mouth 2 (two) times a day. 60 capsule 2     hydroxychloroquine (PLAQUENIL) 200 mg tablet Take 1 tablet by mouth once daily 90 tablet 0     ibuprofen (ADVIL,MOTRIN) 600 MG tablet 1 po three times a day prn pain 60 tablet 1     polyethylene glycol (GLYCOLAX) 17 gram/dose powder Take 17 g by mouth daily. 510 g 11     aspirin 81 MG EC tablet Take 1 tablet (81 mg total) by mouth daily. 90 tablet 1     propranoloL (INDERAL LA) 60 mg 24 hr capsule Take 60 mg by mouth daily.       clotrimazole-betamethasone (LOTRISONE) cream Apply two times a day to neck rash 30 g 0     diclofenac (VOLTAREN) 75  MG EC tablet TAKE 1 TABLET BY MOUTH AT BEDTIME 90 tablet 0     docusate sodium (COLACE) 100 MG capsule Take 1 capsule (100 mg total) by mouth daily as needed for constipation. 30 capsule 2     famotidine (PEPCID) 40 MG tablet Take 1 tablet (40 mg total) by mouth every evening. 30 tablet 6     hydrocortisone-pramoxine (ANALPRAM-HC) 2.5-1 % rectal cream Insert into the rectum 3 (three) times a day. Apply  Externally bid as directed 30 g 1     methylcellulose (CITRUCEL SUGAR FREE) Powd Take 6 g by mouth daily. 540 g 3     pantoprazole (PROTONIX) 40 MG tablet Take 1 tablet (40 mg total) by mouth daily. 30 tablet 1     VERNON' LIQUI-GELS 100 mg capsule TAKE 1 CAPSULE BY MOUTH ONCE DAILY AS NEEDED FOR  CONSTIPATION 30 capsule 6     VITAMIN D3 50 mcg (2,000 unit) capsule Take 1 capsule by mouth once daily 30 capsule 0     No facility-administered medications prior to visit.       Social History     Tobacco Use   Smoking Status Never Smoker   Smokeless Tobacco Never Used      Objective:  /84 (Patient Site: Right Arm, Patient Position: Sitting, Cuff Size: Adult Regular)   Pulse 68   Temp 97.8  F (36.6  C) (Tympanic)   Resp 20   Wt 160 lb (72.6 kg)   BMI 27.46 kg/m    GENERAL: alert, not distressed  CHEST: clear, no rales, rhonchi, or wheezes  CARDIAC: regular without murmur, gallop, or rub  ABDOMEN: soft, non tender, non distended, normal bowel sounds  Mild global tenderness.  No masses palpated.      Assessment and Plan:   1. Constipation, unspecified constipation type  Increase treatment with bulk forming laxative.  He has had success with this in the past.  History is consistent with constipation as a cause for the pain.  If we do not resolve pain with increased treatment we need to follow-up.  History of colonic polyps.  History of hemorrhoids.  - psyllium husk, aspartame, (METAMUCIL SUGAR-FREE, ASPART,) 3.4 gram/5.8 gram Powd; One rounded teaspoon in water up to three times per day as needed for  constipation.  Dispense: 1040 g; Refill: 5    2. Inflammatory polyarthropathy of hand (H)  Discussed potential gastric irritation and ulceration.  Will continue to take this as needed but trial off medicine to see if it is truly improving his pain outcomes.  - aspirin 81 MG EC tablet; Take 1 tablet (81 mg total) by mouth daily.  Dispense: 90 tablet; Refill: 1    3. Intention tremor  Stable with acceptable blood pressure.  Continue beta-blocker.  - propranoloL (INDERAL LA) 60 mg 24 hr capsule; Take 1 capsule (60 mg total) by mouth daily.  Dispense: 90 capsule; Refill: 0    4. Vitamin D deficiency  Continue supplement.  - cholecalciferol, vitamin D3, (VITAMIN D3) 50 mcg (2,000 unit) capsule; Take 1 capsule (2,000 Units total) by mouth daily.  Dispense: 90 capsule; Refill: 0

## 2021-06-08 NOTE — PROGRESS NOTES
Subjective: Patient comes in for evaluation.  He was seen in walk-in clinic a month ago for some inflammation in the hand, he had lab tests with C-reactive protein CBC uric acid and sed rate which were all normal.    He was evaluated on 117 and started on prednisone.  He took that for a short time it did help while he was on it and symptoms seem to have recurred    Is the left middle MCP joint.  Denies any significant pains elsewhere although he's had some degenerative problems with his knees and did have a cortisone injection in the past.    I did check a rheumatoid factor today.    Also needed a tetanus, TD AP, shot today.    Patient denies any additional symptoms he's had no rashes.  He does take omeprazole for his stomach.    Denies any other joints that are swollen.    Tobacco status: He  reports that he has never smoked. He has never used smokeless tobacco.    Patient Active Problem List    Diagnosis Date Noted     Appendicitis      Lower abdominal pain 12/20/2016     GERD (gastroesophageal reflux disease)      Coccidioidomycosis      Vitamin D Deficiency      Osteoarthritis Of The Knee      Chronic Eosinophilic Pneumonia        Current Outpatient Prescriptions   Medication Sig Dispense Refill     aspirin 81 MG EC tablet Take 1 tablet (81 mg total) by mouth daily. 90 tablet 3     cholecalciferol, vitamin D3, 2,000 unit Tab 1 po qd 30 tablet 11     docusate sodium (COLACE) 100 MG capsule Take 1 capsule (100 mg total) by mouth daily as needed for constipation. 30 capsule 2     omeprazole (PRILOSEC) 20 MG capsule Take 2 capsules (40 mg total) by mouth daily. 60 capsule 3     No current facility-administered medications for this visit.        ROS:   Review of systems negative other than his elbow and above    Objective:    Visit Vitals     /72     Pulse 72     Wt 156 lb (70.8 kg)     BMI 27.63 kg/m2     Body mass index is 27.63 kg/(m^2).      General appearance no acute distress    Vital signs were stable  afebrile    He has no breathing issues his heart was regular    His hand with left MTP joint swelling at the joint tenderness not overly red or warm.    Other joints were negative no effusions.    He had had some joint line pain but no effusion.    Skin was normal    No edema and lower extremity.    Previous C-reactive protein uric acid and ESR were all normal    Rheumatoid factor pending from today        Assessment:  1. Inflammation of hand joint  Ambulatory referral to Rheumatology    Rheumatoid Factor Screen   2. Need for Tdap vaccination  Tdap vaccine,  6yo or older,  IM      Patient will hold off on medication I'll have him see rheumatologist.  Question evolving rheumatoid    Td AP shot given today    Plan:  As above    This transcription uses voice recognition software, which may contain typographical errors.

## 2021-06-08 NOTE — PROGRESS NOTES
Subjective: This patient comes in for hospital discharge follow-up.  He was hospitalized 1220 through 12/21/16.    A felt he might have acute appendicitis.  But follow-up evaluations and scans did not reveal this.  It was felt to be ongoing constipation.    Please see previous notes regarding that.  He's now on Colace 100 mg 1 a day as well as omeprazole.  We discussed the omeprazole can be used intermittently now as he doesn't seem to have any significant epigastric symptoms    He does take ibuprofen for some arthritis issues he's had some last arthritis of his knee.    Tobacco status: He  reports that he has never smoked. He has never used smokeless tobacco.    Patient Active Problem List    Diagnosis Date Noted     Appendicitis      Lower abdominal pain 12/20/2016     GERD (gastroesophageal reflux disease)      Coccidioidomycosis      Vitamin D Deficiency      Osteoarthritis Of The Knee      Chronic Eosinophilic Pneumonia        Current Outpatient Prescriptions   Medication Sig Dispense Refill     aspirin 81 MG EC tablet Take 1 tablet (81 mg total) by mouth daily. 90 tablet 3     docusate sodium (COLACE) 100 MG capsule Take 1 capsule (100 mg total) by mouth daily as needed for constipation. 30 capsule 2     ergocalciferol (ERGOCALCIFEROL) 50,000 unit capsule Take 50,000 Units by mouth once a week.       ibuprofen (ADVIL,MOTRIN) 400 MG tablet Take 1 tablet (400 mg total) by mouth every 6 (six) hours as needed for pain. 60 tablet 3     omeprazole (PRILOSEC) 20 MG capsule Take 1 capsule (20 mg total) by mouth daily. 30 capsule 3     polyethylene glycol (GLYCOLAX) 17 gram/dose powder 17 gm in 8 oz water daily 526 g 3     No current facility-administered medications for this visit.        ROS:   Review of systems negative other than as outlined above    Objective:    Visit Vitals     /76 (Patient Site: Right Arm, Patient Position: Sitting, Cuff Size: Adult Large)     Pulse 72     Resp 16     Wt 160 lb (72.6 kg)      BMI 28.34 kg/m2     Body mass index is 28.34 kg/(m^2).      General appearance no acute distress    X-rays and CT scans reviewed from the hospital    HEENT neck negative oropharynx clear    Lungs are clear no rales or rhonchi heart was regular S1-S2    He's afebrile    Abdomen is soft bowel sounds normal no guarding or rebound no right lower quadrant pain at this time back without CVA pain    Extremities without edema he does have some mild joint line tenderness along the medial right knee.  F labs from the hospital showed urine negative CBC acceptable liver functions normal lipase is slightly elevated CRP was negative.    EKG also noted and reviewed.    Results for orders placed or performed during the hospital encounter of 12/20/16   Urinalysis   Result Value Ref Range    Color, UA Yellow Colorless, Yellow, Straw, Light Yellow    Clarity, UA Clear Clear    Glucose, UA Negative Negative    Bilirubin, UA Negative Negative    Ketones, UA Negative Negative, 60 mg/dL    Specific Gravity, UA 1.027 1.001 - 1.030    Blood, UA Negative Negative    pH, UA 6.5 4.5 - 8.0    Protein, UA Negative Negative mg/dL    Urobilinogen, UA <2.0 E.U./dL <2.0 E.U./dL, 2.0 E.U./dL    Nitrite, UA Negative Negative    Leukocytes, UA Negative Negative   HM2 (CBC W/O DIFF)   Result Value Ref Range    WBC 10.8 4.0 - 11.0 thou/uL    RBC 4.66 4.40 - 6.20 mill/uL    Hemoglobin 13.3 (L) 14.0 - 18.0 g/dL    Hematocrit 38.8 (L) 40.0 - 54.0 %    MCV 83 80 - 100 fL    MCH 28.6 27.0 - 34.0 pg    MCHC 34.3 32.0 - 36.0 g/dL    RDW 13.2 11.0 - 14.5 %    Platelets 171 140 - 440 thou/uL    MPV 10.9 (H) 7.0 - 10.0 fL   Basic Metabolic Panel   Result Value Ref Range    Sodium 141 136 - 145 mmol/L    Potassium 4.4 3.5 - 5.0 mmol/L    Chloride 107 98 - 107 mmol/L    CO2 26 22 - 31 mmol/L    Anion Gap, Calculation 8 5 - 18 mmol/L    Glucose 151 (H) 70 - 125 mg/dL    Calcium 9.4 8.5 - 10.5 mg/dL    BUN 15 8 - 22 mg/dL    Creatinine 1.13 0.70 - 1.30 mg/dL     GFR MDRD Af Amer >60 >60 mL/min/1.73m2    GFR MDRD Non Af Amer >60 >60 mL/min/1.73m2   Hepatic Profile   Result Value Ref Range    Bilirubin, Total 0.3 0.0 - 1.0 mg/dL    Bilirubin, Direct <0.1 <=0.5 mg/dL    Protein, Total 7.0 6.0 - 8.0 g/dL    Albumin 4.0 3.5 - 5.0 g/dL    Alkaline Phosphatase 74 45 - 120 U/L    AST 19 0 - 40 U/L    ALT 24 0 - 45 U/L   Lipase   Result Value Ref Range    Lipase 71 (H) 0 - 52 U/L   C-Reactive Protein   Result Value Ref Range    CRP <0.1 0.0 - 0.8 mg/dL   Magnesium   Result Value Ref Range    Magnesium 2.0 1.8 - 2.6 mg/dL   HM1 (CBC with Diff)   Result Value Ref Range    WBC 7.4 4.0 - 11.0 thou/uL    RBC 4.56 4.40 - 6.20 mill/uL    Hemoglobin 12.9 (L) 14.0 - 18.0 g/dL    Hematocrit 37.9 (L) 40.0 - 54.0 %    MCV 83 80 - 100 fL    MCH 28.4 27.0 - 34.0 pg    MCHC 34.1 32.0 - 36.0 g/dL    RDW 13.7 11.0 - 14.5 %    Platelets 152 140 - 440 thou/uL    MPV 11.1 (H) 7.0 - 10.0 fL    Neutrophils % 77 (H) 50 - 70 %    Lymphocytes % 13 (L) 20 - 40 %    Monocytes % 6 2 - 10 %    Eosinophils % 3 0 - 6 %    Basophils % 0 0 - 2 %    Neutrophils Absolute 5.7 2.0 - 7.7 thou/uL    Lymphocytes Absolute 1.0 0.8 - 4.4 thou/uL    Monocytes Absolute 0.5 0.0 - 0.9 thou/uL    Eosinophils Absolute 0.2 0.0 - 0.4 thou/uL    Basophils Absolute 0.0 0.0 - 0.2 thou/uL   ECG 12 lead MUSE   Result Value Ref Range    SYSTOLIC BLOOD PRESSURE  mmHg    DIASTOLIC BLOOD PRESSURE  mmHg    VENTRICULAR RATE 73 BPM    ATRIAL RATE 73 BPM    P-R INTERVAL 182 ms    QRS DURATION 90 ms    Q-T INTERVAL 374 ms    QTC CALCULATION (BEZET) 412 ms    P Axis 30 degrees    R AXIS 56 degrees    T AXIS 38 degrees    MUSE DIAGNOSIS       Normal sinus rhythm  ST elevation, consider early repolarization  Borderline ECG  When compared with ECG of 28-SEP-2015 18:49,  No significant change was found  Confirmed by CHU SEYMOUR MD LOC:[facility] ([id number]) on 12/22/2016 4:26:45 PM         Assessment:  1. Hospital discharge follow-up     2. Constipation   docusate sodium (COLACE) 100 MG capsule    polyethylene glycol (GLYCOLAX) 17 gram/dose powder    3.  Intermittent gastritis.  Use omeprazole intermittently   Abdominal pain resolves, constipation etiology    No appendicitis    Continue good diet with fruit vegetables increase liquids.  Refill on the Colace and MiraLAX also        Plan:  As outlined above    This transcription uses voice recognition software, which may contain typographical errors.

## 2021-06-09 NOTE — PROGRESS NOTES
Subjective:      Patient ID: Nesha Newton is a 56 y.o. male.    Chief Complaint:    HPI  Patient comes in with  for evaluation of swelling to the left third MCP joint.  This is been ongoing for the last 2 months.  He has been seen  for this several times.  He was seen first in the walk-in care clinic and thought that this was a non-gouty arthritis.  He was put on a short burst of prednisone and he seemed to do better.  He then saw his primary doctor and was put on a longer taper prednisone and after that was completed he then was put on indomethacin.  He is finding that the indomethacin is not working.  He does have an appointment early April with rheumatology.    He is not had any fevers.  He is complaining that the pain from the joint is radiating up his arm to his axilla.    Past Medical History:   Diagnosis Date     GERD (gastroesophageal reflux disease)      Vitamin D deficiency        No past surgical history on file.    Family History   Problem Relation Age of Onset     Diabetes Mother        Social History   Substance Use Topics     Smoking status: Never Smoker     Smokeless tobacco: Never Used     Alcohol use No       Review of Systems    Objective:     Visit Vitals     /70     Pulse 74     Temp 98  F (36.7  C) (Oral)     Resp 16     Wt 159 lb 4.8 oz (72.3 kg)     SpO2 96%     BMI 28.22 kg/m2       Physical Exam   Constitutional:   He is well-appearing.   Musculoskeletal:   Left hand: There is swelling and tenderness to the dorsum of the third MCP joint.  There is no redness or increased warmth.  There is no pain with axial compression of the joint.  No significant pain when pressing on the palmar surface of the joint.  He does have pain when squeezing from side to side on the joint.    Left arm: No pain when palpating the hand wrist or arm.  He does state that he has pain radiating from the affected joint up to his axilla.  He is able to lift his arms over his head and I do not  appreciate any lymphadenopathy in the left axilla.   Skin: Skin is warm and dry.       Assessment:     Procedures    The encounter diagnosis was Joint pain.     Pain to the left third metacarpal phalangeal joint that has been ongoing for the last 2 months.  He did not seem to get much relief with indomethacin, but seemed to have better relief with prednisone.  He has had a workup that did not show any abnormalities on an x-ray, CRP was normal and rheumatoid factor was normal.  He is not toxic appearing.    Plan:     I recommended restarting prednisone with a little longer taper.  I encouraged him to keep his appointment rheumatology early next month.  Follow-up if symptoms worsen.    Medications Ordered   Medications     predniSONE (DELTASONE) 10 MG tablet     Sig: 3 tab for 4 days, 2 for 7 days, 1 for 7 days,     Dispense:  20 tablet     Refill:  1

## 2021-06-09 NOTE — PROGRESS NOTES
Subjective: This patient comes in for follow-up.  He has had ongoing swelling and through the left hand at the MCP joint middle finger.    He did improve with prednisone he was on it initially was seen at the walk-in clinic.  Labs at that time were unremarkable  uric acid was 5.3.  I did check rheumatoid factor that was also negative.    Patient continued on longer taper of the prednisone.  She's been off for quite a few weeks though has had some ongoing symptoms.    No new findings or areas involved.    No rashes        Tobacco status: He  reports that he has never smoked. He has never used smokeless tobacco.    Patient Active Problem List    Diagnosis Date Noted     Appendicitis      Lower abdominal pain 12/20/2016     GERD (gastroesophageal reflux disease)      Coccidioidomycosis      Vitamin D Deficiency      Osteoarthritis Of The Knee      Chronic Eosinophilic Pneumonia        Current Outpatient Prescriptions   Medication Sig Dispense Refill     aspirin 81 MG EC tablet Take 1 tablet (81 mg total) by mouth daily. 90 tablet 3     cholecalciferol, vitamin D3, 2,000 unit Tab 1 po qd 30 tablet 11     docusate sodium (COLACE) 100 MG capsule Take 1 capsule (100 mg total) by mouth daily as needed for constipation. 30 capsule 2     omeprazole (PRILOSEC) 20 MG capsule Take 2 capsules (40 mg total) by mouth daily. 60 capsule 3     indomethacin (INDOCIN) 25 MG capsule Take 1 capsule (25 mg total) by mouth 3 (three) times a day with meals. 60 capsule 1     No current facility-administered medications for this visit.        ROS:   Review of systems negative other than as discussed above    Objective:    Visit Vitals     /66 (Patient Site: Right Arm, Patient Position: Sitting, Cuff Size: Adult Regular)     Pulse 84     Resp 12     Wt 157 lb (71.2 kg)     BMI 27.81 kg/m2     Body mass index is 27.81 kg/(m^2).      General appearance no acute distress    Left hand with some synovitis swelling at the MCP joint.  X-ray was  negative regarding bony changes    Wrists were negative elbow negative knees negative    No swelling no warmth    No skin rashes    Heart regular S1-S2.    Lungs clear        Results for orders placed or performed in visit on 02/13/17   Rheumatoid Factor Screen   Result Value Ref Range    Rheumatoid Factor Quantitative <15.0 0 - 30 IU/mL       Assessment:  1. Inflammation of hand joint  indomethacin (INDOCIN) 25 MG capsule     Ongoing inflammation at MCP left middle finger will try Indocin 25 mg 1 3 times a day a given 60 tablets    He has an appointment with rheumatology on 4/5/17    Plan:  As  above    This transcription uses voice recognition software, which may contain typographical errors.

## 2021-06-09 NOTE — PROGRESS NOTES
"Nesha Newton is a 59 y.o. male who is being evaluated via a billable telephone visit.      The patient has been notified of following:     \"This telephone visit will be conducted via a call between you and your physician/provider. We have found that certain health care needs can be provided without the need for a physical exam.  This service lets us provide the care you need with a short phone conversation.  If a prescription is necessary we can send it directly to your pharmacy.  If lab work is needed we can place an order for that and you can then stop by our lab to have the test done at a later time.    Telephone visits are billed at different rates depending on your insurance coverage. During this emergency period, for some insurers they may be billed the same as an in-person visit.  Please reach out to your insurance provider with any questions.    If during the course of the call the physician/provider feels a telephone visit is not appropriate, you will not be charged for this service.\"    Patient has given verbal consent to a Telephone visit? Yes        Additional provider notes:     Subjective:    This was a virtual visit, telephone because of the coronavirus pandemic.    This patient has been seen in the past for abdominal pain.  He has had chronic constipation issues.    He had a CT scan back in December 2019 which was normal except for some hepatic steatosis.    He had a colonoscopy in June 2017 which showed tubular adenoma.    Patient has had some decreased frequency in stooling he does get hemorrhoids.  He does have some cream for that    He saw Dr. Collins on 6/12/2020, his bulk forming agent was increased to twice a day, I believe that is either Citrucel or Metamucil.    Patient's not been on any oral preparations recently so I did prescribe some Docusate 100 mg 1 a day for him    Also discussed getting an sitz bath 15 minutes 3 times a day.    Avoid straining or sitting on the toilet for long " periods of time.    Patient additionally has had reflux and it is unclear if he is taking Protonix and Pepcid he had been on these in the past I did refill those for him he can take both until things settle down and then continue on Protonix.    Otherwise denies additional issues or problems no COVID-19 exposures or symptoms.    Tobacco status: He  reports that he has never smoked. He has never used smokeless tobacco.    Patient Active Problem List    Diagnosis Date Noted     High risk medication use 09/26/2019     Tremor 09/26/2019     Right carpal tunnel syndrome 01/10/2019     Right shoulder tendinitis 06/07/2018     Primary osteoarthritis of left knee 04/03/2018     Primary osteoarthritis of right knee 04/03/2018     Chronic gastritis without bleeding, unspecified gastritis type 04/03/2018     Adenomatous polyp of colon, unspecified part of colon 06/06/2017     Benign neoplasm of transverse colon 06/01/2017     Other hemorrhoids 06/01/2017     Polyp of colon 06/01/2017     Unspecified abdominal pain 06/01/2017     Inflammatory polyarthropathy of hand (H) 05/03/2017     Hepatic steatosis 04/24/2017     Polyarthralgia 04/05/2017     Right upper quadrant abdominal pain 12/20/2016     GERD (gastroesophageal reflux disease)      Vitamin D Deficiency      Osteoarthritis Of The Knee      Chronic Eosinophilic Pneumonia        Current Outpatient Medications   Medication Sig Dispense Refill     acetaminophen (TYLENOL) 500 MG tablet Take 2 tablets (1,000 mg total) by mouth every 6 (six) hours as needed for pain. 100 tablet 3     aspirin 81 MG EC tablet Take 1 tablet (81 mg total) by mouth daily. 90 tablet 1     cholecalciferol, vitamin D3, (VITAMIN D3) 50 mcg (2,000 unit) capsule Take 1 capsule (2,000 Units total) by mouth daily. 90 capsule 0     cholecalciferol, vitamin D3, 2,000 unit Tab 1 po qd 30 tablet 11     clotrimazole-betamethasone (LOTRISONE) cream Apply two times a day to neck rash 30 g 0     diclofenac  (VOLTAREN) 75 MG EC tablet TAKE 1 TABLET BY MOUTH AT BEDTIME 90 tablet 0     docusate sodium (COLACE) 100 MG capsule Take 1 capsule (100 mg total) by mouth daily as needed for constipation. 30 capsule 2     famotidine (PEPCID) 40 MG tablet Take 1 tablet (40 mg total) by mouth every evening. 30 tablet 6     hydrocortisone-pramoxine (ANALPRAM-HC) 2.5-1 % rectal cream Insert into the rectum 3 (three) times a day. Apply  Externally bid as directed 30 g 1     hydroxychloroquine (PLAQUENIL) 200 mg tablet Take 1 tablet by mouth once daily 90 tablet 0     ibuprofen (ADVIL,MOTRIN) 600 MG tablet 1 po three times a day prn pain 60 tablet 1     methylcellulose (CITRUCEL SUGAR FREE) Powd Take 6 g by mouth daily. 540 g 3     pantoprazole (PROTONIX) 40 MG tablet Take 1 tablet (40 mg total) by mouth daily. 30 tablet 6     polyethylene glycol (GLYCOLAX) 17 gram/dose powder Take 17 g by mouth daily. 510 g 11     propranoloL (INDERAL LA) 60 mg 24 hr capsule Take 1 capsule (60 mg total) by mouth daily. 90 capsule 0     psyllium husk, aspartame, (METAMUCIL SUGAR-FREE, ASPART,) 3.4 gram/5.8 gram Powd One rounded teaspoon in water up to three times per day as needed for constipation. 1040 g 5     No current facility-administered medications for this visit.        ROS:   10 point review of systems positive as outlined above otherwise negative    Objective:    There were no vitals taken for this visit.  There is no height or weight on file to calculate BMI.    This was a virtual visit, telephone there was no visual exam.    He denies any abdominal swelling    Denies any rashes.    Lungs: Nonlabored breathing no wheezing.    Heart: No racing of the heart or palpitations.    Skin without jaundice change.    HEENT no scleral icterus by history        Results for orders placed or performed during the hospital encounter of 12/29/19   Urinalysis-UC if Indicated   Result Value Ref Range    Color, UA Colorless Colorless, Yellow, Straw, Light Yellow     Clarity, UA Clear Clear    Glucose, UA Negative Negative    Bilirubin, UA Negative Negative    Ketones, UA Negative Negative, 60 mg/dL    Specific Gravity, UA 1.003 1.001 - 1.030    Blood, UA Negative Negative    pH, UA 6.5 4.5 - 8.0    Protein, UA Negative Negative mg/dL    Urobilinogen, UA <2.0 E.U./dL <2.0 E.U./dL, 2.0 E.U./dL    Nitrite, UA Negative Negative    Leukocytes, UA Negative Negative   Comprehensive Metabolic Panel   Result Value Ref Range    Sodium 142 136 - 145 mmol/L    Potassium 4.3 3.5 - 5.0 mmol/L    Chloride 106 98 - 107 mmol/L    CO2 27 22 - 31 mmol/L    Anion Gap, Calculation 9 5 - 18 mmol/L    Glucose 86 70 - 125 mg/dL    BUN 9 8 - 22 mg/dL    Creatinine 1.11 0.70 - 1.30 mg/dL    GFR MDRD Af Amer >60 >60 mL/min/1.73m2    GFR MDRD Non Af Amer >60 >60 mL/min/1.73m2    Bilirubin, Total 0.5 0.0 - 1.0 mg/dL    Calcium 9.6 8.5 - 10.5 mg/dL    Protein, Total 7.8 6.0 - 8.0 g/dL    Albumin 4.5 3.5 - 5.0 g/dL    Alkaline Phosphatase 79 45 - 120 U/L    AST 19 0 - 40 U/L    ALT 17 0 - 45 U/L   Lipase   Result Value Ref Range    Lipase 50 0 - 52 U/L   HM2(CBC w/o Differential)   Result Value Ref Range    WBC 5.1 4.0 - 11.0 thou/uL    RBC 5.02 4.40 - 6.20 mill/uL    Hemoglobin 14.4 14.0 - 18.0 g/dL    Hematocrit 43.1 40.0 - 54.0 %    MCV 86 80 - 100 fL    MCH 28.7 27.0 - 34.0 pg    MCHC 33.4 32.0 - 36.0 g/dL    RDW 13.2 11.0 - 14.5 %    Platelets 135 (L) 140 - 440 thou/uL       Assessment:  1. Constipation, unspecified constipation type  docusate sodium (COLACE) 100 MG capsule   2. Thrombosed external hemorrhoids     3. Adenomatous polyp of colon, unspecified part of colon     4. Hepatic steatosis     5. Gastroesophageal reflux disease, esophagitis presence not specified     6. Chronic gastritis without bleeding, unspecified gastritis type  famotidine (PEPCID) 40 MG tablet    pantoprazole (PROTONIX) 40 MG tablet     Ongoing abdominal pain likely from his constipation    He has had a recent CT scan of  the abdomen and pelvis and also had a colonoscopy 3 years ago.    Next colonoscopy due June 2022    Please see above regarding diet increasing liquids, increasing fruits and vegetables    Increased fiber as outlined from Dr. Loo and add docusate.    Hemorrhoid treatment as outlined above.    GERD treatment as outlined above    Plan: Follow-up if not improving with these measures otherwise can recheck in 3 to 6 months    This transcription uses voice recognition software, which may contain typographical errors.      Phone call duration: 12 minutes, from 9:17 AM through 9:29 AM    Max Mart MD

## 2021-06-09 NOTE — PROGRESS NOTES
ASSESSMENT AND PLAN:  Nesha Newton 56 y.o. male painful left hand, he has inflammatory arthropathy affecting his left third metacarpophalangeal joint.  Further workup as noted.  We discussed characterization of his inflammatory arthropathy.  Starting him on hydroxychloroquine.  5 mg of prednisone daily in addition.  Pros and cons of prednisone discussed.  Given him the [employer] handout for the  to help him go through that.  I will ask him to return for follow-up here in 1 month.  Diagnoses and all orders for this visit:    Polyarthralgia  -     Uric Acid  -     CCP Antibodies  -     Hepatitis C Antibody (Anti-HCV)  -     hydroxychloroquine (PLAQUENIL) 200 mg tablet; Take 1 tablet (200 mg total) by mouth 2 (two) times a day.  Dispense: 60 tablet; Refill: 6  -     predniSONE (DELTASONE) 2.5 MG tablet; Take 7.5 mg/d prednisone PO for 1 month  Dispense: 90 tablet; Refill: 1    Pain of left hand  -     Uric Acid  -     CCP Antibodies  -     Hepatitis C Antibody (Anti-HCV)  -     hydroxychloroquine (PLAQUENIL) 200 mg tablet; Take 1 tablet (200 mg total) by mouth 2 (two) times a day.  Dispense: 60 tablet; Refill: 6  -     predniSONE (DELTASONE) 2.5 MG tablet; Take 7.5 mg/d prednisone PO for 1 month  Dispense: 90 tablet; Refill: 1    Osteoarthritis Of The Knee      HISTORY OF PRESENTING ILLNESS:  Nesha Newton, 56 y.o., male is here for evaluation of joint pains..  Joints affected include Left hand, it would appear localized to the third metacarpophalangeal joint. This has gone on for 3 months ago. Pain is described as sharp and shooting. It is continuously.  His symptoms are moderate, severe. The symptoms are gradually worsening. Symptoms include pain, tenderness to touch, swelling.  Treatment to date has been with significant relief.  He has reported no history of rash suggest psoriasis.  [place]  Currently he is working as a personal care  assistant.  JAMISON  accompanied by his .  He reported the pain level to be moderately severe to severe in his left hand.  His knees also hurt by what that is mild pain.  He rated pain 7.5/10.  Day-to-day activities are affected because of this.  He noted swelling of the MCP area of the left hand.  He took various nonsteroidals without help.  1 month ago he was given prednisone.  That has helped him very significantly.  There is no family history of rheumatoid arthritis, lupus.  Other  historical information and ADL limitations as noted in the multidimensional health assessment questionnaire attached in the EMR. Rest of the 13 system ROS is negative.     ALLERGIES:Tramadol    PAST MEDICAL/ACTIVE PROBLEMS/MEDICATION/ FAMILY HISTORY/SOCIAL DATA:  The patient has a family history of  Past Medical History:   Diagnosis Date     GERD (gastroesophageal reflux disease)      Vitamin D deficiency      History   Smoking Status     Never Smoker   Smokeless Tobacco     Never Used     Patient Active Problem List   Diagnosis     Coccidioidomycosis     Vitamin D Deficiency     Osteoarthritis Of The Knee     Chronic Eosinophilic Pneumonia     Lower abdominal pain     GERD (gastroesophageal reflux disease)     Appendicitis     Current Outpatient Prescriptions   Medication Sig Dispense Refill     aspirin 81 MG EC tablet Take 1 tablet (81 mg total) by mouth daily. 90 tablet 3     cholecalciferol, vitamin D3, 2,000 unit Tab 1 po qd 30 tablet 11     docusate sodium (COLACE) 100 MG capsule Take 1 capsule (100 mg total) by mouth daily as needed for constipation. 30 capsule 2     omeprazole (PRILOSEC) 20 MG capsule Take 2 capsules (40 mg total) by mouth daily. 60 capsule 3     predniSONE (DELTASONE) 10 MG tablet 3 tab for 4 days, 2 for 7 days, 1 for 7 days, 20 tablet 1     No current facility-administered medications for this visit.        COMPREHENSIVE EXAMINATION:  Vitals:    04/05/17 0812   BP: 98/62   Patient Site: Left Arm   Patient Position:  "Sitting   Cuff Size: Adult Regular   Pulse: 76   Weight: 154 lb 12.8 oz (70.2 kg)   Height: 5' 3\" (1.6 m)     A well appearing alert oriented male. Vital data as noted above. His eyes without inflammation/scleromalacia. ENTwithout oral mucositis, thrush, nasal deformity, external ear redness, deformity. His neck is without lymphadenopathy and supple. Lungs normal sounds, no pleural rub. Heart auscultation normal rate, rhythm; no pericardial rub and murmurs. Abdomen soft, non tender, no organomegaly. Skin examined for heliotrope, malar area eruption, lupus pernio, periungual erythema, sclerodactyly, papules, erythema nodosum, purpura, nail pitting, onycholysis, and obvious psoriasis lesion. Neurological examination shows normal alertness, speech, facial symmetry, tone and power in upper and lower extremities, Tinel's and Phalen's at wrist and gait. The joint examination is performed for swelling, tenderness, warmth, erythema, and range of motion in the following joints: DIPs, PIPs, MCPs, wrists, first CMC's, elbows, shoulders, hips, knees, ankles, feet; spine for range of motion and paraspinal muscles for tenderness. The salient normal / abnormal findings are appended.  He has swelling and tenderness of his third metacarpophalangeal joint of the left hand.  He has JLT of the left knee, with a loss of full flexion.    LAB / IMAGING DATA:  ALT   Date Value Ref Range Status   12/20/2016 24 0 - 45 U/L Final   11/09/2016 27 0 - 45 U/L Final   06/02/2015 52 (H) 0 - 45 U/L Final     Albumin   Date Value Ref Range Status   12/20/2016 4.0 3.5 - 5.0 g/dL Final   11/09/2016 4.2 3.5 - 5.0 g/dL Final   06/02/2015 4.5 3.5 - 5.0 g/dL Final     Creatinine   Date Value Ref Range Status   12/20/2016 1.13 0.70 - 1.30 mg/dL Final   09/28/2015 1.17 0.70 - 1.30 mg/dL Final   06/02/2015 1.21 0.70 - 1.30 mg/dL Final       WBC   Date Value Ref Range Status   01/11/2017 5.6 4.0 - 11.0 thou/uL Final   12/21/2016 7.4 4.0 - 11.0 thou/uL Final "     Hemoglobin   Date Value Ref Range Status   01/11/2017 13.8 (L) 14.0 - 18.0 g/dL Final   12/21/2016 12.9 (L) 14.0 - 18.0 g/dL Final   12/20/2016 13.3 (L) 14.0 - 18.0 g/dL Final     Platelets   Date Value Ref Range Status   01/11/2017 125 (L) 140 - 440 thou/uL Final   12/21/2016 152 140 - 440 thou/uL Final   12/20/2016 171 140 - 440 thou/uL Final       Lab Results   Component Value Date    RF <15.0 02/13/2017    SEDRATE 5 01/11/2017

## 2021-06-10 NOTE — PROGRESS NOTES
ASSESSMENT AND PLAN  Nesha Newton 56 y.o. male painful left hand, he has inflammatory arthropathy affecting his left third metacarpophalangeal joint.  Further workup as noted.  We discussed characterization of his inflammatory arthropathy.  Starting him on hydroxychloroquine.  5 mg of prednisone daily.  Pros and cons of prednisone discussed. .  I will ask him to return for follow-up here in 3 month.  Diagnoses and all orders for this visit:    Pain of left hand  -     predniSONE (DELTASONE) 5 MG tablet; Take 1 tablet (5 mg total) by mouth daily.  Dispense: 90 tablet; Refill: 0    Polyarthralgia  -     predniSONE (DELTASONE) 5 MG tablet; Take 1 tablet (5 mg total) by mouth daily.  Dispense: 90 tablet; Refill: 0    Inflammatory polyarthropathy of hand  -     predniSONE (DELTASONE) 5 MG tablet; Take 1 tablet (5 mg total) by mouth daily.  Dispense: 90 tablet; Refill: 0    HISTORY OF PRESENTING ILLNESS:  Nesha Newton, 56 y.o., male is here for follow up of polyarthritis.   Joints affected include Left hand, it would appear localized to the third metacarpophalangeal joint. This has gone on for 3 months ago. Pain is described as sharp and shooting. It is continuously.  His symptoms are moderate, severe. The symptoms are gradually worsening. Symptoms include pain, tenderness to touch, swelling.  Treatment to date has been with significant relief.  He has reported no history of rash suggest psoriasis.  [place]  Currently he is working as a personal care  assistant.  F accompanied by his .  He reported the pain level to be moderately severe to severe in his left hand.  His knees also hurt by what that is mild pain.  He rated pain 7.5/10.  Day-to-day activities are affected because of this.  He noted swelling of the MCP area of the left hand.  He took various nonsteroidals without help.  1 month ago he was given prednisone.  That has helped him very significantly.  There is no family  history of rheumatoid arthritis, lupus.  Other  historical information and ADL limitations as noted in the multidimensional health assessment questionnaire attached in the EMR.    ALLERGIES:Tramadol    PAST MEDICAL/ACTIVE PROBLEMS/MEDICATION/ FAMILY HISTORY/SOCIAL DATA:  The patient has a family history of  Past Medical History:   Diagnosis Date     GERD (gastroesophageal reflux disease)      Vitamin D deficiency      History   Smoking Status     Never Smoker   Smokeless Tobacco     Never Used     Patient Active Problem List   Diagnosis     Coccidioidomycosis     Vitamin D Deficiency     Osteoarthritis Of The Knee     Chronic Eosinophilic Pneumonia     Right upper quadrant abdominal pain     GERD (gastroesophageal reflux disease)     Pain of left hand     Polyarthralgia     Hepatic steatosis     Current Outpatient Prescriptions   Medication Sig Dispense Refill     aspirin 81 MG EC tablet Take 1 tablet (81 mg total) by mouth daily. 90 tablet 3     cholecalciferol, vitamin D3, 2,000 unit Tab 1 po qd 30 tablet 11     docusate sodium (COLACE) 100 MG capsule Take 1 capsule (100 mg total) by mouth daily as needed for constipation. 30 capsule 2     hydroxychloroquine (PLAQUENIL) 200 mg tablet Take 1 tablet (200 mg total) by mouth 2 (two) times a day. 60 tablet 6     omeprazole (PRILOSEC) 20 MG capsule Take 2 capsules (40 mg total) by mouth daily. 60 capsule 3     predniSONE (DELTASONE) 2.5 MG tablet Take 7.5 mg/d prednisone PO for 1 month 90 tablet 1     No current facility-administered medications for this visit.      DETAILED EXAMINATION  05/03/17  :  Vitals:    05/03/17 1436   BP: 108/68   Pulse: 78   SpO2: 97%     Alert oriented. Head including the face is examined for malar rash, heliotropes, scarring, lupus pernio. Eyes examined for redness such as in episcleritis/scleritis, periorbital lesions.   Neck examined  for lymph nodes, range of motion Both upper and lower extremities (all four) examined for swollen, warm  &/or  tender joints, range of motion, rash, muscle weakness, edema. The salient normal / abnormal findings are appended.  He has reduced swelling and tenderness of his third metacarpophalangeal joint of the left hand.  He has JLT of the left knee, with a loss of full flexion.        LAB / IMAGING DATA:  ALT   Date Value Ref Range Status   04/24/2017 47 (H) 0 - 45 U/L Final   12/20/2016 24 0 - 45 U/L Final   11/09/2016 27 0 - 45 U/L Final     Albumin   Date Value Ref Range Status   04/24/2017 4.0 3.5 - 5.0 g/dL Final   12/20/2016 4.0 3.5 - 5.0 g/dL Final   11/09/2016 4.2 3.5 - 5.0 g/dL Final     Creatinine   Date Value Ref Range Status   04/24/2017 1.04 0.70 - 1.30 mg/dL Final   12/20/2016 1.13 0.70 - 1.30 mg/dL Final   09/28/2015 1.17 0.70 - 1.30 mg/dL Final       WBC   Date Value Ref Range Status   01/11/2017 5.6 4.0 - 11.0 thou/uL Final   12/21/2016 7.4 4.0 - 11.0 thou/uL Final     Hemoglobin   Date Value Ref Range Status   01/11/2017 13.8 (L) 14.0 - 18.0 g/dL Final   12/21/2016 12.9 (L) 14.0 - 18.0 g/dL Final   12/20/2016 13.3 (L) 14.0 - 18.0 g/dL Final     Platelets   Date Value Ref Range Status   01/11/2017 125 (L) 140 - 440 thou/uL Final   12/21/2016 152 140 - 440 thou/uL Final   12/20/2016 171 140 - 440 thou/uL Final       Lab Results   Component Value Date    RF <15.0 02/13/2017    SEDRATE 5 01/11/2017

## 2021-06-10 NOTE — PROGRESS NOTES
Subjective: This patient comes in for follow-up evaluation.  This is a 56-year-old male had elevated liver tests mild with ALT in the 50 range most recently at 47.  Lab tests showed hepatitis A immune hepatitis B immune hepatitis C antibody was negative    Patient had an ultrasound which showed hepatic steatosis diffuse.    Patient's previous triglycerides 507.    I discussed his findings and the need to decrease carbohydrates, he is a non-alcohol drinker.    I did recheck lipids today please see below, triglycerides down to 313    Patient has been on omeprazole does not feel like that has been as effective for him will try some Protonix 20 mg a day.    No additional concerns or issues    Tobacco status: He  reports that he has never smoked. He has never used smokeless tobacco.    Patient Active Problem List    Diagnosis Date Noted     Inflammatory polyarthropathy of hand 05/03/2017     Hepatic steatosis 04/24/2017     Pain of left hand 04/05/2017     Polyarthralgia 04/05/2017     Right upper quadrant abdominal pain 12/20/2016     GERD (gastroesophageal reflux disease)      Coccidioidomycosis      Vitamin D Deficiency      Osteoarthritis Of The Knee      Chronic Eosinophilic Pneumonia        Current Outpatient Prescriptions   Medication Sig Dispense Refill     aspirin 81 MG EC tablet Take 1 tablet (81 mg total) by mouth daily. 90 tablet 3     cholecalciferol, vitamin D3, 2,000 unit Tab 1 po qd 30 tablet 11     docusate sodium (COLACE) 100 MG capsule Take 1 capsule (100 mg total) by mouth daily as needed for constipation. 30 capsule 2     hydroxychloroquine (PLAQUENIL) 200 mg tablet Take 200 mg by mouth 2 (two) times a day.  1     predniSONE (DELTASONE) 5 MG tablet Take 1 tablet (5 mg total) by mouth daily. 90 tablet 0     pantoprazole (PROTONIX) 20 MG tablet Take 1 tablet (20 mg total) by mouth daily. 30 tablet 5     No current facility-administered medications for this visit.        ROS:   10 point review of  systems negative other than as outlined above    Objective:    /70 (Patient Site: Left Arm, Patient Position: Sitting, Cuff Size: Adult Large)  Pulse 72  Resp 14  Wt 155 lb (70.3 kg)  BMI 27.46 kg/m2  Body mass index is 27.46 kg/(m^2).      General appearance no acute distress.    HEENT neck negative oropharynx clear pupils react normally no scleral icterus    Lungs clear no rales or rhonchi heart regular, no murmur    Abdomen nontender, no masses, skin without jaundice change    Results for orders placed or performed in visit on 05/17/17   Lipid [place] FASTING   Result Value Ref Range    Cholesterol 185 <=199 mg/dL    Triglycerides 313 (H) <=149 mg/dL    HDL Cholesterol 39 (L) >=40 mg/dL    LDL Calculated 83 <=129 mg/dL    Patient Fasting > 8hrs? Yes        Assessment:  1. Hepatic steatosis     2. Hypertriglyceridemia  Lipid [place] FASTING   3. Gastritis  pantoprazole (PROTONIX) 20 MG tablet     Hepatic steatosis with hypertriglyceridemia, diet was given foods to use foods to avoid.    Gastritis change over the Protonix    Plan: Follow-up as needed otherwise in 6 months    This transcription uses voice recognition software, which may contain typographical errors.

## 2021-06-10 NOTE — PROGRESS NOTES
Here with the same pain for which he has been seen on several prior occasions.    Recent neg us  Neg studies for hep viral  Steatosis found  hyperlipid found    Two ct abd and pelvis with steatosis  Degenerative endplate changes at L5-S1.    One neg kub    Neg effect of ppi on the pain though the stomach burn is better.    right upper quadrant   Side.  Hard like stone.  Painful burning.  comes and goes.   Last 30-60 minutes.  Today whole day.    Better if walk.  If sit, it is felt.  If walk, is gone after walking 15 minutes.  If sit the pain comes right after sitting down.  No change with twist or turn.   Worse with cough.  No other med trial.    Sees Dr Banks for Inflammatory polyarthropathy of hand.  On for past 3 wks.   Those pains are better.  Is not related to the pain above    Wt stable    At home and cares for father who needs assist of two to transfer    Says the ed doctor told him the appendix was early inflammation and that it might get worse.    ROS: as noted above    OBJECTIVE:   Vitals:    05/25/17 1519   BP: 102/82   Pulse: 76   Resp: 16   Temp: 97.7  F (36.5  C)      Head: atraumatic   Eyes: nl eom, anicteric   Ears: nl external ears   Neck: nl nodes, supple   Lungs: clear to ausc   Heart: regular rhythm  Back: no tenderness  Abd:  tender of muscle just below the lateral rib margin  Joints: uninflamed   Ext: nontender calves   Mental: euthymic  Neuro: no weakness  Gait: normal  ASSESSMENT/PLAN:  Discussion and reassurance  Likely musculoskeletal/ encouraged walking and stretching  Colon cancer screen advised    1. Colon cancer screening  Ambulatory referral for Colonoscopy   2. Right upper quadrant abdominal pain  Ambulatory referral for Colonoscopy     More than 25 of 40 minutes total time spent education counseling regarding the issues and care of same as listed in the assessment and plan of this note

## 2021-06-10 NOTE — PROGRESS NOTES
"Nesha Newton is a 59 y.o. male who is being evaluated via a billable telephone visit.      The patient has been notified of following:     \"This telephone visit will be conducted via a call between you and your physician/provider. We have found that certain health care needs can be provided without the need for a physical exam.  This service lets us provide the care you need with a short phone conversation.  If a prescription is necessary we can send it directly to your pharmacy.  If lab work is needed we can place an order for that and you can then stop by our lab to have the test done at a later time.    Telephone visits are billed at different rates depending on your insurance coverage. During this emergency period, for some insurers they may be billed the same as an in-person visit.  Please reach out to your insurance provider with any questions.    If during the course of the call the physician/provider feels a telephone visit is not appropriate, you will not be charged for this service.\"    Patient has given verbal consent to a Telephone visit? Yes    What phone number would you like to be contacted at? [phone]     Patient would like to receive their AVS by AVS Preference: Mikey.      ASSESSMENT AND PLAN:    Diagnoses and all orders for this visit:    Inflammatory polyarthropathy of hand (H)  -     HM2(CBC w/o Differential); Standing  -     Creatinine; Standing  -     Albumin; Standing  -     ALT (SGPT); Standing  -     hydrOXYchloroQUINE (PLAQUENIL) 200 mg tablet; Take 1 tablet (200 mg total) by mouth daily.  Dispense: 90 tablet; Refill: 0    Polyarthralgia  -     hydrOXYchloroQUINE (PLAQUENIL) 200 mg tablet; Take 1 tablet (200 mg total) by mouth daily.  Dispense: 90 tablet; Refill: 0    High risk medication use    Right shoulder tendinitis          HISTORY OF PRESENTING ILLNESS:  Nesha Newton 59 y.o. is evaluated here via audio link.  He has polyarthralgias in association with inflammatory " joint disease, osteoarthritis, tendinopathy of the shoulders.  He has noted worsening pain.  This is in his shoulder especially the right side.  Interfering with lifting his arm up.  This is gone on for the past several weeks.  Prior to that he was seen in orthopedics walk-in.  He had corticosteroid injections in the knees which have helped.  He has noted no recurrence of pain in his hands wrists.  He goes to the eye doctor regularly.  He wonders if he can go there every 6 months.  He will check with ophthalmology on that.  He has shoulder pain especially in the right side seems to be the worst issue given the background of tendinopathy and option is to consider physical therapy corticosteroid injection.  We will meet here in the next few weeks for that.  ROS enquiry held for fever, ocular symptoms, rash, headache,  GI issues.  Today we also discussed the issues related to the current pandemic, the pros and cons of the current treatment plan, the CDC guidelines such as social distancing washing the hands covering the cough.  ALLERGIES:Tramadol and Primidone    PAST MEDICAL/ACTIVE PROBLEMS/MEDICATION/SOCIAL DATA  Past Medical History:   Diagnosis Date     Chronic gastritis without bleeding, unspecified gastritis type 4/3/2018     Coccidioidomycosis     Created by Conversion      GERD (gastroesophageal reflux disease)      Hepatic steatosis 4/24/2017    HCV neg,      Osteoarthritis Of The Knee     Created by Conversion  Replacement Utility updated for latest IMO load     Polyarthralgia 4/5/2017     Vitamin D deficiency      Social History     Tobacco Use   Smoking Status Never Smoker   Smokeless Tobacco Never Used     Patient Active Problem List   Diagnosis     Vitamin D Deficiency     Osteoarthritis Of The Knee     Chronic Eosinophilic Pneumonia     Right upper quadrant abdominal pain     GERD (gastroesophageal reflux disease)     Polyarthralgia     Hepatic steatosis     Inflammatory polyarthropathy of hand (H)      Adenomatous polyp of colon, unspecified part of colon     Primary osteoarthritis of left knee     Primary osteoarthritis of right knee     Chronic gastritis without bleeding, unspecified gastritis type     Right shoulder tendinitis     Right carpal tunnel syndrome     High risk medication use     Tremor     Benign neoplasm of transverse colon     Other hemorrhoids     Polyp of colon     Unspecified abdominal pain     Current Outpatient Medications   Medication Sig Dispense Refill     acetaminophen (TYLENOL) 500 MG tablet Take 2 tablets (1,000 mg total) by mouth every 6 (six) hours as needed for pain. 100 tablet 3     aspirin 81 MG EC tablet Take 1 tablet (81 mg total) by mouth daily. 90 tablet 1     cholecalciferol, vitamin D3, (VITAMIN D3) 50 mcg (2,000 unit) capsule Take 1 capsule (2,000 Units total) by mouth daily. 90 capsule 0     cholecalciferol, vitamin D3, 2,000 unit Tab 1 po qd 30 tablet 11     CLEARLAX 17 gram/dose powder TAKE 17 GRAMS OF POWDER BY MOUTH ONCE DAILY 510 g 0     clotrimazole-betamethasone (LOTRISONE) cream Apply two times a day to neck rash 30 g 0     diclofenac (VOLTAREN) 75 MG EC tablet TAKE 1 TABLET BY MOUTH AT BEDTIME 90 tablet 0     famotidine (PEPCID) 40 MG tablet Take 1 tablet (40 mg total) by mouth every evening. 30 tablet 6     hydrocortisone-pramoxine (ANALPRAM-HC) 2.5-1 % rectal cream Insert into the rectum 3 (three) times a day. Apply  Externally bid as directed 30 g 1     hydroxychloroquine (PLAQUENIL) 200 mg tablet Take 1 tablet by mouth once daily 90 tablet 0     ibuprofen (ADVIL,MOTRIN) 600 MG tablet 1 po three times a day prn pain 60 tablet 1     methylcellulose (CITRUCEL SUGAR FREE) Powd Take 6 g by mouth daily. 540 g 3     pantoprazole (PROTONIX) 40 MG tablet Take 1 tablet (40 mg total) by mouth daily. 30 tablet 6     propranoloL (INDERAL LA) 60 mg 24 hr capsule Take 1 capsule (60 mg total) by mouth daily. 90 capsule 0     psyllium husk, aspartame, (METAMUCIL SUGAR-FREE,  ASPART,) 3.4 gram/5.8 gram Powd One rounded teaspoon in water up to three times per day as needed for constipation. 1040 g 5     STOOL SOFTENER 100 mg capsule Take 1 capsule by mouth twice daily 60 capsule 0     No current facility-administered medications for this visit.          EXAMINATION: He sounded comfortable, did not sound to be in pain.  It appeared his language was fluent and recall and memory intact as best could be surmised via .  LAB / IMAGING DATA:  ALT   Date Value Ref Range Status   12/29/2019 17 0 - 45 U/L Final   07/29/2019 29 0 - 45 U/L Final   05/05/2019 26 0 - 45 U/L Final     Albumin   Date Value Ref Range Status   12/29/2019 4.5 3.5 - 5.0 g/dL Final   07/29/2019 4.5 3.5 - 5.0 g/dL Final   05/05/2019 4.3 3.5 - 5.0 g/dL Final     Creatinine   Date Value Ref Range Status   12/29/2019 1.11 0.70 - 1.30 mg/dL Final   11/21/2019 1.09 0.70 - 1.30 mg/dL Final   07/29/2019 1.24 0.70 - 1.30 mg/dL Final       WBC   Date Value Ref Range Status   12/29/2019 5.1 4.0 - 11.0 thou/uL Final   11/21/2019 6.3 4.0 - 11.0 thou/uL Final     Hemoglobin   Date Value Ref Range Status   12/29/2019 14.4 14.0 - 18.0 g/dL Final   11/21/2019 13.8 (L) 14.0 - 18.0 g/dL Final   07/29/2019 15.1 14.0 - 18.0 g/dL Final     Platelets   Date Value Ref Range Status   12/29/2019 135 (L) 140 - 440 thou/uL Final     Comment:     Giant platelets present    11/21/2019 176 140 - 440 thou/uL Final   07/29/2019 146 140 - 440 thou/uL Final       Lab Results   Component Value Date    RF <15.0 02/13/2017    SEDRATE 2 04/04/2019     Duration of the call:10  Minutes  Call start: 1016am  Call end:   1026am

## 2021-06-10 NOTE — TELEPHONE ENCOUNTER
Requested Prescriptions     Pending Prescriptions Disp Refills     CLEARLAX 17 gram/dose powder [Pharmacy Med Name:  ClearLax Oral Powder] 510 g 0     Sig: TAKE 17 GRAMS OF POWDER BY MOUTH ONCE DAILY

## 2021-06-10 NOTE — PROGRESS NOTES
[facility] Office Visit    Chief Complaint:  Chief Complaint   Patient presents with     Abdominal Pain     both sides for 3-4 months          Assessment/Plan:  1. Gastroesophageal reflux disease, esophagitis presence not specified  Patient does have a component of reflux and has been taking omeprazole 40 mg daily over-the-counter.  He would like a prescription for this to see if he can get insurance coverage.  He has had not however gone without this medication so this is not likely causing his pain.  - omeprazole (PRILOSEC) 20 MG capsule; Take 2 capsules (40 mg total) by mouth daily.  Dispense: 60 capsule; Refill: 3    2. Hepatic steatosis  Patient is having right upper quadrant pain as outlined below.  We will proceed with blood and ultrasound testing on his liver.  We will check for immune status.  - US Abdomen Limited; Future  - Comprehensive Metabolic Panel  - Hepatitis B Surface Antibody (Anti-HBs) Vaccine Check  - Hepatitis A immune status    3. Right upper quadrant abdominal pain  Possibly related to hepatic steatosis which has been previously seen.  Possibly related to gallbladder issues etc.  Proceed with ultrasound and testing today.  UA is reassuring no evidence of hematuria to suggest a kidney stone.  Pain did start well prior to starting his rheumatologic medications.  - US Abdomen Limited; Future  - Urinalysis-UC if Indicated  - Comprehensive Metabolic Panel    Return if symptoms worsen or fail to improve.    Patient Education/AVS:  There are no Patient Instructions on file for this visit.    HPI:   Nesha Newton is a 56 y.o. male c/o pain on both sides of his abdomen off and on for past several months.  Some days no pain.  Started fall 2016.   Has had constipation in the past at this point he is having soft BM 2x/day with miralax bid and colace daily.  Pain is mostly on the right now. Still taking PPI daily- has to buy OTC omeprazole 40mg (2 20mg tabs daily).  Does not drink  any alcohol.  Will get pain for 60-90 minutes at a time and will come back the next day or maybe up to 10 days later.  Tends to come after a meal.  Better if he does some massage and drinks water. No changes with his bladder.  Never had an ultrasound.     Notes his hand pain is much better with new medicine and stomach pain is not any worse.      History summarized from1-2:working with Rheum- 4/5/17 and recently started on prednisone and plaquenil.    ED visit 12/20/16 for abdominal pain similar to current pain.  He was admitted to evaluate for appendicitis but pain improved and was mostly on the left side.  No elevated WBC. Told that he likely has constipation.   Seen By Dr Mart 11/9/16 for abdominal pain- lab tests were all okay.  Constipation tx with miralax, gerd ppi.    Old Records-1:na  Radiology tests reviewed-1: 11/2016- CT showing hepatic steatosis.    12/2016 CT showing possible enhancement of the appendix, otherwise normal.  Hepatic steatosis.    Lab tests reviewed-1: Lipase elevated 68  Medicine tests reviewed-1: na    Physical Exam:  /68 (Patient Site: Right Arm, Patient Position: Sitting, Cuff Size: Adult Regular)  Pulse 84  Resp 20  Wt 154 lb (69.9 kg)  BMI 27.28 kg/m2 Body mass index is 27.28 kg/(m^2). No LMP for male patient.  Vital signs reviewed  Wt Readings from Last 3 Encounters:   04/24/17 154 lb (69.9 kg)   04/05/17 154 lb 12.8 oz (70.2 kg)   03/16/17 159 lb 4.8 oz (72.3 kg)     History   Smoking Status     Never Smoker   Smokeless Tobacco     Never Used     History   Sexual Activity     Sexual activity: Not on file     No Data Recorded  No Data Recorded  PHQ-2 Total Score: 0 (1/4/2017  9:00 AM)  No Data Recorded    All normal as below except abnormalities include: Pain shows tenderness in his right upper quadrant.  He does have some pain on deep palpation over the liver worse with deep breath in.  There is no CVA or flank tenderness noted.  No significant epigastric pain or other  abdominal pain noted.  Remainder of exam is normal.  General is a  56 y.o. male sitting comfortably in no apparent distress.   Neck: Supple without lymphadenopathy or thyromegally  CV: Regular rate and rhythm S1S2 without rubs, murmurs or gallops,   Lungs: Clear to auscultation bilaterally  Abd:  +BS, soft ND,  No masses or organomegally  Extremities: Warm, No Edema, 2+ Pedal and radial pulses bilaterally  Skin: No lesions or rashes noted  Neuro/MSK: Able to ambulate around the exam room with equal movement, strength and normal coordination of the upper and lower extremeties symmetrically    Results for orders placed or performed in visit on 04/24/17   Urinalysis-UC if Indicated   Result Value Ref Range    Color, UA Yellow Colorless, Yellow, Straw, Light Yellow    Clarity, UA Clear Clear    Glucose, UA Negative Negative    Bilirubin, UA Negative Negative    Ketones, UA Negative Negative    Specific Gravity, UA 1.020 1.005 - 1.030    Blood, UA Negative Negative    pH, UA 6.0 5.0 - 8.0    Protein, UA Negative Negative mg/dL    Urobilinogen, UA 0.2 E.U./dL 0.2 E.U./dL, 1.0 E.U./dL    Nitrite, UA Negative Negative    Leukocytes, UA Negative Negative   Comprehensive Metabolic Panel   Result Value Ref Range    Sodium 140 136 - 145 mmol/L    Potassium 4.4 3.5 - 5.0 mmol/L    Chloride 103 98 - 107 mmol/L    CO2 29 22 - 31 mmol/L    Anion Gap, Calculation 8 5 - 18 mmol/L    Glucose 84 70 - 125 mg/dL    BUN 10 8 - 22 mg/dL    Creatinine 1.04 0.70 - 1.30 mg/dL    GFR MDRD Af Amer >60 >60 mL/min/1.73m2    GFR MDRD Non Af Amer >60 >60 mL/min/1.73m2    Bilirubin, Total 0.3 0.0 - 1.0 mg/dL    Calcium 9.7 8.5 - 10.5 mg/dL    Protein, Total 7.2 6.0 - 8.0 g/dL    Albumin 4.0 3.5 - 5.0 g/dL    Alkaline Phosphatase 66 45 - 120 U/L    AST 21 0 - 40 U/L    ALT 47 (H) 0 - 45 U/L   Hepatitis B Surface Antibody (Anti-HBs) Vaccine Check   Result Value Ref Range    HBV Surface Ab (Vaccine Check) Positive Positive       ROS:  10 point review  of symptoms all negative except as outlined in the HPI above.    Med list and active problem list reviewed and updated as part of this encounter    Current Outpatient Prescriptions on File Prior to Visit   Medication Sig Dispense Refill     aspirin 81 MG EC tablet Take 1 tablet (81 mg total) by mouth daily. 90 tablet 3     cholecalciferol, vitamin D3, 2,000 unit Tab 1 po qd 30 tablet 11     docusate sodium (COLACE) 100 MG capsule Take 1 capsule (100 mg total) by mouth daily as needed for constipation. 30 capsule 2     hydroxychloroquine (PLAQUENIL) 200 mg tablet Take 1 tablet (200 mg total) by mouth 2 (two) times a day. 60 tablet 6     predniSONE (DELTASONE) 2.5 MG tablet Take 7.5 mg/d prednisone PO for 1 month 90 tablet 1     No current facility-administered medications on file prior to visit.          Radha Tavarez MD    This document was created using voice recognition software which may contain typographical errors.

## 2021-06-11 NOTE — TELEPHONE ENCOUNTER
Patient has a future Telephone Visit appointment on 10/28/2020 @ 11:00AM with . Completing task.

## 2021-06-11 NOTE — PROGRESS NOTES
"Nesha Newton is a 59 y.o. male who is being evaluated via a billable telephone visit.      The patient has been notified of following:     \"This telephone visit will be conducted via a call between you and your physician/provider. We have found that certain health care needs can be provided without the need for a physical exam.  This service lets us provide the care you need with a short phone conversation.  If a prescription is necessary we can send it directly to your pharmacy.  If lab work is needed we can place an order for that and you can then stop by our lab to have the test done at a later time.    Telephone visits are billed at different rates depending on your insurance coverage. During this emergency period, for some insurers they may be billed the same as an in-person visit.  Please reach out to your insurance provider with any questions.    If during the course of the call the physician/provider feels a telephone visit is not appropriate, you will not be charged for this service.\"    Patient has given verbal consent to a Telephone visit? Yes    What phone number would you like to be contacted at? [phone]     Patient would like to receive their AVS by AVS Preference: Mail a copy.    Additional provider notes:     Subjective: This patient had a virtual visit, telephone, due to the coronavirus pandemic.    This patient has multiple medical conditions.  He needed refill on all of his medicines he has been having a difficult time getting them at the pharmacy    Patient has a history of GI issues with constipation and reflux.    He more recently had another evaluation back on 8/5/2020 was in the emergency room.  Had a CT of the abdomen and pelvis which showed constipation only.  Lab work showed normal CM P except the ALT was 58.    He does have a history of hepatic steatosis in the past.    Normal CBC    He sees Dr. Banks for his inflammatory polyarthropathy takes hydroxychloroquine.    Patient " continues on Protonix and Pepcid for his reflux that is controlling things    For constipation he has had some hemorrhoids we have discussed increasing fruits and vegetable in the diet but also staying on Dukas 800 mg a day and MiraLAX 17 g in 8 ounces of water twice daily.    A refill on moles.    He had a colon polyp in the past he is due again for colonoscopy in 2022.    No additional concern or issue.  He continues on his vitamin D replacement as well.    His tremor is unchanged he takes propranolol.    Denies any COVID-19 symptoms or exposure    Tobacco status: He  reports that he has never smoked. He has never used smokeless tobacco.    Patient Active Problem List    Diagnosis Date Noted     Constipation, unspecified constipation type 09/16/2020     High risk medication use 09/26/2019     Tremor 09/26/2019     Right carpal tunnel syndrome 01/10/2019     Right shoulder tendinitis 06/07/2018     Primary osteoarthritis of left knee 04/03/2018     Primary osteoarthritis of right knee 04/03/2018     Chronic gastritis without bleeding, unspecified gastritis type 04/03/2018     Adenomatous polyp of colon, unspecified part of colon 06/06/2017     Benign neoplasm of transverse colon 06/01/2017     Other hemorrhoids 06/01/2017     Polyp of colon 06/01/2017     Unspecified abdominal pain 06/01/2017     Inflammatory polyarthropathy of hand (H) 05/03/2017     Hepatic steatosis 04/24/2017     Polyarthralgia 04/05/2017     Right upper quadrant abdominal pain 12/20/2016     GERD (gastroesophageal reflux disease)      Vitamin D Deficiency      Osteoarthritis Of The Knee      Chronic Eosinophilic Pneumonia        Current Outpatient Medications   Medication Sig Dispense Refill     acetaminophen (TYLENOL) 500 MG tablet Take 2 tablets (1,000 mg total) by mouth every 6 (six) hours as needed for pain. 100 tablet 3     aspirin 81 MG EC tablet Take 1 tablet (81 mg total) by mouth daily. 90 tablet 1     cholecalciferol, vitamin D3,  (VITAMIN D3) 50 mcg (2,000 unit) capsule Take 1 capsule (2,000 Units total) by mouth daily. 30 capsule 3     clotrimazole-betamethasone (LOTRISONE) cream Apply two times a day to neck rash 30 g 0     docusate sodium (COLACE) 100 MG capsule 1 po daily for constipation 30 capsule 3     famotidine (PEPCID) 40 MG tablet Take 1 tablet (40 mg total) by mouth every evening. 30 tablet 3     hydrocortisone-pramoxine (ANALPRAM-HC) 2.5-1 % rectal cream Insert into the rectum 3 (three) times a day. Apply  Externally bid as directed 30 g 1     hydrOXYchloroQUINE (PLAQUENIL) 200 mg tablet Take 1 tablet (200 mg total) by mouth daily. 90 tablet 0     pantoprazole (PROTONIX) 40 MG tablet Take 1 tablet (40 mg total) by mouth daily. 30 tablet 3     polyethylene glycol (GLYCOLAX) 17 gram/dose powder Take 17 g by mouth 2 (two) times a day. 765 g 3     propranoloL (INDERAL LA) 60 mg 24 hr capsule Take 1 capsule (60 mg total) by mouth daily. 30 capsule 3     No current facility-administered medications for this visit.        ROS:   10 point review of systems positive as outlined above otherwise negative    Objective:    There were no vitals taken for this visit.  There is no height or weight on file to calculate BMI.      General: No acute distress    HEENT he denies any sore throat or cough or congestion through the nose    Lungs: Nonlabored breathing no wheezing.    Heart: No palpitations or rapid heart rate    Abdomen nondistended denies any palpable abnormality.  Denies any pain at this time    Extremities without edema    Skin is normal no rashes.    Review of labs from the hospital and CT scan as outlined above.    Neurologic he says his tremors about the same    Results for orders placed or performed during the hospital encounter of 08/05/20   Urinalysis-UC if Indicated   Result Value Ref Range    Color, UA Yellow Colorless, Yellow, Straw, Light Yellow    Clarity, UA Clear Clear    Glucose, UA Negative Negative    Bilirubin, UA  Negative Negative    Ketones, UA Negative Negative, 60 mg/dL    Specific Gravity, UA 1.004 1.001 - 1.030    Blood, UA Negative Negative    pH, UA 5.5 4.5 - 8.0    Protein, UA Negative Negative mg/dL    Urobilinogen, UA <2.0 E.U./dL <2.0 E.U./dL, 2.0 E.U./dL    Nitrite, UA Negative Negative    Leukocytes, UA Negative Negative   Comprehensive metabolic panel   Result Value Ref Range    Sodium 141 136 - 145 mmol/L    Potassium 5.0 3.5 - 5.0 mmol/L    Chloride 106 98 - 107 mmol/L    CO2 27 22 - 31 mmol/L    Anion Gap, Calculation 8 5 - 18 mmol/L    Glucose 122 70 - 125 mg/dL    BUN 9 8 - 22 mg/dL    Creatinine 1.04 0.70 - 1.30 mg/dL    GFR MDRD Af Amer >60 >60 mL/min/1.73m2    GFR MDRD Non Af Amer >60 >60 mL/min/1.73m2    Bilirubin, Total 0.4 0.0 - 1.0 mg/dL    Calcium 9.6 8.5 - 10.5 mg/dL    Protein, Total 8.0 6.0 - 8.0 g/dL    Albumin 4.4 3.5 - 5.0 g/dL    Alkaline Phosphatase 83 45 - 120 U/L    AST 25 0 - 40 U/L    ALT 58 (H) 0 - 45 U/L   HM1 (CBC with Diff)   Result Value Ref Range    WBC 6.4 4.0 - 11.0 thou/uL    RBC 5.01 4.40 - 6.20 mill/uL    Hemoglobin 14.8 14.0 - 18.0 g/dL    Hematocrit 44.4 40.0 - 54.0 %    MCV 89 80 - 100 fL    MCH 29.5 27.0 - 34.0 pg    MCHC 33.3 32.0 - 36.0 g/dL    RDW 13.9 11.0 - 14.5 %    Platelets 172 140 - 440 thou/uL    MPV 13.5 (H) 8.5 - 12.5 fL    Neutrophils % 71 (H) 50 - 70 %    Lymphocytes % 18 (L) 20 - 40 %    Monocytes % 6 2 - 10 %    Eosinophils % 3 0 - 6 %    Basophils % 1 0 - 2 %    Neutrophils Absolute 4.6 2.0 - 7.7 thou/uL    Lymphocytes Absolute 1.2 0.8 - 4.4 thou/uL    Monocytes Absolute 0.4 0.0 - 0.9 thou/uL    Eosinophils Absolute 0.2 0.0 - 0.4 thou/uL    Basophils Absolute 0.1 0.0 - 0.2 thou/uL       Assessment:  1. Constipation, unspecified constipation type     2. Adenomatous polyp of colon, unspecified part of colon     3. Hepatic steatosis     4. Gastroesophageal reflux disease, esophagitis presence not specified     5. Vitamin D deficiency  cholecalciferol,  vitamin D3, (VITAMIN D3) 50 mcg (2,000 unit) capsule   6. Slow transit constipation  docusate sodium (COLACE) 100 MG capsule    polyethylene glycol (GLYCOLAX) 17 gram/dose powder   7. Chronic gastritis without bleeding, unspecified gastritis type  pantoprazole (PROTONIX) 40 MG tablet    famotidine (PEPCID) 40 MG tablet   8. Inflammatory polyarthropathy of hand (H)  aspirin 81 MG EC tablet   9. Intention tremor  propranoloL (INDERAL LA) 60 mg 24 hr capsule     Constipation please see above discussion regarding diet fluids MiraLAX and Docosavit.    Continue propranolol for tremor    Continue Pepcid and Protonix for reflux.    Vitamin D supplementation.    Continue to follow with rheumatology, I believe he is on the hydroxychloroquine he does get labs periodically for screening because of that.    Plan: As discussed above plan to recheck 3 months earlier as needed    This transcription uses voice recognition software, which may contain typographical errors.      Phone call duration: 13 minutes, from 10:21 AM through 10:34 AM    Max Mart MD

## 2021-06-11 NOTE — PROGRESS NOTES
Subjective: This patient comes in for evaluation he is a 56-year-old male he had a colonoscopy 6/1/2017.  They found an adenomatous polyp.    Patient will recheck in 5 years with colonoscopy    He does have some constipation needed refill on Colace, that we also discussed diet with increase liquids fruits vegetables.    Patient also wanted some prednisone refilled he does have a refill I looked at his prescription and he gets the prednisone from Dr. Banks for his polyarthropathy of the hand.    He will plan to see Dr. Banks in the next couple months also.    No additional concerns or issues    Tobacco status: He  reports that he has never smoked. He has never used smokeless tobacco.    Patient Active Problem List    Diagnosis Date Noted     Adenomatous colon polyp 06/06/2017     Inflammatory polyarthropathy of hand 05/03/2017     Hepatic steatosis 04/24/2017     Pain of left hand 04/05/2017     Polyarthralgia 04/05/2017     Right upper quadrant abdominal pain 12/20/2016     GERD (gastroesophageal reflux disease)      Coccidioidomycosis      Vitamin D Deficiency      Osteoarthritis Of The Knee      Chronic Eosinophilic Pneumonia        Current Outpatient Prescriptions   Medication Sig Dispense Refill     aspirin 81 MG EC tablet Take 1 tablet (81 mg total) by mouth daily. 90 tablet 3     cholecalciferol, vitamin D3, 2,000 unit Tab 1 po qd 30 tablet 11     docusate sodium (COLACE) 100 MG capsule Take 1 capsule (100 mg total) by mouth daily as needed for constipation. 30 capsule 6     hydroxychloroquine (PLAQUENIL) 200 mg tablet Take 200 mg by mouth 2 (two) times a day.  1     pantoprazole (PROTONIX) 20 MG tablet Take 1 tablet (20 mg total) by mouth daily. 30 tablet 5     predniSONE (DELTASONE) 5 MG tablet Take 1 tablet (5 mg total) by mouth daily. 90 tablet 0     No current facility-administered medications for this visit.        ROS:    review of systems negative other than as outlined above    Objective:    BP  "104/70 (Patient Site: Left Arm, Patient Position: Sitting, Cuff Size: Adult Regular)  Pulse 88  Temp 98  F (36.7  C) (Oral)   Resp 20  Ht 5' 7\" (1.702 m)  Wt 155 lb (70.3 kg)  BMI 24.28 kg/m2  Body mass index is 24.28 kg/(m^2).      General appearance no acute distress    HEENT neck is negative oropharynx clear    Lungs are clear no rales or rhonchi, heart is regular S1-S2    Abdomen soft nontender no masses or guarding        Assessment:  1. Adenomatous colon polyp      colonoscopy 6/2017  recheck in 5 years   2. Inflammatory polyarthropathy of hand     3. Constipation  docusate sodium (COLACE) 100 MG capsule     Adenomatous colon polyp recheck in 5 years    Continue on prednisone follow-up with rheumatology    Dietary issues and Colace for the constipation discussed.    Plan: As outlined above    This transcription uses voice recognition software, which may contain typographical errors.    "

## 2021-06-11 NOTE — TELEPHONE ENCOUNTER
Patient is due for follow-up virtual visit with Dr. Mart in a couple weeks, please schedule, video preferred     Needs follow-up on meds and may need labs after (vitamin D, etc.).    Let him know I refilled 1 month supply of the vitamin D

## 2021-06-11 NOTE — TELEPHONE ENCOUNTER
Unable to LM at [phone] due to phone line is busy . Sending letter out and postponing task out to 2 weeks and will try again if an appointment hasn't been made.

## 2021-06-11 NOTE — TELEPHONE ENCOUNTER
"Daughter Michelle is calling and states that Nesha is having chest pain.  Present for two days.  Pain comes and goes.  Pain is currently \"sharp\".  Denies sweat visible on face.      COVID 19 Nurse Triage Plan/Patient Instructions    Please be aware that novel coronavirus (COVID-19) may be circulating in the community. If you develop symptoms such as fever, cough, or SOB or if you have concerns about the presence of another infection including coronavirus (COVID-19), please contact your health care provider or visit [email or web address].     Disposition/Instructions    ED Visit recommended. Follow protocol based instructions.      Bring Your Own Device:  Please also bring your smart device(s) (smart phones, tablets, laptops) and their charging cables for your personal use and to communicate with your care team during your visit.      Thank you for taking steps to prevent the spread of this virus.  o Limit your contact with others.  o Wear a simple mask to cover your cough.  o Wash your hands well and often.    Resources    [facility]: About COVID-19: [email or web address]    CDC: What to Do If You're Sick: www.cdc.gov/coronavirus/2019-ncov/about/steps-when-sick.html    CDC: Ending Home Isolation: www.cdc.gov/coronavirus/2019-ncov/hcp/disposition-in-home-patients.html     CDC: Caring for Someone: www.cdc.gov/coronavirus/2019-ncov/if-you-are-sick/care-for-someone.html     [facility]: Interim Guidance for Hospital Discharge to Home: www.[facility]/diseases/coronavirus/hcp/hospdischarge.pdf    [facility] clinical trials (COVID-19 research studies): [email or web address]     Below are the COVID-19 hotlines at the [facility] ([facility]). Interpreters are available.   o For health questions: Call 667-363-2774 or 1-409.978.9454 (7 a.m. to 7 p.m.)  o For questions about schools and childcare: Call 136-084-8597 or 1-868.411.4335 (7 a.m. to 7 p.m.)       Reason for " "Disposition    Chest pain lasts > 5 minutes (Exceptions: chest pain occurring > 3 days ago and now asymptomatic; same as previously diagnosed heartburn and has accompanying sour taste in mouth)    Additional Information    Negative: Severe difficulty breathing (e.g., struggling for each breath, speaks in single words)    Negative: Difficult to awaken or acting confused (e.g., disoriented, slurred speech)    Negative: Shock suspected (e.g., cold/pale/clammy skin, too weak to stand, low BP, rapid pulse)    Negative: [1] Chest pain lasts > 5 minutes AND [2] history of heart disease  (i.e., heart attack, bypass surgery, angina, angioplasty, CHF; not just a heart murmur)    Negative: [1] Chest pain lasts > 5 minutes AND [2] described as crushing, pressure-like, or heavy    Negative: [1] Chest pain lasts > 5 minutes AND [2] age > 50    Negative: [1] Chest pain lasts > 5 minutes AND [2] age > 30 AND [3] at least one cardiac risk factor (i.e., hypertension, diabetes, obesity, smoker or strong family history of heart disease)    Negative: [1] Chest pain lasts > 5 minutes AND [2] not relieved with nitroglycerin    Negative: Passed out (i.e., lost consciousness, collapsed and was not responding)    Negative: Heart beating < 50 beats per minute OR > 140 beats per minute    Negative: Visible sweat on face or sweat dripping down face    Negative: Sounds like a life-threatening emergency to the triager    Negative: SEVERE chest pain    Negative: [1] Intermittent  chest pain or \"angina\" AND [2] increasing in severity or frequency  (Exception: pains lasting a few seconds)    Negative: Pain also present in shoulder(s) or arm(s) or jaw  (Exception: pain is clearly made worse by movement)    Negative: Difficulty breathing    Negative: Dizziness or lightheadedness    Negative: Coughing up blood    Negative: Cocaine use within last 3 days    Negative: History of prior \"blood clot\" in leg or lungs (i.e., deep vein thrombosis, pulmonary " embolism)    Negative: Recent illness requiring prolonged bedrest (i.e., immobilization)    Negative: Hip or leg fracture in past 2 months (e.g., had cast on leg or ankle)    Negative: Major surgery in the past month    Negative: Recent long-distance travel with prolonged time in car, bus, plane, or train (i.e., within past 2 weeks; 6 or  more hours duration)    Protocols used: CHEST PAIN-A-AH

## 2021-06-12 NOTE — PROGRESS NOTES
"Nesha Newton is a 59 y.o. male who is being evaluated via a billable video visit.      The patient has been notified of following:     \"This video visit will be conducted via a call between you and your physician/provider. We have found that certain health care needs can be provided without the need for an in-person physical exam.  This service lets us provide the care you need with a video conversation.  If a prescription is necessary we can send it directly to your pharmacy.  If lab work is needed we can place an order for that and you can then stop by our lab to have the test done at a later time.    Video visits are billed at different rates depending on your insurance coverage. Please reach out to your insurance provider with any questions.    If during the course of the call the physician/provider feels a video visit is not appropriate, you will not be charged for this service.\"    Patient has given verbal consent to a Video visit? Yes  How would you like to obtain your AVS? AVS Preference: Mail a copy.  If dropped by the video visit, the video invitation should be sent to: Text to cell phone: [phone]   Will anyone else be joining your video visit? No         Video Start Time: 10:50 am    Additional provider notes:     Subjective: This patient had a virtual visit, video, due to the coronavirus pandemic.    Patient has been on both Pepcid 40 mg a day and pantoprazole 40 mg a day for gastritis.    They are not covering his PPI any further.    I think at this point is probably okay to stop that and just use the Pepcid daily    Consider adding Carafate if needed.  Also consider checking H. pylori if symptoms recur he seems to be stable.    Is not had an EGD.  May need that if symptoms recur.    He does take ibuprofen for his inflammatory polyarthropathy of his hand.  He takes it as needed.  I discussed taking it with food.    His constipation was evaluated back in September he is doing better now that he " is on the MiraLAX and the docusate and diet has improved.    No additional concern or issue at this time.    We did discuss good diet regarding his hepatic steatosis as well    Would like to see him back for recheck in 3 months.    Patient also needs a flu shot and will come in for that    No COVID-19 symptoms or exposure    Tobacco status: He  reports that he has never smoked. He has never used smokeless tobacco.    Patient Active Problem List    Diagnosis Date Noted     Constipation, unspecified constipation type 09/16/2020     High risk medication use 09/26/2019     Tremor 09/26/2019     Right carpal tunnel syndrome 01/10/2019     Right shoulder tendinitis 06/07/2018     Primary osteoarthritis of left knee 04/03/2018     Primary osteoarthritis of right knee 04/03/2018     Chronic gastritis without bleeding, unspecified gastritis type 04/03/2018     Adenomatous polyp of colon, unspecified part of colon 06/06/2017     Benign neoplasm of transverse colon 06/01/2017     Other hemorrhoids 06/01/2017     Polyp of colon 06/01/2017     Unspecified abdominal pain 06/01/2017     Inflammatory polyarthropathy of hand (H) 05/03/2017     Hepatic steatosis 04/24/2017     Polyarthralgia 04/05/2017     Right upper quadrant abdominal pain 12/20/2016     GERD (gastroesophageal reflux disease)      Vitamin D Deficiency      Osteoarthritis Of The Knee      Chronic Eosinophilic Pneumonia        Current Outpatient Medications   Medication Sig Dispense Refill     acetaminophen (TYLENOL) 500 MG tablet Take 2 tablets (1,000 mg total) by mouth every 6 (six) hours as needed for pain. 100 tablet 1     aspirin 81 MG EC tablet Take 1 tablet (81 mg total) by mouth daily. 90 tablet 1     cholecalciferol, vitamin D3, (VITAMIN D3) 50 mcg (2,000 unit) capsule Take 1 capsule (2,000 Units total) by mouth daily. 30 capsule 3     docusate sodium (COLACE) 100 MG capsule 1 po daily for constipation 30 capsule 3     famotidine (PEPCID) 40 MG tablet Take 1  tablet (40 mg total) by mouth every evening. 30 tablet 3     hydrocortisone-pramoxine (ANALPRAM-HC) 2.5-1 % rectal cream Insert into the rectum 3 (three) times a day. Apply  Externally bid as directed 30 g 1     hydrOXYchloroQUINE (PLAQUENIL) 200 mg tablet Take 1 tablet (200 mg total) by mouth daily. 90 tablet 0     polyethylene glycol (GLYCOLAX) 17 gram/dose powder Take 17 g by mouth 2 (two) times a day. 765 g 3     propranoloL (INDERAL LA) 60 mg 24 hr capsule Take 1 capsule (60 mg total) by mouth daily. 30 capsule 3     No current facility-administered medications for this visit.        ROS:   10 point review of systems positive as outlined above otherwise negative    Objective:    There were no vitals taken for this visit.  There is no height or weight on file to calculate BMI.      General appearance: No acute distress he was present as well as his daughter who spoke English.    HEENT no nasal congestion no sore throat    Lungs are clear nonlabored breathing no wheezing.    Heart: No palpitations rapid heart rate    Denies any epigastric pain or abdominal pain no bloating    No joint effusion or synovitis at this time    No peripheral edema    Skin is normal no rashes    Results for orders placed or performed during the hospital encounter of 08/05/20   Urinalysis-UC if Indicated   Result Value Ref Range    Color, UA Yellow Colorless, Yellow, Straw, Light Yellow    Clarity, UA Clear Clear    Glucose, UA Negative Negative    Bilirubin, UA Negative Negative    Ketones, UA Negative Negative, 60 mg/dL    Specific Gravity, UA 1.004 1.001 - 1.030    Blood, UA Negative Negative    pH, UA 5.5 4.5 - 8.0    Protein, UA Negative Negative mg/dL    Urobilinogen, UA <2.0 E.U./dL <2.0 E.U./dL, 2.0 E.U./dL    Nitrite, UA Negative Negative    Leukocytes, UA Negative Negative   Comprehensive metabolic panel   Result Value Ref Range    Sodium 141 136 - 145 mmol/L    Potassium 5.0 3.5 - 5.0 mmol/L    Chloride 106 98 - 107 mmol/L     CO2 27 22 - 31 mmol/L    Anion Gap, Calculation 8 5 - 18 mmol/L    Glucose 122 70 - 125 mg/dL    BUN 9 8 - 22 mg/dL    Creatinine 1.04 0.70 - 1.30 mg/dL    GFR MDRD Af Amer >60 >60 mL/min/1.73m2    GFR MDRD Non Af Amer >60 >60 mL/min/1.73m2    Bilirubin, Total 0.4 0.0 - 1.0 mg/dL    Calcium 9.6 8.5 - 10.5 mg/dL    Protein, Total 8.0 6.0 - 8.0 g/dL    Albumin 4.4 3.5 - 5.0 g/dL    Alkaline Phosphatase 83 45 - 120 U/L    AST 25 0 - 40 U/L    ALT 58 (H) 0 - 45 U/L   HM1 (CBC with Diff)   Result Value Ref Range    WBC 6.4 4.0 - 11.0 thou/uL    RBC 5.01 4.40 - 6.20 mill/uL    Hemoglobin 14.8 14.0 - 18.0 g/dL    Hematocrit 44.4 40.0 - 54.0 %    MCV 89 80 - 100 fL    MCH 29.5 27.0 - 34.0 pg    MCHC 33.3 32.0 - 36.0 g/dL    RDW 13.9 11.0 - 14.5 %    Platelets 172 140 - 440 thou/uL    MPV 13.5 (H) 8.5 - 12.5 fL    Neutrophils % 71 (H) 50 - 70 %    Lymphocytes % 18 (L) 20 - 40 %    Monocytes % 6 2 - 10 %    Eosinophils % 3 0 - 6 %    Basophils % 1 0 - 2 %    Neutrophils Absolute 4.6 2.0 - 7.7 thou/uL    Lymphocytes Absolute 1.2 0.8 - 4.4 thou/uL    Monocytes Absolute 0.4 0.0 - 0.9 thou/uL    Eosinophils Absolute 0.2 0.0 - 0.4 thou/uL    Basophils Absolute 0.1 0.0 - 0.2 thou/uL       Assessment:  1. Chronic gastritis without bleeding, unspecified gastritis type     2. Constipation, unspecified constipation type     3. Hepatic steatosis     4. Inflammatory polyarthropathy of hand (H)       Gastritis, will hold off on the pantoprazole for now stay on Pepcid to see above discussion    Hepatic steatosis needs to continue low carbohydrate diet    Constipation improved with diet, continue MiraLAX regularly and as needed Docosavit.    Inflammatory polyarthropathy continue to follow with rheumatology.    Immunization update needs a flu shot    Plan: As outlined above, recheck in 3 months    This transcription uses voice recognition software, which may contain typographical errors.      Video-Visit Details    Type of service:  Video  Visit    Video End Time (time video stopped): 11:02 AM  Originating Location (pt. Location): Home    Distant Location (provider location):  [facility]     Platform used for Video Visit: Tom Mart MD

## 2021-06-12 NOTE — PROGRESS NOTES
ASSESSMENT AND PLAN  Nesha Newton 56 y.o. male is here for follow-up of inflammatory arthropathy affecting hands, originally from [facility] ([place]  Accompanied by an .  He is done well with hydroxychloroquine.  He noted pain level down to 0 from 7.5/10.  I have asked him to finish the course of prednisone.  Continue hydroxychloroquine reminded him examination return for follow-up in 4 months or sooner.  Diagnoses and all orders for this visit:    Inflammatory polyarthropathy of hand  -     hydroxychloroquine (PLAQUENIL) 200 mg tablet; Take 1 tablet (200 mg total) by mouth 2 (two) times a day.  Dispense: 180 tablet; Refill: 0    Osteoarthritis Of The Knee      HISTORY OF PRESENTING ILLNESS:  Nesha Newton, 56 y.o., male is here for follow up of inflammatory polyarthritis.   Joints affected include Left hand, it would appear localized to the third metacarpophalangeal joint. This has gone on for many months. Pain is described as sharp and shooting. It is continuously.  His symptoms are now under excellent control.  The symptoms are gradually worsening. Symptoms include pain, tenderness to touch, swelling.  Treatment to date has been with significant relief.  He has reported no history of rash suggest psoriasis.  [place]  Currently he is working as a personal care  assistant.  F accompanied by his .  He reported the pain level to be moderately severe to severe in his left hand.  His knees also hurt by what that is mild pain.  He rated pain 7.5/10.  Day-to-day activities are affected because of this.  He noted swelling of the MCP area of the left hand.  He took various nonsteroidals without help.  1 month ago he was given prednisone.  That has helped him very significantly.  There is no family history of rheumatoid arthritis, lupus.  Other  historical information and ADL limitations as noted in the multidimensional health assessment questionnaire attached in the EMR.   "  ALLERGIES:Tramadol    PAST MEDICAL/ACTIVE PROBLEMS/MEDICATION/ FAMILY HISTORY/SOCIAL DATA:  The patient has a family history of  Past Medical History:   Diagnosis Date     GERD (gastroesophageal reflux disease)      Vitamin D deficiency      History   Smoking Status     Never Smoker   Smokeless Tobacco     Never Used     Patient Active Problem List   Diagnosis     Coccidioidomycosis     Vitamin D Deficiency     Osteoarthritis Of The Knee     Chronic Eosinophilic Pneumonia     Right upper quadrant abdominal pain     GERD (gastroesophageal reflux disease)     Pain of left hand     Polyarthralgia     Hepatic steatosis     Inflammatory polyarthropathy of hand     Adenomatous colon polyp     Current Outpatient Prescriptions   Medication Sig Dispense Refill     aspirin 81 MG EC tablet Take 1 tablet (81 mg total) by mouth daily. 90 tablet 3     cholecalciferol, vitamin D3, 2,000 unit Tab 1 po qd 30 tablet 11     docusate sodium (COLACE) 100 MG capsule Take 1 capsule (100 mg total) by mouth daily as needed for constipation. 30 capsule 6     hydroxychloroquine (PLAQUENIL) 200 mg tablet Take 200 mg by mouth 2 (two) times a day.  1     pantoprazole (PROTONIX) 20 MG tablet Take 1 tablet (20 mg total) by mouth daily. 30 tablet 5     No current facility-administered medications for this visit.      DETAILED EXAMINATION  08/07/17  :  Vitals:    08/07/17 1629   BP: 100/64   Patient Site: Right Arm   Patient Position: Sitting   Cuff Size: Adult Regular   Weight: 155 lb (70.3 kg)   Height: 5' 7\" (1.702 m)     Alert oriented. Head including the face is examined for malar rash, heliotropes, scarring, lupus pernio. Eyes examined for redness such as in episcleritis/scleritis, periorbital lesions.   Neck examined  for range of motion Both upper and lower extremities (all four) examined for swollen, warm &/or  tender joints, range of motion, rash, muscle weakness, edema. The salient normal / abnormal findings are appended.   No " appreciable synovitis in any of the palpable appendicular joints.       LAB / IMAGING DATA:  ALT   Date Value Ref Range Status   04/24/2017 47 (H) 0 - 45 U/L Final   12/20/2016 24 0 - 45 U/L Final   11/09/2016 27 0 - 45 U/L Final     Albumin   Date Value Ref Range Status   04/24/2017 4.0 3.5 - 5.0 g/dL Final   12/20/2016 4.0 3.5 - 5.0 g/dL Final   11/09/2016 4.2 3.5 - 5.0 g/dL Final     Creatinine   Date Value Ref Range Status   04/24/2017 1.04 0.70 - 1.30 mg/dL Final   12/20/2016 1.13 0.70 - 1.30 mg/dL Final   09/28/2015 1.17 0.70 - 1.30 mg/dL Final       WBC   Date Value Ref Range Status   01/11/2017 5.6 4.0 - 11.0 thou/uL Final   12/21/2016 7.4 4.0 - 11.0 thou/uL Final     Hemoglobin   Date Value Ref Range Status   01/11/2017 13.8 (L) 14.0 - 18.0 g/dL Final   12/21/2016 12.9 (L) 14.0 - 18.0 g/dL Final   12/20/2016 13.3 (L) 14.0 - 18.0 g/dL Final     Platelets   Date Value Ref Range Status   01/11/2017 125 (L) 140 - 440 thou/uL Final   12/21/2016 152 140 - 440 thou/uL Final   12/20/2016 171 140 - 440 thou/uL Final       Lab Results   Component Value Date    RF <15.0 02/13/2017    SEDRATE 5 01/11/2017

## 2021-06-13 NOTE — PROGRESS NOTES
Subjective: This patient comes in for follow-up patient is a 56-year-old male he has hypertriglyceridemia he did eat today back in May triglycerides 313 LDL 83.  I did put in a future order for lipid panel he will stop in for lab only not get back to him regarding the results.    Patient has been on hydroxychloroquine aspirin Colace and has taken over-the-counter omeprazole.  His insurance does not cover that anymore.    He does have some ongoing symptoms we will try some Pepcid 20 mg twice daily.    Since I saw him last he did get a colonoscopy he has adenomatous colon polyp will recheck again in 5 years June 2022.  No additional concerns or issues    Tobacco status: He  reports that he has never smoked. He has never used smokeless tobacco.    Patient Active Problem List    Diagnosis Date Noted     Adenomatous colon polyp 06/06/2017     Inflammatory polyarthropathy of hand 05/03/2017     Hepatic steatosis 04/24/2017     Polyarthralgia 04/05/2017     Right upper quadrant abdominal pain 12/20/2016     GERD (gastroesophageal reflux disease)      Coccidioidomycosis      Vitamin D Deficiency      Osteoarthritis Of The Knee      Chronic Eosinophilic Pneumonia        Current Outpatient Prescriptions   Medication Sig Dispense Refill     aspirin 81 MG EC tablet Take 1 tablet (81 mg total) by mouth daily. 90 tablet 3     cholecalciferol, vitamin D3, 2,000 unit Tab 1 po qd 30 tablet 11     docusate sodium (COLACE) 100 MG capsule Take 1 capsule (100 mg total) by mouth daily as needed for constipation. 30 capsule 6     hydroxychloroquine (PLAQUENIL) 200 mg tablet Take 1 tablet (200 mg total) by mouth 2 (two) times a day. 180 tablet 0     famotidine (PEPCID) 20 MG tablet Take 1 tablet (20 mg total) by mouth 2 (two) times a day. 60 tablet 5     No current facility-administered medications for this visit.        ROS: Review of systems positive as outlined for his reflux otherwise seems to be doing fairly well, no additional  concerns    Objective:    /80 (Patient Site: Left Arm, Patient Position: Sitting, Cuff Size: Adult Large)  Pulse 84  Temp 97.1  F (36.2  C) (Oral)   Resp 20  Wt 161 lb (73 kg)  BMI 25.22 kg/m2  Body mass index is 25.22 kg/(m^2).      General appearance no acute distress.    HEENT neck supple oropharynx clear pupils react normally no scleral icterus    Abdomen soft bowel sounds normal no guarding or rebound, no epigastric pain.    Extremities without edema skin was normal.    Labs from May as outlined    Results for orders placed or performed in visit on 05/17/17   Lipid [place] FASTING   Result Value Ref Range    Cholesterol 185 <=199 mg/dL    Triglycerides 313 (H) <=149 mg/dL    HDL Cholesterol 39 (L) >=40 mg/dL    LDL Calculated 83 <=129 mg/dL    Patient Fasting > 8hrs? Yes        Assessment:  1. Vitamin D deficiency  cholecalciferol, vitamin D3, 2,000 unit Tab   2. Gastritis  famotidine (PEPCID) 20 MG tablet   3. Constipation  docusate sodium (COLACE) 100 MG capsule   4. Adenomatous colon polyp     5. Hypertriglyceridemia  Lipid [place] FASTING     Patient has reflux he will try Pepcid as outlined continue Colace    Future lipid panel pending regarding elevated triglycerides    Follow-up colonoscopy in June 2022.  I also refilled his vitamin D today    Plan: As outlined above    This transcription uses voice recognition software, which may contain typographical errors.

## 2021-06-14 NOTE — PROGRESS NOTES
Nesha Newton is a 60 y.o. male who is being evaluated via a billable video visit.      How would you like to obtain your AVS? Mail a copy.  If dropped from the video visit, the video invitation should be resent by: Text to cell phone: [phone]  Will anyone else be joining your video visit? No      Video Start Time: 10:26 am  Assessment & Plan     Nesha was seen today for follow-up.    Diagnoses and all orders for this visit:    Gastroesophageal reflux disease, unspecified whether esophagitis present  -     pantoprazole (PROTONIX) 40 MG tablet; Take 1 tablet (40 mg total) by mouth daily.    Constipation, unspecified constipation type    Slow transit constipation  -     polyethylene glycol (GLYCOLAX) 17 gram/dose powder; Take 17 g by mouth 2 (two) times a day.  -     docusate sodium (COLACE) 100 MG capsule; 1 po daily for constipation. As needed    Chronic gastritis without bleeding, unspecified gastritis type  -     famotidine (PEPCID) 40 MG tablet; Take 1 tablet (40 mg total) by mouth every evening.    Vitamin D deficiency  -     cholecalciferol, vitamin D3, (VITAMIN D3) 50 mcg (2,000 unit) capsule; Take 1 capsule (2,000 Units total) by mouth daily.    Intention tremor  -     propranoloL (INDERAL LA) 60 mg 24 hr capsule; Take 1 capsule (60 mg total) by mouth daily.    Inflammatory polyarthropathy of hand (H)  -     aspirin 81 MG EC tablet; Take 1 tablet (81 mg total) by mouth daily.      Reflux, develop symptoms when off medication.  Continue Pepcid will try to get Protonix okayed again    Constipation issues increase fruits vegetables water and use MiraLAX    Tremor continue propranolol refill    Vitamin D deficiency stable continue the same    Inflammatory polyneuropathy continue to follow with rheumatology continue hydroxychloroquine no side effects    Patient will follow up for recheck 6 months.      Review of external notes as documented above , Dr. Banks rheumatologist consultation from 7/28/2020  "reviewed      Diagnosis or treatment significantly limited by social determinants of health -poor socioeconomic status,  none English speaking immigrant    18 minutes spent on the date of the encounter doing chart review, history and exam, documentation and further activities as noted above         BMI:   Estimated body mass index is 27.16 kg/m  as calculated from the following:    Height as of 1/28/20: 5' 4\" (1.626 m).    Weight as of 8/5/20: 158 lb 4 oz (71.8 kg).   As outlined good diet and exercise      No follow-ups on file.    Max Mart MD  [facility]    Subjective     Nesha Newton is 60 y.o. and presents to clinic today for the following health issues   HPI   This patient had a virtual visit, video, due to the coronavirus pandemic.    This patient follows up regarding some GI problems.  Has had some gastritis and constipation issues.    Patient has gastritis they have not been covering the pantoprazole because he had used it for too long in 1 calendar year were to try to see if that gets covered again.    Patient is out of Pepcid for a week that was helping also.    I did refill the Pepcid and the Protonix    Regarding constipation.  Will use some MiraLAX discussed diet activity.  Needs to eat more fruits and vegetables push more fluids.    Patient's had tremor.  He needed a refill on his propranolol.    Its been fairly well controlled.    Also history of vitamin D deficiency.  And inflammation, inflammatory polyarthropathy.  He sees specialist Dr. Banks was last there 7/28/2020.  Continues on hydroxychloroquine no side effects.      Review of Systems  10 point review of systems positive as outlined above otherwise negative      Objective       Vitals:  No vitals were obtained today due to virtual visit.    Physical Exam  General appearance: No acute distress    HEENT denies any headache no vision changes no temporal artery tenderness    Lungs: Nonlabored breathing no " wheezing.    Heart: No palpitations or rapid heart rate    Abdomen nontender.  Denies any bloating    Neurologically tremor as before with, intention.    Extremities without synovitis through the hands.    Skin is normal no rashes        Video-Visit Details    Type of service:  Video Visit    Video End Time (time video stopped): 10:39 AM  Originating Location (pt. Location): Home    Distant Location (provider location):  [facility]     Platform used for Video Visit: [employer]

## 2021-06-15 PROBLEM — K76.0 HEPATIC STEATOSIS: Status: ACTIVE | Noted: 2017-04-24

## 2021-06-15 PROBLEM — M06.4: Status: ACTIVE | Noted: 2017-05-03

## 2021-06-15 PROBLEM — M25.50 POLYARTHRALGIA: Status: ACTIVE | Noted: 2017-04-05

## 2021-06-15 NOTE — PROGRESS NOTES
ASSESSMENT: Nesha Newton is a 60 y.o. male with past medical history significant for vitamin D deficiency, GERD, hepatic steatosis, chronic gastritis, right carpal tunnel syndrome, inflammatory polyarthropathy of hand (on Plaquenil, follows with Dr. Banks) who presents today for new patient evaluation of an acute flare of chronic left low back pain with intermittent left lower extremity paresthesias.  My review of the CT abdomen pelvis showed moderate disc degeneration L5-S1.  There is facet arthropathy and suggestiong of bilateral foraminal stenosis at this level.  Patient is neurologically intact on exam.    MASON: 70  Who 5: 15    PLAN:  A shared decision making model was used.  The patient's values and choices were respected.  The following represents what was discussed and decided upon by the physician assistant and the patient.  A telephone  was used for the visit.    1.  DIAGNOSTIC TESTS: I reviewed the CT abdomen pelvis.  No further diagnostic tests were ordered.  Patient was neurologically intact.  He does not have any red flags.  An MRI would not change our treatment plan at this time.  If symptoms fail to improve with conservative management, would recommend an MRI lumbar spine for further evaluation.    2.  PHYSICAL THERAPY:  Discussed the importance of core strengthening, ROM, stretching exercises with the patient and how each of these entities is important in decreasing pain.  Explained to the patient that the purpose of physical therapy is to teach the patient a home exercise program.  These exercises need to be performed every day in order to decrease pain and prevent future occurrences of pain.  I entered a referral for physical therapy.  He will do the medics program.    3.  MEDICATIONS:    -I refilled the patient's oxycodone 5 mg 1 tab every 6 hours as needed, #10 with no refills.  I checked the [place] prescription monitoring database.  Only opiate prescription within the past 1  year was for 10 tabs of oxycodone 5 mg March 12, 2021.  I reviewed the risk of this medication.  I will not provide this medication long-term.  I will not provide telephone refills.  -Gabapentin 100 mg 3 times daily was also prescribed.  We could titrate his dose higher, depending on his response.  -Patient can continue Tylenol and ibuprofen as needed.  -We also discussed trying a muscle relaxer, but he declined at this point.  If he changes his mind, I would prescribe methocarbamol 500 mg 3 times daily as needed.    4.  INTERVENTIONS: No interventions were ordered.  Patient may benefit from interventional pain management if he fails to improve with conservative treatment, depending on the results of advanced imaging studies.  Based on his exam today, would likely begin with left lower lumbar facet joint injections.  If that did not help, we could try left L5-S1 transforaminal epidural steroid injection.  Ideally patient would have an MRI lumbar spine before interventional pain management.    5.  PATIENT EDUCATION: Patient is in agreement the above plan.  All questions were answered.    6.  FOLLOW-UP:   I will see the patient back in clinic in 2 weeks.  If he has questions or concerns in the meantime, he should not hesitate to call.      SUBJECTIVE:  Nesha Newton  Is a 60 y.o. male who presents today as a referral from the emergency department (March 12, 2021) for new patient evaluation of an acute flare of chronic left low back pain with intermittent left lower extremity numbness and tingling.  Patient reports that he has had chronic back pain.  About 1 week ago he began to experience severe pain in the left low back.  He denies any injury or event to cause the pain.  He states he was just sitting when the pain came on suddenly.  It is been so severe that he has had to walk with a cane.  He had similar pain 10 years ago while he was in [place], but at that time the pain spans across the low back bilaterally.   He participated in physical therapy and had a back brace which was helpful for his pain at that time.    Patient complains of left low back pain.  Pain is located left lower lumbar region.  He denies any pain radiating down the leg, but he states that when the back pain is severe he gets tingling that radiates all the way down the left leg.  He describes the pain as a constant pain which is sharp with certain movements.  Bending, twisting, transitioning from seated to standing, and prolonged sitting aggravate the pain.  Pain is alleviated with lying still.  He denies weakness down the legs.  He denies any loss of bowel or bladder control.  Denies any recent fevers, chills, or sweats.    As mentioned above, patient did physical therapy about 10 years ago for similar back pain.  He admits he does not do his home exercises.  He does not go to a chiropractor.  He has not had any spine injections.  Patient states he did have an injection in [place] but it sounds like this was just a injection of pain medication and an IV or possibly IM.  He has not had any spine surgery.  He has been taking oxycodone 5 mg every 6 hours as needed which is helpful.  He request a refill.  He also takes Tylenol and ibuprofen as needed.    Current Outpatient Medications on File Prior to Visit   Medication Sig Dispense Refill     acetaminophen (TYLENOL) 500 MG tablet Take 2 tablets (1,000 mg total) by mouth every 6 (six) hours as needed for pain. 100 tablet 1     aspirin 81 MG EC tablet Take 1 tablet (81 mg total) by mouth daily. 90 tablet 1     cholecalciferol, vitamin D3, (VITAMIN D3) 50 mcg (2,000 unit) capsule Take 1 capsule (2,000 Units total) by mouth daily. 30 capsule 5     docusate sodium (COLACE) 100 MG capsule 1 po daily for constipation. As needed 30 capsule 5     famotidine (PEPCID) 40 MG tablet Take 1 tablet (40 mg total) by mouth every evening. 30 tablet 5     hydrocortisone-pramoxine (ANALPRAM-HC) 2.5-1 % rectal cream Insert  into the rectum 3 (three) times a day. Apply  Externally bid as directed 30 g 1     hydrOXYchloroQUINE (PLAQUENIL) 200 mg tablet Take 1 tablet (200 mg total) by mouth daily. 90 tablet 1     ibuprofen (ADVIL,MOTRIN) 600 MG tablet TAKE 1 TABLET BY MOUTH THREE TIMES DAILY AS NEEDED FOR PAIN 60 tablet 1     oxyCODONE (ROXICODONE) 5 MG immediate release tablet Take 1 tablet (5 mg total) by mouth every 6 (six) hours as needed for pain. 10 tablet 0     pantoprazole (PROTONIX) 40 MG tablet Take 1 tablet (40 mg total) by mouth daily. 30 tablet 5     polyethylene glycol (GLYCOLAX) 17 gram/dose powder Take 17 g by mouth 2 (two) times a day. 765 g 5     propranoloL (INDERAL LA) 60 mg 24 hr capsule Take 1 capsule (60 mg total) by mouth daily. 30 capsule 5     sucralfate (CARAFATE) 1 gram tablet Take 1 tablet (1 g total) by mouth 4 (four) times a day for 21 days. 84 tablet 0         Allergies   Allergen Reactions     Tramadol Itching     Pt states itchy all over     Primidone Other (See Comments)     Dizziness and vomitting       Past Medical History:   Diagnosis Date     Chronic gastritis without bleeding, unspecified gastritis type 4/3/2018     Coccidioidomycosis     Created by Conversion      GERD (gastroesophageal reflux disease)      Hepatic steatosis 4/24/2017    HCV neg,      Osteoarthritis Of The Knee     Created by Conversion  Replacement Utility updated for latest IMO load     Polyarthralgia 4/5/2017     Vitamin D deficiency       Patient Active Problem List   Diagnosis     Vitamin D Deficiency     Osteoarthritis Of The Knee     Chronic Eosinophilic Pneumonia     Right upper quadrant abdominal pain     GERD (gastroesophageal reflux disease)     Polyarthralgia     Hepatic steatosis     Inflammatory polyarthropathy of hand (H)     Adenomatous polyp of colon, unspecified part of colon     Primary osteoarthritis of left knee     Primary osteoarthritis of right knee     Chronic gastritis without bleeding, unspecified  gastritis type     Right shoulder tendinitis     Right carpal tunnel syndrome     High risk medication use     Tremor     Benign neoplasm of transverse colon     Other hemorrhoids     Polyp of colon     Unspecified abdominal pain     Constipation, unspecified constipation type         Past surgical history: Left knee surgery?    Family History   Problem Relation Age of Onset     Diabetes Mother        Social history: Patient is .  He does not work.  He denies tobacco, alcohol, or illicit drug use.      ROS: Positive for abdominal pain, joint pain.  Specifically negative for bowel/bladder dysfunction, fevers,chills, appetite changes, unexplained weight loss.   Otherwise 13 systems reviewed are negative.  Please see the patient's intake questionnaire from today for details.      OBJECTIVE:  PHYSICAL EXAMINATION:    CONSTITUTIONAL:  Vital signs as above.  No acute distress.  The patient is well nourished and well groomed.  PSYCHIATRIC:  The patient is awake, alert, oriented to person, place, time and answering questions appropriately with clear speech.    HEENT: Normocephalic, atraumatic.  Sclera clear.  Neck is supple.  SKIN:  Skin over the face, bilateral lower extremities, and posterior torso is clean, dry, intact without rashes.    GAIT:  Gait is antalgic, favoring the left.  He walks with a cane for assistance.  The patient is able to rise onto toes and heels bilaterally with support.  STANDING EXAMINATION: No significant tenderness palpation bilateral lower lumbar paraspinous muscles.  Patient does point to the left L5-S1 region when asked to point to the pain.  Lumbar flexion is severely restricted with reproduction of pain.  Lumbar extension within normal limits.  Lumbar facet loading maneuvers reproduce low back pain on the left.  MUSCLE STRENGTH:  The patient has 5/5 strength for the bilateral hip flexors, knee flexors/extensors, ankle dorsiflexors/plantar flexors, great toe extensors, ankle  evertors/invertors.  NEUROLOGICAL:  2/4 patellar, and achilles reflexes bilaterally.  Negative Babinski's bilaterally.  No ankle clonus bilaterally. Sensation to light touch is intact in the bilateral L4, L5, and S1 dermatomes.  VASCULAR:  2/4 dorsalis pedis pulses bilaterally.  Bilateral lower extremities are warm.  There is no pitting edema of the bilateral lower extremities.  ABDOMINAL:  Soft, non-distended, non-tender throughout all quadrants.  No pulsatile mass palpated in the left lower quadrant.  LYMPH NODES:  No palpable or tender inguinal lymph nodes.  MUSCULOSKELETAL: Straight leg raise negative bilaterally.    RESULTS: I reviewed a CT abdomen and pelvis from [facility] dated March 12, 2021.  This shows moderate disc degeneration L5-S1.  There is mild appearing facet arthropathy L5-S1.  There is suggestion of bilateral foraminal stenosis at this level.

## 2021-06-15 NOTE — PROGRESS NOTES
ASSESSMENT AND PLAN:  Nesha Newton 57 y.o. male is a for follow-up of inflammatory arthropathy.  He has osteoarthritis.  He is on hydroxychloroquine.  He reports that this is helping.  He has residual pain in his left shoulder.  When he has features of impingement.  He also gets corticosteroid injections into his knees elsewhere.  We discussed the management principles of osteoarthritis.  He does not want to proceed local injection the left shoulder this time.  I have asked him to take diclofenac major side effects outlined.  He is already on omeprazole to continue this.  Continue hydroxychloroquine reminded of the eye examination.  Return for follow-up here in 3 months.          Diagnoses and all orders for this visit:    Inflammatory polyarthropathy of hand  -     diclofenac (VOLTAREN) 75 MG EC tablet; Take 1 tablet (75 mg total) by mouth 2 (two) times a day.  Dispense: 60 tablet; Refill: 1  -     hydroxychloroquine (PLAQUENIL) 200 mg tablet; TAKE ONE TABLET BY MOUTH TWICE DAILY  Dispense: 180 tablet; Refill: 0    Osteoarthritis Of The Knee  -     diclofenac (VOLTAREN) 75 MG EC tablet; Take 1 tablet (75 mg total) by mouth 2 (two) times a day.  Dispense: 60 tablet; Refill: 1    Polyarthralgia  -     diclofenac (VOLTAREN) 75 MG EC tablet; Take 1 tablet (75 mg total) by mouth 2 (two) times a day.  Dispense: 60 tablet; Refill: 1      HISTORY OF PRESENTING ILLNESS:  Nesha Newton, 57 y.o., male is here for follow up of inflammatory polyarthritis.   He has osteoarthritis.  He is noted pain in his left shoulder radiating down the arm going over the past few weeks sometimes wakes him up from sleep.  This is worse with the full range of motion, reaching.  No history of trauma.  He rated this pain as a 5.0/10.  He noted no fever weight loss blurry vision eye redness mouth also nausea cough.  He is taking hydroxychloroquine.  He has had injection into his knees done in December elsewhere.  In the past for inflammatory  arthropathy diclofenac did not help.  We discussed the possibility that it might help him with the osteoarthritis symptoms as well as the left shoulder pain.  He would like to try this.  Major side effects including GI renal and heart were discussed.  That has helped him very significantly.  There is no family history of rheumatoid arthritis, lupus.  Other  historical information and ADL limitations as noted in the multidimensional health assessment questionnaire attached in the EMR.    ALLERGIES:Tramadol    PAST MEDICAL/ACTIVE PROBLEMS/MEDICATION/ FAMILY HISTORY/SOCIAL DATA:  The patient has a family history of  Past Medical History:   Diagnosis Date     GERD (gastroesophageal reflux disease)      Vitamin D deficiency      History   Smoking Status     Never Smoker   Smokeless Tobacco     Never Used     Patient Active Problem List   Diagnosis     Coccidioidomycosis     Vitamin D Deficiency     Osteoarthritis Of The Knee     Chronic Eosinophilic Pneumonia     Right upper quadrant abdominal pain     GERD (gastroesophageal reflux disease)     Polyarthralgia     Hepatic steatosis     Inflammatory polyarthropathy of hand     Adenomatous colon polyp     Current Outpatient Prescriptions   Medication Sig Dispense Refill     aspirin 81 MG EC tablet Take 1 tablet (81 mg total) by mouth daily. 90 tablet 3     cholecalciferol, vitamin D3, 2,000 unit Tab 1 po qd 30 tablet 11     docusate sodium (COLACE) 100 MG capsule Take 1 capsule (100 mg total) by mouth daily as needed for constipation. 30 capsule 6     famotidine (PEPCID) 20 MG tablet Take 1 tablet (20 mg total) by mouth 2 (two) times a day. 60 tablet 5     hydroxychloroquine (PLAQUENIL) 200 mg tablet TAKE ONE TABLET BY MOUTH TWICE DAILY 180 tablet 0     omeprazole (PRILOSEC) 20 MG capsule Take 20 mg by mouth daily.       polyethylene glycol (GLYCOLAX) 17 gram/dose powder Take 17 g by mouth daily. 255 g 6     No current facility-administered medications for this visit.   "    DETAILED EXAMINATION  02/05/18  :  Vitals:    02/05/18 0946   BP: 118/68   Pulse: 68   Weight: 163 lb (73.9 kg)   Height: 5' 7\" (1.702 m)     Alert oriented. Head including the face is examined for malar rash, heliotropes, scarring, lupus pernio. Eyes examined for redness such as in episcleritis/scleritis, periorbital lesions.   Neck/ Face examined for parotid gland swelling, range of motion of neck.  Left upper and lower and right upper and lower extremities examined for tenderness, swelling, warmth of the appendicular joints, range of motion, edema, rash.  Some of the important findings included: Is mild impingement of the left shoulder, no synovitis in his hands except subtle swelling at the left second MCP.  Mild JLT of the knees.      LAB / IMAGING DATA:  ALT   Date Value Ref Range Status   04/24/2017 47 (H) 0 - 45 U/L Final   12/20/2016 24 0 - 45 U/L Final   11/09/2016 27 0 - 45 U/L Final     Albumin   Date Value Ref Range Status   04/24/2017 4.0 3.5 - 5.0 g/dL Final   12/20/2016 4.0 3.5 - 5.0 g/dL Final   11/09/2016 4.2 3.5 - 5.0 g/dL Final     Creatinine   Date Value Ref Range Status   04/24/2017 1.04 0.70 - 1.30 mg/dL Final   12/20/2016 1.13 0.70 - 1.30 mg/dL Final   09/28/2015 1.17 0.70 - 1.30 mg/dL Final       WBC   Date Value Ref Range Status   12/14/2017 5.0 4.0 - 11.0 thou/uL Final   01/11/2017 5.6 4.0 - 11.0 thou/uL Final     Hemoglobin   Date Value Ref Range Status   12/14/2017 15.3 14.0 - 18.0 g/dL Final   01/11/2017 13.8 (L) 14.0 - 18.0 g/dL Final   12/21/2016 12.9 (L) 14.0 - 18.0 g/dL Final     Platelets   Date Value Ref Range Status   12/14/2017 167 140 - 440 thou/uL Final   01/11/2017 125 (L) 140 - 440 thou/uL Final   12/21/2016 152 140 - 440 thou/uL Final       Lab Results   Component Value Date    RF <15.0 02/13/2017    SEDRATE 5 01/11/2017        "

## 2021-06-15 NOTE — PATIENT INSTRUCTIONS - HE
An order for physical therapy has been provided today.  Someone will call you to schedule physical therapy or you can call [phone] to schedule physical therapy.  It will be very important for you to do your physical therapy exercises on a regular basis to decrease your pain and prevent future flares of pain.     Oxycodone was prescribed today. Please lock this medication up when you are not taking it. Do not share this medication with other people. Do not increase the dose without permission from your physician. Do not drink alcohol while you take this medication as this can lead to death. Do not take other pain medications without approval from your physician or this can also lead to death. If you need a refill of this medication, you must come in to clinic by appointment. Please call if you have any questions on how to take this medication.

## 2021-06-15 NOTE — PROGRESS NOTES
Nesha Newton is a 60 y.o. male who is being evaluated via a billable telephone visit.      What phone number would you like to be contacted at? [phone]  How would you like to obtain your AVS? AVS Preference: Mail a copy.  Phone call duration: 6 minutes    This document was created using a software with less than 100% fidelity, at times resulting in unintended, even erroneous syntax and grammar.  The reader is advised to keep this under consideration while reviewing, interpreting this note.           ASSESSMENT AND PLAN:    Diagnoses and all orders for this visit:    Inflammatory polyarthropathy of hand (H)  -     Discontinue: hydrOXYchloroQUINE (PLAQUENIL) 200 mg tablet; Take 1 tablet (200 mg total) by mouth daily.  Dispense: 90 tablet; Refill: 0  -     hydrOXYchloroQUINE (PLAQUENIL) 200 mg tablet; Take 1 tablet (200 mg total) by mouth daily.  Dispense: 90 tablet; Refill: 1    Polyarthralgia  -     Discontinue: hydrOXYchloroQUINE (PLAQUENIL) 200 mg tablet; Take 1 tablet (200 mg total) by mouth daily.  Dispense: 90 tablet; Refill: 0  -     hydrOXYchloroQUINE (PLAQUENIL) 200 mg tablet; Take 1 tablet (200 mg total) by mouth daily.  Dispense: 90 tablet; Refill: 1    High risk medication use        Follow up in 6 months      HISTORY OF PRESENTING ILLNESS:  Nesha Newton 60 y.o. is evaluated here via phone connection, with the  on line, he has polyarthralgias in association with inflammatory joint disease, osteoarthritis, tendinopathy of the shoulders.He goes to the eye doctor regularly.  He wonders if he can go there every 6 months.  He is on hydroxychloroquine.  He has had no flareup of shoulder pain.  No rash, mouth ulcer, photosensitivity.       ROS enquiry held for fever, ocular symptoms, rash, headache,  GI issues.  Today we also discussed the issues related to the current pandemic, the pros and cons of the current treatment plan, the CDC guidelines such as social distancing washing the hands  covering the cough.  ALLERGIES:Tramadol and Primidone    PAST MEDICAL/ACTIVE PROBLEMS/MEDICATION/SOCIAL DATA  Past Medical History:   Diagnosis Date     Chronic gastritis without bleeding, unspecified gastritis type 4/3/2018     Coccidioidomycosis     Created by Conversion      GERD (gastroesophageal reflux disease)      Hepatic steatosis 4/24/2017    HCV neg,      Osteoarthritis Of The Knee     Created by Conversion  Replacement Utility updated for latest IMO load     Polyarthralgia 4/5/2017     Vitamin D deficiency      Social History     Tobacco Use   Smoking Status Never Smoker   Smokeless Tobacco Never Used     Patient Active Problem List   Diagnosis     Vitamin D Deficiency     Osteoarthritis Of The Knee     Chronic Eosinophilic Pneumonia     Right upper quadrant abdominal pain     GERD (gastroesophageal reflux disease)     Polyarthralgia     Hepatic steatosis     Inflammatory polyarthropathy of hand (H)     Adenomatous polyp of colon, unspecified part of colon     Primary osteoarthritis of left knee     Primary osteoarthritis of right knee     Chronic gastritis without bleeding, unspecified gastritis type     Right shoulder tendinitis     Right carpal tunnel syndrome     High risk medication use     Tremor     Benign neoplasm of transverse colon     Other hemorrhoids     Polyp of colon     Unspecified abdominal pain     Constipation, unspecified constipation type     Current Outpatient Medications   Medication Sig Dispense Refill     acetaminophen (TYLENOL) 500 MG tablet Take 2 tablets (1,000 mg total) by mouth every 6 (six) hours as needed for pain. 100 tablet 1     aspirin 81 MG EC tablet Take 1 tablet (81 mg total) by mouth daily. 90 tablet 1     cholecalciferol, vitamin D3, (VITAMIN D3) 50 mcg (2,000 unit) capsule Take 1 capsule (2,000 Units total) by mouth daily. 30 capsule 5     docusate sodium (COLACE) 100 MG capsule 1 po daily for constipation. As needed 30 capsule 5     famotidine (PEPCID) 40 MG  tablet Take 1 tablet (40 mg total) by mouth every evening. 30 tablet 5     hydrOXYchloroQUINE (PLAQUENIL) 200 mg tablet Take 1 tablet (200 mg total) by mouth daily. 90 tablet 0     ibuprofen (ADVIL,MOTRIN) 600 MG tablet TAKE 1 TABLET BY MOUTH THREE TIMES DAILY AS NEEDED FOR PAIN 60 tablet 1     pantoprazole (PROTONIX) 40 MG tablet Take 1 tablet (40 mg total) by mouth daily. 30 tablet 5     polyethylene glycol (GLYCOLAX) 17 gram/dose powder Take 17 g by mouth 2 (two) times a day. 765 g 5     propranoloL (INDERAL LA) 60 mg 24 hr capsule Take 1 capsule (60 mg total) by mouth daily. 30 capsule 5     sucralfate (CARAFATE) 1 gram tablet Take 1 tablet (1 g total) by mouth 4 (four) times a day. 28 tablet 0     hydrocortisone-pramoxine (ANALPRAM-HC) 2.5-1 % rectal cream Insert into the rectum 3 (three) times a day. Apply  Externally bid as directed 30 g 1     No current facility-administered medications for this visit.          EXAMINATION:       On the phone sounded comfortable, alert, oriented, speech was fluent,  memory recall appeared normal, did not sound like was in pain or short of breath.    LAB / IMAGING DATA:  ALT   Date Value Ref Range Status   08/05/2020 60 (H) 0 - 45 U/L Final   07/28/2020 50 (H) 0 - 45 U/L Final   12/29/2019 17 0 - 45 U/L Final     Albumin   Date Value Ref Range Status   08/05/2020 4.4 3.5 - 5.0 g/dL Final   07/28/2020 4.4 3.5 - 5.0 g/dL Final   12/29/2019 4.5 3.5 - 5.0 g/dL Final     Creatinine   Date Value Ref Range Status   02/18/2021 1.08 0.70 - 1.30 mg/dL Final   08/05/2020 1.04 0.70 - 1.30 mg/dL Final   07/28/2020 1.07 0.70 - 1.30 mg/dL Final       WBC   Date Value Ref Range Status   02/18/2021 6.3 4.0 - 11.0 thou/uL Final   08/05/2020 6.4 4.0 - 11.0 thou/uL Final     Hemoglobin   Date Value Ref Range Status   02/18/2021 13.6 (L) 14.0 - 18.0 g/dL Final   08/05/2020 14.8 14.0 - 18.0 g/dL Final   07/28/2020 14.9 14.0 - 18.0 g/dL Final     Platelets   Date Value Ref Range Status   02/18/2021  161 140 - 440 thou/uL Final   08/05/2020 172 140 - 440 thou/uL Final   07/28/2020 139 (L) 140 - 440 thou/uL Final       Lab Results   Component Value Date    RF <15.0 02/13/2017    SEDRATE 2 04/04/2019

## 2021-06-16 PROBLEM — M17.11 PRIMARY OSTEOARTHRITIS OF RIGHT KNEE: Status: ACTIVE | Noted: 2018-04-03

## 2021-06-16 PROBLEM — M77.8 RIGHT SHOULDER TENDINITIS: Status: ACTIVE | Noted: 2018-06-07

## 2021-06-16 PROBLEM — K29.50 CHRONIC GASTRITIS WITHOUT BLEEDING, UNSPECIFIED GASTRITIS TYPE: Status: ACTIVE | Noted: 2018-04-03

## 2021-06-16 PROBLEM — K64.8 OTHER HEMORRHOIDS: Status: ACTIVE | Noted: 2017-06-01

## 2021-06-16 PROBLEM — G56.01 RIGHT CARPAL TUNNEL SYNDROME: Status: ACTIVE | Noted: 2019-01-10

## 2021-06-16 PROBLEM — R10.9 UNSPECIFIED ABDOMINAL PAIN: Status: ACTIVE | Noted: 2017-06-01

## 2021-06-16 PROBLEM — M17.12 PRIMARY OSTEOARTHRITIS OF LEFT KNEE: Status: ACTIVE | Noted: 2018-04-03

## 2021-06-16 PROBLEM — R25.1 TREMOR: Status: ACTIVE | Noted: 2019-09-26

## 2021-06-16 PROBLEM — D12.3 BENIGN NEOPLASM OF TRANSVERSE COLON: Status: ACTIVE | Noted: 2017-06-01

## 2021-06-16 PROBLEM — K59.00 CONSTIPATION, UNSPECIFIED CONSTIPATION TYPE: Status: ACTIVE | Noted: 2020-09-16

## 2021-06-16 PROBLEM — D12.6 ADENOMATOUS POLYP OF COLON, UNSPECIFIED PART OF COLON: Status: ACTIVE | Noted: 2017-06-06

## 2021-06-16 PROBLEM — K63.5 POLYP OF COLON: Status: ACTIVE | Noted: 2017-06-01

## 2021-06-16 PROBLEM — N40.0 BENIGN PROSTATIC HYPERPLASIA, UNSPECIFIED WHETHER LOWER URINARY TRACT SYMPTOMS PRESENT: Status: ACTIVE | Noted: 2021-03-17

## 2021-06-16 PROBLEM — Z79.899 HIGH RISK MEDICATION USE: Status: ACTIVE | Noted: 2019-09-26

## 2021-06-16 NOTE — PROGRESS NOTES
Assessment:   Nesha Newton is a 60 y.o. y.o. male with past medical history significant for vitamin D deficiency, GERD, hepatic steatosis, chronic gastritis, right carpal tunnel syndrome, inflammatory polyarthropathy of hand (on Plaquenil, follows with Dr. Banks) who presents today for follow-up regarding acute on chronic left low back pain.  Patient previously had left lower extremity paresthesias which have resolved.  My review of the CT abdomen and pelvis showed moderate disc degeneration L5-S1.  There is facet arthropathy and suggestion of bilateral foraminal stenosis at this level.  Condition has improved.  He reports 25% improvement in his pain over the past 2 weeks.  Patient is also dealing with some constipation with left lower quadrant pain which I think could be contributing to his left low back pain as well.       Plan:     A shared decision making plan was used.  The patient's values and choices were respected.  The following represents what was discussed and decided upon by the physician assistant and the patient.  A telephone  was used for the visit.    1.  DIAGNOSTIC TESTS: I reviewed the CT abdomen and pelvis.  No further diagnostic tests were ordered.  Patient is making progress.  He is neurologically intact.  He does not have any red flags.  If symptoms fail to improve with a trial of physical therapy, would recommend an MRI lumbar spine as the next day.    2.  PHYSICAL THERAPY: Patient is currently in physical therapy.  He has had 1 session so far.  He is doing his home exercises.  He has additional sessions scheduled.  I encouraged him to continue with physical therapy and the home exercises.    3.  MEDICATIONS:    -Lidocaine ointment was prescribed.  He can follow this twice daily as needed.  -I recommended we do not use any more opiates.  They could worsen constipation.  -Patient will continue gabapentin 100 mg 3 times daily.  -Patient can continue Tylenol and ibuprofen as  needed.  -We previously discussed trying a muscle relaxer, but he declined at this point.  If he changes his mind, I would prescribe methocarbamol 500 mg 3 times daily as needed.    4.  INTERVENTIONS:  No interventions were ordered.  Patient may benefit from interventional pain management if he fails to improve with conservative treatment, depending on the results of advanced imaging studies.  Based on his exam today, would likely begin with left lower lumbar facet joint injections.  If that did not help, we could try left L5-S1 transforaminal epidural steroid injection.  Ideally patient would have an MRI lumbar spine before interventional pain management.    5.  PATIENT EDUCATION: Patient is in agreement the above plan.  All questions were answered.  -I recommended that he continue to work with Dr. Mart regarding constipation.  He is using MiraLAX and Colace.  He states he does not have a bowel movement every day.    6.  FOLLOW-UP: Patient will follow up with me in 4 weeks.  If he has questions or concerns in the meantime, he should not hesitate to call.    Subjective:     Nesha Newton is a 60 y.o. male who presents today for follow-up regarding an acute flare of chronic left low back pain with intermittent left lower extremity numbness and tingling.  I saw the patient in consultation March 16, 2021.  I provided a small prescription for oxycodone and I prescribed gabapentin.  I referred the patient to physical therapy.  He has had 1 session so far.  Patient reports 25% improvement in his pain since he was last seen.    Patient complains of more mild residual left low back pain.  He is no longer experiencing any numbness or tingling down the leg.  He has been experiencing some left lower quadrant abdominal pain.  Dr. Mart added Colace to his medication regimen for constipation 2 weeks ago.  He still is not having a bowel movement every day.  Patient rates his pain today as a 5-10.  Pain is aggravated with  sitting.  He also has increased pain at night.  He denies any alleviating factors to the pain.  He denies any new symptoms since he was last seen.  Denies weakness.  Denies loss of bowel or bladder control.  Denies any recent fevers.    Patient has had 1 session of physical therapy.  He is doing his home exercises.  He is taking gabapentin 100 mg 3 times daily.  He uses Tylenol and ibuprofen as needed.  He does feel that medications are helpful.    Review of Systems:  Positive for pain much worse at night.  Negative for numbness/tingling, weakness, loss of bowel/bladder control, inability to urinate, headache, trip/stumble/falls, difficulty swallowing, difficulty with hand skills, fevers, unintentional weight loss.     Objective:   CONSTITUTIONAL:  Vital signs as above.  No acute distress.  The patient is well nourished and well groomed.    PSYCHIATRIC:  The patient is awake, alert, oriented to person, place and time.  The patient is answering questions appropriately with clear speech.  Normal affect.  HEENT: Normocephalic, atraumatic.  Sclera clear.    SKIN:  Skin over the face, posterior torso, bilateral upper and lower extremities is clean, dry, intact without rashes.  VASCULAR: No significant lower extremity edema.  MUSCULOSKELETAL:  Gait is non-antalgic.  The patient is able to heel and toe walk without any difficulty.  No significant tenderness over the bilateral lower lumbar paraspinal muscles.      The patient has 5/5 strength for the bilateral hip flexors, knee flexors/extensors, ankle dorsiflexors/plantar flexors, ankle evertors/invertors.    NEUROLOGICAL:    No ankle clonus bilaterally.  Sensation to light touch is intact in the bilateral L4, L5, and S1 dermatomes.       RESULTS:  I reviewed a CT abdomen and pelvis from [facility] dated March 12, 2021.  This shows moderate disc degeneration L5-S1.  There is mild appearing facet arthropathy L5-S1.  There is suggestion of bilateral foraminal stenosis at this  level.

## 2021-06-16 NOTE — PROGRESS NOTES
Assessment & Plan     Nesha was seen today for follow-up.    Diagnoses and all orders for this visit:    Gastroesophageal reflux disease, unspecified whether esophagitis present  -     sucralfate (CARAFATE) 1 gram tablet; 1 po four times a day before meals and at bedtime as needed for reflux    Slow transit constipation  -     polyethylene glycol (GLYCOLAX) 17 gram/dose powder; Take 17 g by mouth 2 (two) times a day.    Polyarthralgia  -     ibuprofen (ADVIL,MOTRIN) 600 MG tablet; TAKE 1 TABLET BY MOUTH THREE TIMES DAILY AS NEEDED FOR PAIN    Lower abdominal pain  -     acetaminophen (TYLENOL) 500 MG tablet; Take 2 tablets (1,000 mg total) by mouth every 6 (six) hours as needed for pain.    Vitamin D deficiency  -     cholecalciferol, vitamin D3, (VITAMIN D3) 50 mcg (2,000 unit) capsule; Take 1 capsule (2,000 Units total) by mouth daily.    Hepatic steatosis    Low back pain, unspecified back pain laterality, unspecified chronicity, unspecified whether sciatica present    Adenomatous polyp of colon, unspecified part of colon      History of some abdominal symptoms multifactorial he has had reflux.  Continue on famotidine and Carafate and pantoprazole    Constipation continue on MiraLAX and docusate, now improved diet has helped    Refill on his vitamin D.    Encouraged better diet with hepatic steatosis decreasing carbohydrates    Adenomatous colon polyp recheck in 2022    Low pain, followed by spine care please see discussion regarding the gabapentin and short amount of oxycodone.    Recheck with me in 3 months        Review of external notes as documented in note  Review of the result(s) of each unique test - Review of labs and scans from March as outlined below, also review of spine care consultation  Diagnosis or treatment significantly limited by social determinants of health - Non-English-speaking immigrant  Prescription drug management         BMI:   Estimated body mass index is 27.16 kg/m  as calculated  "from the following:    Height as of 3/30/21: 5' 4\" (1.626 m).    Weight as of this encounter: 158 lb 4 oz (71.8 kg).       No follow-ups on file.    Max Mart MD  [facility]    Yang Newton is 60 y.o. and presents today for the following health issues   HPI     This patient had a follow-up at the [facility].    Patient had evaluation at the emergency room on 3/12/2021 with hepatic steatosis noted on abdominal and pelvic CT was felt to have some constipation.    Labs emergency room BMP normal CBC normal urine normal    There was some hepatic steatosis noted on CT scan done then.    Patient has reflux as well as constipation is also had a history of adenomatous colon polyps I reviewed colonoscopy from 6/2017 due again 6/2022    He said some low back pain he does have a back brace now and is on gabapentin his x-rays showed some degenerative changes no radicular component    He does see rheumatology and is on Plaquenil for his polyarthralgia.    I reviewed medications he needed refills please see above.    He was given some oxycodone for severe pain but is not taking that very often he only got 10 tablets.        Review of Systems  10 point review of systems positive as outlined above otherwise negative      Objective    /65 (Patient Site: Left Arm, Patient Position: Sitting, Cuff Size: Adult Regular)   Pulse 82   Temp 98.1  F (36.7  C) (Other)   Wt 158 lb 4 oz (71.8 kg)   BMI 27.16 kg/m    Body mass index is 27.16 kg/m .  Physical Exam  General appearance no acute distress    HEENT no scleral icterus oropharynx clear    Lungs: Nonlabored breathing lungs are clear    Heart: Normal S1-S2 no murmur    Abdomen presently soft no bloating.  He has been feeling better since he is changed diet and using the MiraLAX and docusate    Med refills as outlined above he does need a refill on his Carafate as well.    Refill on vitamin D    Extremities without edema.  " Lower back with some localized discomfort in the lumbar area but not a lot of radicular pain presently.

## 2021-06-16 NOTE — PROGRESS NOTES
Assessment:   Nesha Newton is a 60 y.o. y.o. male with past medical history significant for vitamin D deficiency, GERD, hepatic steatosis, chronic gastritis, right carpal tunnel syndrome, inflammatory polyarthropathy of hand (on Plaquenil, follows with Dr. Banks) who presents today for follow-up regarding acute on chronic left low back pain.  Patient previously had left lower extremity paresthesias which have resolved.  My review of the CT abdomen and pelvis showed moderate disc degeneration L5-S1.  There is facet arthropathy and suggestion of bilateral foraminal stenosis at this level.  Condition is improved.  Patient has had four sessions of physical therapy with 75% improvement in his pain.       Plan:     A shared decision making plan was used.  The patient's values and choices were respected.  The following represents what was discussed and decided upon by the physician assistant and the patient.  A telephone  was used for the visit.    1.  DIAGNOSTIC TESTS: I reviewed the CT abdomen and pelvis.  No further diagnostic tests were ordered.  Patient is making very good progress.  He is neurologically intact.  He does not have any red flags.  If symptoms fail to improve with conservative measures would recommend an MRI lumbar spine as the next step.    2.  PHYSICAL THERAPY: Patient is currently in physical therapy.  He is doing the MedX program.  He has had 7 session so far.  He is doing his home exercises.  He has additional sessions scheduled.  I encouraged him to continue with physical therapy and the home exercises.    3.  MEDICATIONS:    -I refilled the patient's lidocaine ointment.  This hss been very helpful.  -Patient will continue gabapentin 100 mg 3 times daily.  -Patient can continue Tylenol and ibuprofen as needed.  -He has no longer taking oxycodone.    4.  INTERVENTIONS:  No interventions were ordered.  Patient is making very good progress with physical therapy.  If pain were to return  and not respond to conservative treatment we could consider interventional pain management.  Would likely begin with left L4-5, L5-S1 facet joint injections.      5.  PATIENT EDUCATION: Patient is in agreement the above plan.  All questions were answered.    6.  FOLLOW-UP: Patient will follow up with me in 4 weeks to continue to monitor progress with physical therapy.  If he has questions or concerns in the meantime, he should not hesitate to call.    Subjective:     Nesha Newton is a 60 y.o. male who presents today for follow-up regarding an acute flare of chronic left low back pain with intermittent left lower extremity numbness and tingling.  I last saw the patient March 30, 2021.  At that time he reported improvement in his pain.  I recommended physical therapy.  Patient has had 7 sessions of physical therapy.  He reports 75% improvement in his pain.    Patient complains of more mild residual left low back pain.  He denies any pain radiating to the buttocks or down the legs.  Denies any numbness, tingling, or weakness down the legs.  He rates his pain today as a 3 out of 10.  Pain is aggravated bending and alleviated with walking.  He denies any new symptoms since he was last seen.  Denies loss of bowel or bladder control.  Denies recent fevers.    Patient has had 7 session of physical therapy.  He is doing his home exercises.  He is taking gabapentin 100 mg 3 times daily.  He uses Tylenol and ibuprofen as needed.  He is using lidocaine ointment which has been very helpful.  He is no longer using oxycodone.    Review of Systems:  Positive for pain much worse at night.  Negative for numbness/tingling, weakness, loss of bowel/bladder control, inability to urinate, headache, trip/stumble/falls, difficulty swallowing, difficulty with hand skills, fevers, unintentional weight loss.     Objective:   CONSTITUTIONAL:  Vital signs as above.  No acute distress.  The patient is well nourished and well groomed.     PSYCHIATRIC:  The patient is awake, alert, oriented to person, place and time.  The patient is answering questions appropriately with clear speech.  Normal affect.  HEENT: Normocephalic, atraumatic.  Sclera clear.    SKIN:  Skin over the face, posterior torso, bilateral upper and lower extremities is clean, dry, intact without rashes.  VASCULAR: No significant lower extremity edema.  MUSCULOSKELETAL:  Gait is non-antalgic.  The patient is able to heel and toe walk without any difficulty.  Mild tenderness palpation left lower lumbar paraspinous muscles L4-5 L5-S1.  Lumbar flexion is full but patient reports increased low back pain at end lumbar flexion.  Lumbar extension within normal limits.    The patient has 5/5 strength for the bilateral hip flexors, knee flexors/extensors, ankle dorsiflexors/plantar flexors, ankle evertors/invertors.    NEUROLOGICAL: 1+ bilateral patellar and 2+ bilateral Achilles reflexes.  No ankle clonus bilaterally.  Sensation to light touch is intact in the bilateral L4, L5, and S1 dermatomes.       RESULTS:  I reviewed a CT abdomen and pelvis from [facility] dated March 12, 2021.  This shows moderate disc degeneration L5-S1.  There is mild appearing facet arthropathy L5-S1.  There is suggestion of bilateral foraminal stenosis at this level.

## 2021-06-16 NOTE — TELEPHONE ENCOUNTER
Per [facility]: Need For Clarification; WE HAVE ACETAMINOPHEN ALLERGY ON PROFILE. Please Advise.

## 2021-06-16 NOTE — PROGRESS NOTES
[facility] Rehabilitation Daily Progress     Patient Name: Nesha Newton  Date: 4/14/2021  Visit #: 5/16 MEDX  Auth: thru 6/25  Referral Diagnosis: low back pain  Referring provider: Jazmine Saldana PA-C  Visit Diagnosis:     ICD-10-CM    1. Acute left-sided low back pain without sciatica  M54.5    2. Myofascial pain  M79.18        Assessment:     Patient seen for 4th follow-up of low back pain. He is in the 2nd week of the Medx program. PT increased weight on Medx and patient tolerating appropriately with good reps to fatigue.    Progression of HEP with lumbar/hip, and abdominal strengthening. Patient tolerated well, and will benefit from continued strengthening at home in adjunct to in clinic strength, as he works towards independent management.     The patient had increased tension and tenderness today in lumbar paraspinals and QL. This is overall lessening. Tolerated manual therapy well with reduction in pain. Improved gait following manual therapy as well.     Patient appropriate to continue with skilled PT per POC.       From Eval:  Patient is a 60 y.o. male that presents with signs and symptoms consistent with acute on chronic left sided low back pain secondary to possible QL muscle strain, with history of disc degeneration and possible neural impingement. Patient demonstrates impairments including decreased lumbar flexion with increased pain, + SLUMP on LLE, with myofascial restrictions and TTP of L QL and lumbar paraspinals, leading to impaired functional mobility. Patient's functional limitations include sitting for longer periods of time, sit to stand and rolling over in bed transfers, sleeping comfortably at night without pain and performing daily household chores.      HEP/POC compliance is  good .  Patient demonstrates understanding/independence with home program.  Patient is appropriate to continue with skilled physical therapy intervention, as indicated by initial plan of care.    Goal  Status:  Pt. will be independent with home exercise program in : 6 weeks    Pt will: be able to roll over in bed without increased LLE symptoms or low back pain by 6 weeks.  Pt will: be able to demonstrate improved standing lumbar flexion without difficulty or increased pain to pick something up from the floor by 6 weeks.  Pt will: be able to demosntrate decreased TTP at L distal QL and lumbar paraspinals by 6 weeks.        Plan / Patient Education:     Continue with initial plan of care.  Progress with home program as tolerated.    Plan for next visit: review HEP, Nustep or treadmill warm up, lumbar DE and rotary torso, core strengthening and seated postural review, HF stretching, QL stretching, manual tx as needed     Subjective:     Ipad audio  used today    Reports that his low back is doing much better than previously. Finding the manual therapy beneficial.     Objective:     Treatment Today         Manual Therapy:   Prone-  MFR of L distal QL and lumbar paraspinals - TTP but decreased with treatment    Exercises:  Exercise #1: supine hip roll - 20 reps  Comment #1: SKTC, piriformis and QL stretch - hold 20 sec  Exercise #2: supine hamstring stretch/nerve glide - 20 reps  Comment #2: seated piriformis and hamstring stretching - hold 20 sec  Exercise #3: rotary torso - 90s, starting to L, 32#  Comment #3: Bridge x 10  Exercise #4: TA SLR X 10        Enter Week / Visit #: W2 V2  Weight (lbs): 93#  Reps (#): 16  Time: 115  ROM (degrees): 0-66  Pain: mild pain increase on left.  Flex:Ext ratio: 4.67:1        TREATMENT MINUTES COMMENTS   Evaluation     Self-care/ Home management     Manual therapy 10 See above   Neuromuscular Re-education     Therapeutic Activity     Therapeutic Exercises 16 See above flowsheet   Gait training     Modality__________________     Physical performance testing  Lumbar IM         Total 26    Blank areas are intentional and mean the treatment did not include these items.        Raphael PEREZ, PT  4/14/2021

## 2021-06-16 NOTE — PROGRESS NOTES
Nesha Newton is a 60 y.o. male who is being evaluated via a billable video visit.      How would you like to obtain your AVS? MyChart.  If dropped from the video visit, the video invitation should be resent by: Text to cell phone: [phone]  Will anyone else be joining your video visit? No      Video Start Time: 4:15 pm    Assessment & Plan     Nesha was seen today for follow-up.    Diagnoses and all orders for this visit:    Constipation, unspecified constipation type    Gastroesophageal reflux disease, unspecified whether esophagitis present    Hepatic steatosis    Polyarthralgia    Slow transit constipation  -     docusate sodium (COLACE) 100 MG capsule; 1 po 2x per day as needed for constipation    Other hemorrhoids    Adenomatous polyp of colon, unspecified part of colon    Low back pain, unspecified back pain laterality, unspecified chronicity, unspecified whether sciatica present    Benign prostatic hyperplasia, unspecified whether lower urinary tract symptoms present          This patient has multiple medical conditions I think his main issue is the constipation causing some abdominal pain possibly some low back pain also causing the hemorrhoids to worsen and he has had a little bright red blood on the tissue    He has previous adenomatous colon polyps I reviewed the biopsy report and the colonoscopy report from June 2017, due again June 2020 for colonoscopy.    Please see discussion below regarding increasing treatment as well as dietary changes.    Polyarthralgia has been followed by rheumatology, Dr. Banks    Low back pain reviewed [facility] spine consult from yesterday.    Underlying hepatic steatosis seen again    Enlarged prostate noted on the CT scan from 3/12/2021.  He did have a little trouble with urination out of the gabapentin monitor this persists will need follow-up earlier otherwise we will check a digital exam and check a PSA level when he comes in for physical in May.    As  "discussed        I reviewed the consultation from spine care, normal full on 3/16/2021.  I reviewed the emergency room evaluation on 3/12/2021  Review of external notes as documented in note  Review of the result(s) of each unique test - Review of CT scan from 3/12/2021 as well as labs.Review of previous colonoscopy polyp biopsies from June 2017  Assessment requiring an independent historian(s) - family - Daughter gave independent history  Diagnosis or treatment significantly limited by social determinants of health - Low socioeconomic status non-English-speaking immigrant      31 minutes spent on the date of the encounter doing chart review, history and exam, documentation and further activities as noted above  [id number]}     BMI:   Estimated body mass index is 27.12 kg/m  as calculated from the following:    Height as of 3/16/21: 5' 4\" (1.626 m).    Weight as of 3/16/21: 158 lb (71.7 kg).       Return in about 2 months (around 5/17/2021) for Annual physical.    Max Mart MD  [facility]   Nesha Newton is 60 y.o. and presents today for the following health issues   HPI   This patient had a virtual visit, video, due to the coronavirus pandemic.  He was present as well as his daughter.    Patient has history of inflammatory polyarthropathy in his hand he has had some chronic low back pain.  He was seen in the emergency room for some abdominal pain on 3/12/2021 and then went on to see spine care on 3/16/2021.    CT of his abdomen pelvis showed increased stool fatty liver and mildly enlarged prostate.    He saw Jazmine peoples from spine care yesterday.  Was given 10 tablets of oxycodone also started on gabapentin 100 mg 1 3 times daily    They talked about considering Robaxin consider ISABELLA    Referral for physical therapy and a back brace was given.    Patient tried the gabapentin last night.  He has been constipated and has abdominal pain he has had that history before has a " history of hemorrhoids also history of adenomatous colon polyps in the past.  Had a colonoscopy June 2017.    He had some bright red blood on the tissue when he wiped.    He has been on MiraLAX we discussed increasing fruits and vegetable and water also increasing docusate from 100 mg 1 a day up to 1 twice a day.  If not improving let us know    He is can come in for a physical in May will check prostate then and do a PSA level    I think he is okay to take the gabapentin stay at 100 mg at bedtime and increase to 1 twice a day if he is not having side effects.    No additional concern or issue    Review of Systems  10 point review of systems positive as outlined above otherwise negative      Objective       Vitals:  No vitals were obtained today due to virtual visit.    Physical Exam  General appearance no acute distress    HEENT no nasal congestion or sore throat    Lungs: Nonlabored breathing no wheezing    Abdomen he states he gets some discomfort in the mid abdomen not a lot of epigastric symptoms he does have a history of gastritis in the past denies any reflux    Constipation issues as discussed.    Previous evaluations have shown hemorrhoids.    Extremities without edema    Pain in the lower back no significant radicular component.    No rashes.            Video-Visit Details    Type of service:  Video Visit    Video End Time (time video stopped): 4:35 PM  Originating Location (pt. Location): Home    Distant Location (provider location):  [facility]     Platform used for Video Visit: Tom

## 2021-06-16 NOTE — TELEPHONE ENCOUNTER
Patient has a future F2F appointment on 5/03/2021 with Dr. Mart. Completing task.    Used LL: [id number]

## 2021-06-16 NOTE — PROGRESS NOTES
[facility] Rehabilitation Daily Progress     Patient Name: Nesha Newton  Date: 4/5/2021  Visit #: 2/16 MEDX  Auth: thru 6/25  Referral Diagnosis: low back pain  Referring provider: Jazmine Saldana PA-C  Visit Diagnosis:     ICD-10-CM    1. Acute left-sided low back pain without sciatica  M54.5    2. Myofascial pain  M79.18        Assessment:     Today patient was tested on Lumbar MedX, demonstrates below average strength at all points, especially at end range extension. Patient tolerated lumbar DE and rotary torso, with review of HEP today, leaving with less pain symptoms.     From Eval:  Patient is a 60 y.o. male that presents with signs and symptoms consistent with acute on chronic left sided low back pain secondary to possible QL muscle strain, with history of disc degeneration and possible neural impingement. Patient demonstrates impairments including decreased lumbar flexion with increased pain, + SLUMP on LLE, with myofascial restrictions and TTP of L QL and lumbar paraspinals, leading to impaired functional mobility. Patient's functional limitations include sitting for longer periods of time, sit to stand and rolling over in bed transfers, sleeping comfortably at night without pain and performing daily household chores.      HEP/POC compliance is  good .  Patient demonstrates understanding/independence with home program.  Patient is appropriate to continue with skilled physical therapy intervention, as indicated by initial plan of care.    Goal Status:  Pt. will be independent with home exercise program in : 6 weeks    Pt will: be able to roll over in bed without increased LLE symptoms or low back pain by 6 weeks.  Pt will: be able to demonstrate improved standing lumbar flexion without difficulty or increased pain to pick something up from the floor by 6 weeks.  Pt will: be able to demosntrate decreased TTP at L distal QL and lumbar paraspinals by 6 weeks.        Plan / Patient Education:      Continue with initial plan of care.  Progress with home program as tolerated.    Plan for next visit: review HEP, Nustep or treadmill warm up, initiate lumbar IM DE and rotary torso, bridging, core strengthening and seated postural review, HF stretching, QL stretching, manual tx as needed - found relief at first visit    Subjective:     Ipad audio  used today    Patient reports still a little bit better with pain, but still having pain in the morning times. 5/10 pain today      Objective:       Treatment Today         Manual Therapy: NOT TODAY  SL on R - MFR of L distal QL and lumbar paraspinals - TTP but decreased with treatment    Exercises:  Exercise #1: supine hip roll - 20 reps  Comment #1: SKTC, piriformis and QL stretch - hold 20 sec  Exercise #2: supine hamstring stretch/nerve glide - 20 reps  Comment #2: seated piriformis and hamstring stretching - hold 20 sec  Exercise #3: rotary torso - 90s, starting to L, 30#        Enter Week / Visit #: W1V1  Weight (lbs): 100# ex, 229#  Reps (#): 8 - dec weight next visit  Time: 115s  ROM (degrees): 0-66  Pain: hotness in low back, some pain with pushing  Flex:Ext ratio: 4.67:1        TREATMENT MINUTES COMMENTS   Evaluation     Self-care/ Home management     Manual therapy     Neuromuscular Re-education     Therapeutic Activity     Therapeutic Exercises 20 See above flowsheet   Gait training     Modality__________________     Physical performance testing 10 Lumbar IM         Total 30    Blank areas are intentional and mean the treatment did not include these items.       Yuliana Shaffer, PT  4/5/2021

## 2021-06-16 NOTE — PROGRESS NOTES
[facility] Rehabilitation Daily Progress     Patient Name: Nesha Newton  Date: 4/23/2021  Visit #: 7/16 MEDX  Auth: thru 6/25  Referral Diagnosis: low back pain  Referring provider: Jazmine Saldana PA-C  Visit Diagnosis:     ICD-10-CM    1. Acute left-sided low back pain without sciatica  M54.5    2. Myofascial pain  M79.18        Assessment:     Patient seen for 6th follow-up of low back pain. He is in the 3rd week of the Medx program. PT kept weight on Medx the same today as he was challenged by this last session. He did have improved repetitions, though still difficult. Would benefit from keeping weight the same next session to build strength and endurance at this weight.    Patient tolerating home exercises well, and will benefit from continued strengthening at home in adjunct to in clinic strength, as he works towards independent management. Will shift to 1X/week next week.    The patient has mildly increased tension and tenderness today in lumbar paraspinals and QL. This is overall lessening. Tolerated manual therapy well with reduction in pain. Improved gait following manual therapy as well.     Patient appropriate to continue with skilled PT per POC.       From Eval:  Patient is a 60 y.o. male that presents with signs and symptoms consistent with acute on chronic left sided low back pain secondary to possible QL muscle strain, with history of disc degeneration and possible neural impingement. Patient demonstrates impairments including decreased lumbar flexion with increased pain, + SLUMP on LLE, with myofascial restrictions and TTP of L QL and lumbar paraspinals, leading to impaired functional mobility. Patient's functional limitations include sitting for longer periods of time, sit to stand and rolling over in bed transfers, sleeping comfortably at night without pain and performing daily household chores.      HEP/POC compliance is  good .  Patient demonstrates understanding/independence with  home program.  Patient is appropriate to continue with skilled physical therapy intervention, as indicated by initial plan of care.    Goal Status:  Pt. will be independent with home exercise program in : 6 weeks    Pt will: be able to roll over in bed without increased LLE symptoms or low back pain by 6 weeks.  Pt will: be able to demonstrate improved standing lumbar flexion without difficulty or increased pain to pick something up from the floor by 6 weeks.  Pt will: be able to demosntrate decreased TTP at L distal QL and lumbar paraspinals by 6 weeks.        Plan / Patient Education:     Continue with initial plan of care.  Progress with home program as tolerated.    Plan for next visit: review HEP, Nustep or treadmill warm up, lumbar DE and rotary torso, core strengthening and seated postural review, HF stretching, QL stretching, manual tx as needed     Subjective:     Ipad audio  used today    He is feeling that everything is much better. Doing his exercises at home regularly.    Objective:     Treatment Today         Manual Therapy:   Prone-  MFR of L distal QL and lumbar paraspinals - TTP but decreased with treatment    Exercises:  Exercise #1: supine hip roll - 20 reps  Comment #1: SKTC, piriformis and QL stretch - hold 20 sec  Exercise #2: supine hamstring stretch/nerve glide - 20 reps  Comment #2: seated piriformis and hamstring stretching - hold 20 sec  Exercise #3: rotary torso - 90s, starting to L, 32#  Comment #3: Bridge x 10  Exercise #4: TA SLR X 10        Enter Week / Visit #: W3 V2  Weight (lbs): 99#  Reps (#): 13 (difficult today)  Time: 85  ROM (degrees): 0-66  Pain: Just tight  Flex:Ext ratio: 4.67:1        TREATMENT MINUTES COMMENTS   Evaluation     Self-care/ Home management     Manual therapy 10 See above   Neuromuscular Re-education     Therapeutic Activity     Therapeutic Exercises 14 See above flowsheet   Gait training     Modality__________________     Physical performance  testing  Lumbar IM         Total 24    Blank areas are intentional and mean the treatment did not include these items.       Raphael PEREZ, PT  4/23/2021

## 2021-06-16 NOTE — PROGRESS NOTES
Subjective: This patient comes in for evaluation this is a 57-year-old male he sees rheumatology he is on Actonel for his inflammatory polyarthropathy.  He needs an eye exam for that in addition he has had some decreased visual acuity.  I gave him a referral    He is now on diclofenac it seems to bother his stomach.  He has some Pepcid in the past he been on omeprazole I did restart that today and gave him a prescription.    Otherwise denies additional issues or problems.    His inflammation through the hands and other joints seems to be stable.    Other than the decreased vision denies any additional problems    Tobacco status: He  reports that he has never smoked. He has never used smokeless tobacco.    Patient Active Problem List    Diagnosis Date Noted     Adenomatous colon polyp 06/06/2017     Inflammatory polyarthropathy of hand 05/03/2017     Hepatic steatosis 04/24/2017     Polyarthralgia 04/05/2017     Right upper quadrant abdominal pain 12/20/2016     GERD (gastroesophageal reflux disease)      Coccidioidomycosis      Vitamin D Deficiency      Osteoarthritis Of The Knee      Chronic Eosinophilic Pneumonia        Current Outpatient Prescriptions   Medication Sig Dispense Refill     aspirin 81 MG EC tablet Take 1 tablet (81 mg total) by mouth daily. 90 tablet 3     cholecalciferol, vitamin D3, 2,000 unit Tab 1 po qd 30 tablet 11     diclofenac (VOLTAREN) 75 MG EC tablet Take 1 tablet (75 mg total) by mouth 2 (two) times a day. 60 tablet 1     docusate sodium (COLACE) 100 MG capsule Take 1 capsule (100 mg total) by mouth daily as needed for constipation. 30 capsule 6     famotidine (PEPCID) 20 MG tablet Take 1 tablet (20 mg total) by mouth 2 (two) times a day. 60 tablet 5     hydroxychloroquine (PLAQUENIL) 200 mg tablet TAKE ONE TABLET BY MOUTH TWICE DAILY 180 tablet 0     omeprazole (PRILOSEC) 20 MG capsule Take 1 capsule (20 mg total) by mouth daily. 30 capsule 3     polyethylene glycol (GLYCOLAX) 17  gram/dose powder Take 17 g by mouth daily. 255 g 6     No current facility-administered medications for this visit.        ROS:   10 point review of systems negative other than as outlined above    Objective:    /76 (Patient Site: Right Arm, Patient Position: Sitting, Cuff Size: Adult Large)  Pulse 75  Resp 16  Wt 163 lb (73.9 kg)  SpO2 95%  BMI 25.53 kg/m2  Body mass index is 25.53 kg/(m^2).      General appearance no acute distress    Pupils react normally extraocular movements are full    Lungs are clear no rales or rhonchi heart regular S1-S2    Abdomen soft bowel sounds normal no guarding or rebound    No active synovitis noted.        Assessment:  1. Polyarthralgia     2. Gastroesophageal reflux disease, esophagitis presence not specified  omeprazole (PRILOSEC) 20 MG capsule   3. Decreased vision  Ambulatory referral to Ophthalmology     As discussed above patients on the Plaquenil, see ophthalmology for surveillance regarding that as well as his decreased visual acuity    Omeprazole prescribed regarding gastritis/reflux.    Plan: As outlined above    This transcription uses voice recognition software, which may contain typographical errors.

## 2021-06-16 NOTE — PROGRESS NOTES
[facility] Rehabilitation Daily Progress     Patient Name: Nesha Newton  Date: 4/7/2021  Visit #: 3/16 MEDX  Auth: thru 6/25  Referral Diagnosis: low back pain  Referring provider: Jazmine Saldana PA-C  Visit Diagnosis:     ICD-10-CM    1. Acute left-sided low back pain without sciatica  M54.5    2. Myofascial pain  M79.18        Assessment:     Patient seen for 2nd follow-up of low back pain. He has completed the 1st week of the Medx program. PT decreased weight on Medx due to low repetitions to fatigue in previous session. He had improved tolerance with appropriate reps to fatigue today.     The patient had increased tension and tenderness today in lumbar paraspinals and QL. Tolerated manual therapy well with reduction in pain. Improved gait following manual therapy as well.     Patient appropriate to continue with skilled PT per POC.       From Eval:  Patient is a 60 y.o. male that presents with signs and symptoms consistent with acute on chronic left sided low back pain secondary to possible QL muscle strain, with history of disc degeneration and possible neural impingement. Patient demonstrates impairments including decreased lumbar flexion with increased pain, + SLUMP on LLE, with myofascial restrictions and TTP of L QL and lumbar paraspinals, leading to impaired functional mobility. Patient's functional limitations include sitting for longer periods of time, sit to stand and rolling over in bed transfers, sleeping comfortably at night without pain and performing daily household chores.      HEP/POC compliance is  good .  Patient demonstrates understanding/independence with home program.  Patient is appropriate to continue with skilled physical therapy intervention, as indicated by initial plan of care.    Goal Status:  Pt. will be independent with home exercise program in : 6 weeks    Pt will: be able to roll over in bed without increased LLE symptoms or low back pain by 6 weeks.  Pt will: be  able to demonstrate improved standing lumbar flexion without difficulty or increased pain to pick something up from the floor by 6 weeks.  Pt will: be able to demosntrate decreased TTP at L distal QL and lumbar paraspinals by 6 weeks.        Plan / Patient Education:     Continue with initial plan of care.  Progress with home program as tolerated.    Plan for next visit: review HEP, Nustep or treadmill warm up, lumbar DE and rotary torso, bridging, core strengthening and seated postural review, HF stretching, QL stretching, manual tx as needed     Subjective:     Ipad audio  used today    Reports that the pain is about the same as last time, mornings are still difficult, less pain during the day. Last time felt better after doing the machine workout.     Objective:       Treatment Today         Manual Therapy:   Prone-  MFR of L distal QL and lumbar paraspinals - TTP but decreased with treatment    Exercises:  Exercise #1: supine hip roll - 20 reps  Comment #1: SKTC, piriformis and QL stretch - hold 20 sec  Exercise #2: supine hamstring stretch/nerve glide - 20 reps  Comment #2: seated piriformis and hamstring stretching - hold 20 sec  Exercise #3: rotary torso - 90s, starting to L, 30#        Enter Week / Visit #: W1V2  Weight (lbs): 90#  Reps (#): 16  Time: 140  ROM (degrees): 0-66  Pain: mild pain increase on left.  Flex:Ext ratio: 4.67:1        TREATMENT MINUTES COMMENTS   Evaluation     Self-care/ Home management     Manual therapy 10 See above   Neuromuscular Re-education     Therapeutic Activity     Therapeutic Exercises 20 See above flowsheet   Gait training     Modality__________________     Physical performance testing  Lumbar IM         Total 30    Blank areas are intentional and mean the treatment did not include these items.       Raphael PEREZ, PT  4/7/2021

## 2021-06-16 NOTE — TELEPHONE ENCOUNTER
Pt had a virtual visit with Dr. Mart today and per him, the pt needs a physical with him in clinic in early May. Please call the pt for an appt. Thanks.

## 2021-06-16 NOTE — PROGRESS NOTES
[facility] Rehabilitation Daily Progress     Patient Name: Nesha Newton  Date: 4/12/2021  Visit #: 4/16 MEDX  Auth: thru 6/25  Referral Diagnosis: low back pain  Referring provider: Jazmine Saldana PA-C  Visit Diagnosis:     ICD-10-CM    1. Acute left-sided low back pain without sciatica  M54.5    2. Myofascial pain  M79.18        Assessment:     Patient seen for 3rd follow-up of low back pain. He is in the 2nd week of the Medx program. PT increased weight on Medx and patient tolerating appropriately with good reps to fatigue.     The patient had increased tension and tenderness today in lumbar paraspinals and QL. Tolerated manual therapy well with reduction in pain. Improved gait following manual therapy as well. Would benefit from progression of HEP next session with some core strengthening.     Patient appropriate to continue with skilled PT per POC.       From Eval:  Patient is a 60 y.o. male that presents with signs and symptoms consistent with acute on chronic left sided low back pain secondary to possible QL muscle strain, with history of disc degeneration and possible neural impingement. Patient demonstrates impairments including decreased lumbar flexion with increased pain, + SLUMP on LLE, with myofascial restrictions and TTP of L QL and lumbar paraspinals, leading to impaired functional mobility. Patient's functional limitations include sitting for longer periods of time, sit to stand and rolling over in bed transfers, sleeping comfortably at night without pain and performing daily household chores.      HEP/POC compliance is  good .  Patient demonstrates understanding/independence with home program.  Patient is appropriate to continue with skilled physical therapy intervention, as indicated by initial plan of care.    Goal Status:  Pt. will be independent with home exercise program in : 6 weeks    Pt will: be able to roll over in bed without increased LLE symptoms or low back pain by 6  weeks.  Pt will: be able to demonstrate improved standing lumbar flexion without difficulty or increased pain to pick something up from the floor by 6 weeks.  Pt will: be able to demosntrate decreased TTP at L distal QL and lumbar paraspinals by 6 weeks.        Plan / Patient Education:     Continue with initial plan of care.  Progress with home program as tolerated.    Plan for next visit: review HEP, Nustep or treadmill warm up, lumbar DE and rotary torso, bridging, core strengthening and seated postural review, HF stretching, QL stretching, manual tx as needed     Subjective:     Ipad audio  used today    Feeling much better than the day before yesterday. Feels that the PT is helping him a lot, would like to do the manual therapy again today.     Objective:     Treatment Today         Manual Therapy:   Prone-  MFR of L distal QL and lumbar paraspinals - TTP but decreased with treatment    Exercises:  Exercise #1: supine hip roll - 20 reps  Comment #1: SKTC, piriformis and QL stretch - hold 20 sec  Exercise #2: supine hamstring stretch/nerve glide - 20 reps  Comment #2: seated piriformis and hamstring stretching - hold 20 sec  Exercise #3: rotary torso - 90s, starting to L, 30#        Enter Week / Visit #: W1V2  Weight (lbs): 90#  Reps (#): 16  Time: 140  ROM (degrees): 0-66  Pain: mild pain increase on left.  Flex:Ext ratio: 4.67:1        TREATMENT MINUTES COMMENTS   Evaluation     Self-care/ Home management     Manual therapy 10 See above   Neuromuscular Re-education     Therapeutic Activity     Therapeutic Exercises 15 See above flowsheet   Gait training     Modality__________________     Physical performance testing  Lumbar IM         Total 25    Blank areas are intentional and mean the treatment did not include these items.       Raphael PEREZ, PT  4/12/2021

## 2021-06-16 NOTE — PROGRESS NOTES
[facility] Rehabilitation Daily Progress     Patient Name: Nesha Newton  Date: 4/21/2021  Visit #: 6/16 MEDX  Auth: thru 6/25  Referral Diagnosis: low back pain  Referring provider: Jazmine Saldana PA-C  Visit Diagnosis:     ICD-10-CM    1. Acute left-sided low back pain without sciatica  M54.5    2. Myofascial pain  M79.18        Assessment:     Patient seen for 5th follow-up of low back pain. He is in the 3rd week of the Medx program. PT increased weight on Medx and patient tolerating appropriately, but did have low reps to fatigue today. Would benefit from keeping weight the same next session to build strength and endurance at this weight.    Progression of HEP with lumbar/hip, and abdominal strengthening. Patient tolerated well, and will benefit from continued strengthening at home in adjunct to in clinic strength, as he works towards independent management.     The patient had increased tension and tenderness today in lumbar paraspinals and QL. This is overall lessening. Tolerated manual therapy well with reduction in pain. Improved gait following manual therapy as well.     Patient appropriate to continue with skilled PT per POC.       From Eval:  Patient is a 60 y.o. male that presents with signs and symptoms consistent with acute on chronic left sided low back pain secondary to possible QL muscle strain, with history of disc degeneration and possible neural impingement. Patient demonstrates impairments including decreased lumbar flexion with increased pain, + SLUMP on LLE, with myofascial restrictions and TTP of L QL and lumbar paraspinals, leading to impaired functional mobility. Patient's functional limitations include sitting for longer periods of time, sit to stand and rolling over in bed transfers, sleeping comfortably at night without pain and performing daily household chores.      HEP/POC compliance is  good .  Patient demonstrates understanding/independence with home program.  Patient  is appropriate to continue with skilled physical therapy intervention, as indicated by initial plan of care.    Goal Status:  Pt. will be independent with home exercise program in : 6 weeks    Pt will: be able to roll over in bed without increased LLE symptoms or low back pain by 6 weeks.  Pt will: be able to demonstrate improved standing lumbar flexion without difficulty or increased pain to pick something up from the floor by 6 weeks.  Pt will: be able to demosntrate decreased TTP at L distal QL and lumbar paraspinals by 6 weeks.        Plan / Patient Education:     Continue with initial plan of care.  Progress with home program as tolerated.    Plan for next visit: review HEP, Nustep or treadmill warm up, lumbar DE and rotary torso, core strengthening and seated postural review, HF stretching, QL stretching, manual tx as needed     Subjective:     Ipad audio  used today    Feels that he is still doing much better.    Objective:     Treatment Today         Manual Therapy:   Prone-  MFR of L distal QL and lumbar paraspinals - TTP but decreased with treatment    Exercises:  Exercise #1: supine hip roll - 20 reps  Comment #1: SKTC, piriformis and QL stretch - hold 20 sec  Exercise #2: supine hamstring stretch/nerve glide - 20 reps  Comment #2: seated piriformis and hamstring stretching - hold 20 sec  Exercise #3: rotary torso - 90s, starting to L, 32#  Comment #3: Bridge x 10  Exercise #4: TA SLR X 10        Enter Week / Visit #: W3 V1  Weight (lbs): 99#  Reps (#): 11 (difficult today)  Time: 85  ROM (degrees): 0-66  Pain: Just tight  Flex:Ext ratio: 4.67:1        TREATMENT MINUTES COMMENTS   Evaluation     Self-care/ Home management     Manual therapy 10 See above   Neuromuscular Re-education     Therapeutic Activity     Therapeutic Exercises 15 See above flowsheet   Gait training     Modality__________________     Physical performance testing  Lumbar IM         Total 25    Blank areas are intentional and  mean the treatment did not include these items.       Raphael PEREZ, PT  4/21/2021

## 2021-06-17 NOTE — TELEPHONE ENCOUNTER
Telephone Encounter by Rosa Isela Hill at 4/14/2020 12:10 PM     Author: Rosa Isela Hill Service: -- Author Type: --    Filed: 4/14/2020 12:26 PM Encounter Date: 4/13/2020 Status: Signed    : Rosa Isela Hill       PRIOR AUTHORIZATION DENIED    Denial Rational: Medication is excluded from coverage.  Alternatives include:  Hydrocortisone 1% and 2.5% rectal cream,

## 2021-06-17 NOTE — PROGRESS NOTES
Assessment:   Nesha Newton is a 60 y.o. y.o. male with past medical history significant for vitamin D deficiency, GERD, hepatic steatosis, chronic gastritis, right carpal tunnel syndrome, inflammatory polyarthropathy of hand (on Plaquenil, follows with Dr. Banks) who presents today for follow-up regarding acute on chronic left low back pain.  Patient previously had left lower extremity paresthesias which have resolved.  My review of the CT abdomen and pelvis showed moderate disc degeneration L5-S1.  There is facet arthropathy and suggestion of bilateral foraminal stenosis at this level.  Condition is improved.  Patient has had 11 sessions of physical therapy with 90 to 95% improvement in his pain.       Plan:     A shared decision making plan was used.  The patient's values and choices were respected.  The following represents what was discussed and decided upon by the physician assistant and the patient.  A telephone  was used for the visit.    1.  DIAGNOSTIC TESTS: I reviewed the CT abdomen and pelvis.  No further diagnostic tests were ordered.      2.  PHYSICAL THERAPY: Patient completed 11 sessions of medics physical therapy May 20, 2021.  He has met physical therapy goals and will trial independence.  No further physical therapy was ordered.    3.  MEDICATIONS:  -I refill the patient's gabapentin 100 mg 3 times daily.  He states this is helpful for his pain.  Denies side effects.  -Patient can also use Tylenol as needed.    4.  INTERVENTIONS:  No interventions were ordered.  Patient has had excellent improvement in his pain with physical therapy.      5.  PATIENT EDUCATION: Patient is in agreement the above plan.  All questions were answered.    6.  FOLLOW-UP:Patient will follow up with me as needed.  If he has questions or concerns, he should not hesitate to call.      Subjective:     Nesha Newton is a 60 y.o. male who presents today for follow-up regarding an acute flare of chronic left  low back pain with intermittent left lower extremity numbness and tingling.  I last saw the patient April 26, 2021.  At that time he reported significant improvement in his pain after several sessions of physical therapy.  I recommended he continue with the physical therapy.  He has now had 11 sessions.  Patient reports 90 to 95% improvement in his pain.    Patient complains of minimal residual left low back pain.  He denies any pain radiating down the leg.  Denies any numbness, tingling, weakness.  He rates his pain today as a 2 out of 10.  Pain is aggravated with bending.  Pain is alleviated with walking.  He denies any new symptoms since he was last seen.    Patient completed 11 sessions of physical therapy May 20, 2021.  He is doing his home exercises.  He is taking gabapentin 100 mg 3 times daily.  He states this is helpful and denies side effects.  He requested refill.  He also uses Tylenol and ibuprofen as needed.    Review of Systems:  Positive for pain much worse at night.  Negative for numbness/tingling, weakness, loss of bowel/bladder control, inability to urinate, headache, trip/stumble/falls, difficulty swallowing, difficulty with hand skills, fevers, unintentional weight loss.     Objective:   CONSTITUTIONAL:  Vital signs as above.  No acute distress.  The patient is well nourished and well groomed.    PSYCHIATRIC:  The patient is awake, alert, oriented to person, place and time.  The patient is answering questions appropriately with clear speech.  Normal affect.  HEENT: Normocephalic, atraumatic.  Sclera clear.    SKIN:  Skin over the face, posterior torso, bilateral upper and lower extremities is clean, dry, intact without rashes.  VASCULAR: No significant lower extremity edema.  MUSCULOSKELETAL:  Gait is non-antalgic.  The patient is able to heel and toe walk without any difficulty.  No tenderness palpation left lower lumbar paraspinous muscles L4-5 L5-S1.  The patient has 5/5 strength for the  bilateral hip flexors, knee flexors/extensors, ankle dorsiflexors/plantar flexors, ankle evertors/invertors.    NEUROLOGICAL:  No ankle clonus bilaterally.  Sensation to light touch is intact in the bilateral L4, L5, and S1 dermatomes.       RESULTS:  I reviewed a CT abdomen and pelvis from [facility] dated March 12, 2021.  This shows moderate disc degeneration L5-S1.  There is mild appearing facet arthropathy L5-S1.  There is suggestion of bilateral foraminal stenosis at this level.

## 2021-06-17 NOTE — PROGRESS NOTES
[facility] Rehabilitation Daily Progress     Patient Name: Nesha Newton  Date: 5/6/2021  Visit #: 9/16 MEDX  Auth: thru 6/25  Referral Diagnosis: low back pain  Referring provider: Jazmine Saldana PA-C  Visit Diagnosis:     ICD-10-CM    1. Acute left-sided low back pain without sciatica  M54.5    2. Myofascial pain  M79.18        Assessment:     Patient seen for 8th follow-up of low back pain. He is in the 5th week of the Medx program, retesting demonstrates improvements at all points, however still below average.     . PT kept weight on Medx the same today as he was challenged by this last session. . Would benefit from keeping weight the same next session to build strength and endurance at this weight.    Patient tolerating home exercises well, and will benefit from continued strengthening at home in adjunct to in clinic strength, as he works towards independent management. Will shift to 1X/week next week.    The patient has mildly increased tension and tenderness today in lumbar paraspinals and QL. This is overall lessening. Tolerated manual therapy well with reduction in pain. Improved gait following manual therapy as well.     Patient appropriate to continue with skilled PT per POC.       From Eval:  Patient is a 60 y.o. male that presents with signs and symptoms consistent with acute on chronic left sided low back pain secondary to possible QL muscle strain, with history of disc degeneration and possible neural impingement. Patient demonstrates impairments including decreased lumbar flexion with increased pain, + SLUMP on LLE, with myofascial restrictions and TTP of L QL and lumbar paraspinals, leading to impaired functional mobility. Patient's functional limitations include sitting for longer periods of time, sit to stand and rolling over in bed transfers, sleeping comfortably at night without pain and performing daily household chores.      HEP/POC compliance is  good .  Patient demonstrates  understanding/independence with home program.  Patient is appropriate to continue with skilled physical therapy intervention, as indicated by initial plan of care.    Goal Status:  Pt. will be independent with home exercise program in : 6 weeks    Pt will: be able to roll over in bed without increased LLE symptoms or low back pain by 6 weeks.  Pt will: be able to demonstrate improved standing lumbar flexion without difficulty or increased pain to pick something up from the floor by 6 weeks.  Pt will: be able to demosntrate decreased TTP at L distal QL and lumbar paraspinals by 6 weeks.        Plan / Patient Education:     Continue with initial plan of care.  Progress with home program as tolerated.    Plan for next visit: review HEP, Nustep or treadmill warm up, lumbar DE and rotary torso, core strengthening and seated postural review, HF stretching, QL stretching, manual tx as needed     Subjective:     Ipad audio  used today    Patient is feeling pretty good. Patient still reporting pain with walking ad sometimes at night too. Patient walks for 5 minutes before his knee pain starts, bothers him more than back.   Doing his exercises at home regularly and are going okay.     Objective:     Treatment Today         Manual Therapy:   Prone-  MFR of L distal QL and lumbar paraspinals - TTP but decreased with treatment    Exercises:  Exercise #1: supine hip roll - 20 reps  Comment #1: SKTC, piriformis and QL stretch - hold 20 sec  Exercise #2: supine hamstring stretch/nerve glide - 20 reps  Comment #2: seated piriformis and hamstring stretching - hold 20 sec  Exercise #3: rotary torso - 90s, starting to L, 36#  Comment #3: Bridge x 10  Exercise #4: TA SLR X 10  Comment #4: reformer leg press  and bridge x 10 [place]        Enter Week / Visit #: wk 5  Weight (lbs): 247# max, 90# ex - decreased today  Reps (#): 15  Time: 105s  ROM (degrees): 0-66  Pain: Just tight  Flex:Ext ratio: 3.21:1        TREATMENT  MINUTES COMMENTS   Evaluation     Self-care/ Home management     Manual therapy 10 See above   Neuromuscular Re-education     Therapeutic Activity     Therapeutic Exercises 15 See above flowsheet  Nustep 5 minutes   Gait training     Modality__________________     Physical performance testing  Lumbar IM         Total 25    Blank areas are intentional and mean the treatment did not include these items.       Yuliana Shaffer, PT  5/6/2021

## 2021-06-17 NOTE — PROGRESS NOTES
[facility] Rehabilitation Daily Progress     Patient Name: Nesha Newton  Date: 4/29/2021  Visit #: 8/16 MEDX  Auth: thru 6/25  Referral Diagnosis: low back pain  Referring provider: Jazmine Saldana PA-C  Visit Diagnosis:     ICD-10-CM    1. Acute left-sided low back pain without sciatica  M54.5    2. Myofascial pain  M79.18        Assessment:     Patient seen for 7th follow-up of low back pain. He is in the 4th week of the Medx program, retested today demonstrates improvements at all points, however still below average.     . PT kept weight on Medx the same today as he was challenged by this last session. . Would benefit from keeping weight the same next session to build strength and endurance at this weight.    Patient tolerating home exercises well, and will benefit from continued strengthening at home in adjunct to in clinic strength, as he works towards independent management. Will shift to 1X/week next week.    The patient has mildly increased tension and tenderness today in lumbar paraspinals and QL. This is overall lessening. Tolerated manual therapy well with reduction in pain. Improved gait following manual therapy as well.     Patient appropriate to continue with skilled PT per POC.       From Eval:  Patient is a 60 y.o. male that presents with signs and symptoms consistent with acute on chronic left sided low back pain secondary to possible QL muscle strain, with history of disc degeneration and possible neural impingement. Patient demonstrates impairments including decreased lumbar flexion with increased pain, + SLUMP on LLE, with myofascial restrictions and TTP of L QL and lumbar paraspinals, leading to impaired functional mobility. Patient's functional limitations include sitting for longer periods of time, sit to stand and rolling over in bed transfers, sleeping comfortably at night without pain and performing daily household chores.      HEP/POC compliance is  good .  Patient  demonstrates understanding/independence with home program.  Patient is appropriate to continue with skilled physical therapy intervention, as indicated by initial plan of care.    Goal Status:  Pt. will be independent with home exercise program in : 6 weeks    Pt will: be able to roll over in bed without increased LLE symptoms or low back pain by 6 weeks.  Pt will: be able to demonstrate improved standing lumbar flexion without difficulty or increased pain to pick something up from the floor by 6 weeks.  Pt will: be able to demosntrate decreased TTP at L distal QL and lumbar paraspinals by 6 weeks.        Plan / Patient Education:     Continue with initial plan of care.  Progress with home program as tolerated.    Plan for next visit: review HEP, Nustep or treadmill warm up, lumbar DE and rotary torso, core strengthening and seated postural review, HF stretching, QL stretching, manual tx as needed     Subjective:     Ipad audio  used today  Patient feels like things are getting better, mornings are going better.   Doing his exercises at home regularly.    Objective:     Treatment Today         Manual Therapy:   Prone-  MFR of L distal QL and lumbar paraspinals - TTP but decreased with treatment    Exercises:  Exercise #1: supine hip roll - 20 reps  Comment #1: SKTC, piriformis and QL stretch - hold 20 sec  Exercise #2: supine hamstring stretch/nerve glide - 20 reps  Comment #2: seated piriformis and hamstring stretching - hold 20 sec  Exercise #3: rotary torso - 90s, starting to R, 34#  Comment #3: Bridge x 10  Exercise #4: TA SLR X 10  Comment #4: reformer leg press  and bridge x 10 [place]        Enter Week / Visit #: Wk4  Weight (lbs): 247# max, 99# ex  Reps (#): 10  Time: 76s  ROM (degrees): 0-66  Pain: Just tight  Flex:Ext ratio: 3.21:1        TREATMENT MINUTES COMMENTS   Evaluation     Self-care/ Home management     Manual therapy  See above   Neuromuscular Re-education     Therapeutic Activity      Therapeutic Exercises 10 See above flowsheet   Gait training     Modality__________________     Physical performance testing 15 Lumbar IM         Total 25    Blank areas are intentional and mean the treatment did not include these items.       Yuliana Shaffer, PT  4/29/2021

## 2021-06-17 NOTE — PROGRESS NOTES
[facility] Daily Progress     Patient Name: Nesha Newton  Date: 5/13/2021  Visit #: 10/16 MEDX  Auth: thru 6/25  Referral Diagnosis: low back pain  Referring provider: Jazmine Saldana PA-C  Visit Diagnosis:     ICD-10-CM    1. Acute left-sided low back pain without sciatica  M54.5    2. Myofascial pain  M79.18        Assessment:     Patient seen for 9th follow-up of low back pain. He is in the 6th week of the Medx program, retesting demonstrates improvements at all points, however still below average. Patient tolerated addition of knee strengthening HEP today.    . PT kept weight on Medx the same today as he was challenged by this last session. . Would benefit from keeping weight the same next session to build strength and endurance at this weight.    Patient tolerating home exercises well, and will benefit from continued strengthening at home in adjunct to in clinic strength, as he works towards independent management. Will shift to 1X/week next week.    The patient has mildly increased tension and tenderness today in lumbar paraspinals and QL. This is overall lessening. Tolerated manual therapy well with reduction in pain. Improved gait following manual therapy as well.     Patient appropriate to continue with skilled PT per POC.       From Eval:  Patient is a 60 y.o. male that presents with signs and symptoms consistent with acute on chronic left sided low back pain secondary to possible QL muscle strain, with history of disc degeneration and possible neural impingement. Patient demonstrates impairments including decreased lumbar flexion with increased pain, + SLUMP on LLE, with myofascial restrictions and TTP of L QL and lumbar paraspinals, leading to impaired functional mobility. Patient's functional limitations include sitting for longer periods of time, sit to stand and rolling over in bed transfers, sleeping comfortably at night without pain and performing daily household  chores.      HEP/POC compliance is  good .  Patient demonstrates understanding/independence with home program.  Patient is appropriate to continue with skilled physical therapy intervention, as indicated by initial plan of care.    Goal Status:  Pt. will be independent with home exercise program in : 6 weeks    Pt will: be able to roll over in bed without increased LLE symptoms or low back pain by 6 weeks.  Pt will: be able to demonstrate improved standing lumbar flexion without difficulty or increased pain to pick something up from the floor by 6 weeks.  Pt will: be able to demosntrate decreased TTP at L distal QL and lumbar paraspinals by 6 weeks.        Plan / Patient Education:     Continue with initial plan of care.  Progress with home program as tolerated.    Plan for next visit: review HEP, Nustep or treadmill warm up, lumbar DE and rotary torso, core strengthening and seated postural review, HF stretching, QL stretching, manual tx as needed     Subjective:     Ipad audio  used today    Patient doing better than last time. No soreness after last visit. Still getting pain at night and sometimes with walking - more in his knees than his low back.     Doing his exercises at home regularly and are going okay.     Objective:     Treatment Today         Manual Therapy:   Prone-  MFR of L distal QL and lumbar paraspinals - TTP but decreased with treatment    Exercises:  Exercise #1: supine hip roll - 20 reps  Comment #1: SKTC, piriformis and QL stretch - hold 20 sec  Exercise #2: supine hamstring stretch/nerve glide - 20 reps  Comment #2: seated piriformis and hamstring stretching - hold 20 sec  Exercise #3: rotary torso - 90s, starting to R, 38#  Comment #3: Bridge x 10  Exercise #4: TA SLR X 10  Comment #4: reformer leg press  and bridge x 10 all springs  Exercise #5: short arc quad and quad set - hold 5 sec x 10  Comment #5: seated SAQ - hold 5 sec 10        Enter Week / Visit #: wk 6  Weight (lbs):  247# max, 92# ex  Reps (#): 14  Time: 90s  ROM (degrees): 0-66  Pain: Just tight  Flex:Ext ratio: 3.21:1        TREATMENT MINUTES COMMENTS   Evaluation     Self-care/ Home management     Manual therapy  See above   Neuromuscular Re-education     Therapeutic Activity     Therapeutic Exercises 25 See above flowsheet  Nustep 5 minutes   Gait training     Modality__________________     Physical performance testing  Lumbar IM         Total 25    Blank areas are intentional and mean the treatment did not include these items.       Yuliana Shaffer, PT  5/13/2021

## 2021-06-17 NOTE — PROGRESS NOTES
"    Assessment & Plan     Nesha was seen today for annual exam.    Diagnoses and all orders for this visit:    Routine history and physical examination of adult  -     Lipid [place] FASTING    Polyarthralgia  -     ibuprofen (ADVIL,MOTRIN) 600 MG tablet; TAKE 1 TABLET BY MOUTH THREE TIMES DAILY AS NEEDED FOR PAIN    Constipation, unspecified constipation type    Benign prostatic hyperplasia, unspecified whether lower urinary tract symptoms present  -     PSA (Prostatic-Specific Antigen), Diagnostic    Adenomatous polyp of colon, unspecified part of colon    Gastroesophageal reflux disease, unspecified whether esophagitis present    Lower abdominal pain  -     Hepatic Profile  -     Ambulatory referral to Gastroenterology - [facility]    Vitamin D deficiency        Patient has physical today relatively stable    Some chronic constipation issues continue on docusate and polyethylene glycol as well as good diet.    Mild BPH, presently fairly stable    Ongoing left lower quadrant abdominal pain.  Had a colonoscopy June 2017 had a CT scan March 2021 labs have been normal.  We will have him see [facility].    GERD continue on same meds Protonix famotidine as needed sulfate.    Continue vitamin D replacement    Patient be contacted with results of the labs (lipid LFT and PSA)         BMI:   Estimated body mass index is 26.69 kg/m  as calculated from the following:    Height as of 4/26/21: 5' 4\" (1.626 m).    Weight as of this encounter: 155 lb 8 oz (70.5 kg).       Return in about 6 months (around 11/3/2021) for Recheck.    Max Mart MD  [facility]    Subjective   Nesha Newton is 60 y.o. and presents today for the following health issues   HPI   This patient comes in for a physical.  Is a 60-year-old male.    Patient has had some history of constipation as well as BPH symptoms.    Previous colonoscopy in June 2017, he has adenomatous colon polyps due again June 2022.    Patient " continues on vitamin D 2000 units daily.    Has had some significant reflux symptoms but has been stable on his Pepcid Protonix and Carafate.    Continues to see rheumatology and is on hydroxychloroquine.    Patient feels that his joints etc. are stable on the present meds he is on gabapentin    He has had some left-sided abdominal pain for months.    Reviewed his CT scan of abdomen and pelvis on March 12, hepatic steatosis was noted and mild BPH otherwise unremarkable.    Labs then CBC normal BMP normal    Labs today lipid panel LFTs and PSA.        Review of Systems  10 point review of systems positive as outlined above otherwise negative      Objective    /82 (Patient Site: Left Arm, Patient Position: Sitting, Cuff Size: Adult Regular)   Pulse 71   Temp 98.1  F (36.7  C) (Temporal)   Wt 155 lb 8 oz (70.5 kg)   BMI 26.69 kg/m    Body mass index is 26.69 kg/m .  Physical Exam  General appearance no acute distress    HEENT canals and TMs normal oropharynx is clear pupils react normally extraocular moods are full    Neck is nontender no adenopathy    No thyroid fullness    Lungs no rales no rhonchi clear    Heart regular S1-S2 no murmur    Abdomen soft bowel sounds normally describes some discomfort minimally on palpation but does get intermittent pain in the left lower quadrant no mass appreciated should.    Back without CVA pain    Genitalia normal, normal descended testicles, tenderness no evidence of hernia.    Prostate smooth normal size no irregularities.    Labs lipid LFT and PSA pending

## 2021-06-17 NOTE — PROGRESS NOTES
[facility] Daily Progress     Patient Name: Nesha Newton  Date: 5/20/2021  Visit #: 11/16 MEDX  Auth: thru 6/25  Referral Diagnosis: low back pain  Referring provider: Jazmine Saldana PA-C  Visit Diagnosis:     ICD-10-CM    1. Acute left-sided low back pain without sciatica  M54.5    2. Myofascial pain  M79.18        Assessment:     Patient seen for 10th follow-up of low back pain. He is in the 7th week of the Medx program, Patient feels ready to be done with therapy as he has met all goals and not really having increased low back pain, just his knee pain now. Retesting today demonstrates about the same testing as the last 2 retests, however is reporting marked improvements since starting therapy. Patient has met goals and will trial independence.      The patient has mildly increased tension and tenderness today in lumbar paraspinals and QL. This is overall lessening. Tolerated manual therapy well with reduction in pain. Improved gait following manual therapy as well.     Patient appropriate to continue with skilled PT per POC.       From Eval:  Patient is a 60 y.o. male that presents with signs and symptoms consistent with acute on chronic left sided low back pain secondary to possible QL muscle strain, with history of disc degeneration and possible neural impingement. Patient demonstrates impairments including decreased lumbar flexion with increased pain, + SLUMP on LLE, with myofascial restrictions and TTP of L QL and lumbar paraspinals, leading to impaired functional mobility. Patient's functional limitations include sitting for longer periods of time, sit to stand and rolling over in bed transfers, sleeping comfortably at night without pain and performing daily household chores.      HEP/POC compliance is  good .  Patient demonstrates understanding/independence with home program.  Patient is appropriate to continue with skilled physical therapy intervention, as indicated by initial  plan of care.    Goal Status:  Pt. will be independent with home exercise program in : 6 weeks    Pt will: be able to roll over in bed without increased LLE symptoms or low back pain by 6 weeks.  Pt will: be able to demonstrate improved standing lumbar flexion without difficulty or increased pain to pick something up from the floor by 6 weeks.  Pt will: be able to demosntrate decreased TTP at L distal QL and lumbar paraspinals by 6 weeks.        Plan / Patient Education:     Continue with initial plan of care.  Progress with home program as tolerated.    Plan for next visit: review HEP, Nustep or treadmill warm up, lumbar DE and rotary torso, core strengthening and seated postural review, HF stretching, QL stretching, manual tx as needed     Subjective:     Ipad audio  used today    Patient doing better than last time. No soreness after last visit. Still getting pain at night and sometimes with walking - more in his knees than his low back.     Doing his exercises at home regularly and are going okay.     Objective:     Treatment Today         Manual Therapy:   Prone-  MFR of L distal QL and lumbar paraspinals - TTP but decreased with treatment    Exercises:  Exercise #1: supine hip roll - 20 reps  Comment #1: SKTC, piriformis and QL stretch - hold 20 sec  Exercise #2: supine hamstring stretch/nerve glide - 20 reps  Comment #2: seated piriformis and hamstring stretching - hold 20 sec  Exercise #3: rotary torso - 90s, starting to L, 40#  Comment #3: Bridge x 10  Exercise #4: TA SLR X 10  Comment #4: reformer leg press  and bridge x 20 all springs  Exercise #5: short arc quad and quad set - hold 5 sec x 10  Comment #5: seated SAQ - hold 5 sec 10  Exercise #6: reformer lat pull down x 10 2R  Comment #6: reformer hip flexor stretch 2R x 10        Enter Week / Visit #: Wk 7 RETESTING  Weight (lbs): 230# max, 100# ex  Reps (#): 10  Time: 90s  ROM (degrees): 0-66  Pain: Just tight  Flex:Ext ratio:  3.21:1        TREATMENT MINUTES COMMENTS   Evaluation     Self-care/ Home management     Manual therapy  See above   Neuromuscular Re-education     Therapeutic Activity     Therapeutic Exercises 25 See above flowsheet  Nustep 5 minutes   Gait training     Modality__________________     Physical performance testing  Lumbar IM         Total 25    Blank areas are intentional and mean the treatment did not include these items.       Yuliana Shaffer, PT  5/20/2021

## 2021-06-17 NOTE — PROGRESS NOTES
Subjective: Patient comes in for right shoulder problems he has had some history of polyarthropathy.    This is involving the right shoulder he has some pain with external rotation and hyperabduction internal rotation is okay    Denies any specific injury    He has been on some diclofenac from Dr. Moreno    No known injury.    Tobacco status: He  reports that he has never smoked. He has never used smokeless tobacco.    Patient Active Problem List    Diagnosis Date Noted     Primary osteoarthritis of left knee 04/03/2018     Primary osteoarthritis of right knee 04/03/2018     Gastritis 04/03/2018     Adenomatous colon polyp 06/06/2017     Inflammatory polyarthropathy of hand 05/03/2017     Hepatic steatosis 04/24/2017     Polyarthralgia 04/05/2017     Right upper quadrant abdominal pain 12/20/2016     GERD (gastroesophageal reflux disease)      Coccidioidomycosis      Vitamin D Deficiency      Osteoarthritis Of The Knee      Chronic Eosinophilic Pneumonia        Current Outpatient Prescriptions   Medication Sig Dispense Refill     aspirin 81 MG EC tablet Take 1 tablet (81 mg total) by mouth daily. 90 tablet 3     cholecalciferol, vitamin D3, 2,000 unit Tab 1 po qd 30 tablet 11     diclofenac (VOLTAREN) 75 MG EC tablet Take 75 mg by mouth 2 (two) times a day.       docusate sodium (COLACE) 100 MG capsule Take 1 capsule (100 mg total) by mouth daily as needed for constipation. 30 capsule 6     famotidine (PEPCID) 20 MG tablet Take 1 tablet (20 mg total) by mouth 2 (two) times a day. 60 tablet 5     hydroxychloroquine (PLAQUENIL) 200 mg tablet TAKE ONE TABLET BY MOUTH TWICE DAILY 180 tablet 0     omeprazole (PRILOSEC) 20 MG capsule Take 1 capsule (20 mg total) by mouth daily. 30 capsule 3     polyethylene glycol (GLYCOLAX) 17 gram/dose powder Take 17 g by mouth daily. 255 g 6     methylPREDNISolone (MEDROL DOSEPACK) 4 mg tablet follow package directions 21 tablet 0     No current facility-administered medications for  this visit.        ROS:   Review of systems negative other than as outlined above    Objective:    /58 (Patient Site: Left Arm, Patient Position: Sitting, Cuff Size: Adult Regular)  Pulse 79  Temp 97.7  F (36.5  C) (Oral)   Resp 16  Wt 163 lb (73.9 kg)  SpO2 95% Comment: at rest with room air  BMI 25.53 kg/m2  Body mass index is 25.53 kg/(m^2).    General appearance no acute distress    Neck was supple no tenderness through the cervical and rhomboid area    Tenderness through the right shoulder anteriorly and the through the glenohumeral joint    Pain with abduction at about 120  she does have full range of motion though in pain with external rotation.    No significant elbow or wrist pain    No swelling.    Lungs are clear no rales or rhonchi heart was regular S1-S2 rate at 80    No rash, skin was normal    O2 sat looked good.        Assessment:  1. Rotator cuff disorder, right  methylPREDNISolone (MEDROL DOSEPACK) 4 mg tablet   2. Polyarthralgia       Rotator cuff strain.  I discussed range of motion exercises also placed him on Medrol Dosepak    If not improved further workup      Plan: As outlined above continue same other medications including diclofenac    This transcription uses voice recognition software, which may contain typographical errors.

## 2021-06-17 NOTE — PROGRESS NOTES
Subjective: Patient comes in to discuss his joint problems.  He has recently been to [facility] and had bilateral knee injections with cortisone.  He had this done by nurse practitioner.  Dr. Nicholas had injected back on 9/11/2017.  Patient has grade 4 changes of the left knee bone-on-bone and grade 3 changes on the right knee, near bone-on-bone.  He did have a history of left knee arthroscopy in the past question meniscal injury.    He has tricompartmental disease noted on the x-rays.    Also has some polyarthralgia issues that he sees Dr. Banks.  He is on some diclofenac as well as Plaquenil.  He has seen the eye doctor recently.    I discussed his symptoms he does have some pain at rest he may be getting to the point where he needs a knee replacement.    I discussed he could get another cortisone injection at the end of June.  Also could have Synvisc injection.  Did discuss that could be done here as well.    Otherwise patient is doing well med reconciliation reviewed his stomach seems to be stable.    Tobacco status: He  reports that he has never smoked. He has never used smokeless tobacco.    Patient Active Problem List    Diagnosis Date Noted     Primary osteoarthritis of left knee 04/03/2018     Primary osteoarthritis of right knee 04/03/2018     Gastritis 04/03/2018     Adenomatous colon polyp 06/06/2017     Inflammatory polyarthropathy of hand 05/03/2017     Hepatic steatosis 04/24/2017     Polyarthralgia 04/05/2017     Right upper quadrant abdominal pain 12/20/2016     GERD (gastroesophageal reflux disease)      Coccidioidomycosis      Vitamin D Deficiency      Osteoarthritis Of The Knee      Chronic Eosinophilic Pneumonia        Current Outpatient Prescriptions   Medication Sig Dispense Refill     aspirin 81 MG EC tablet Take 1 tablet (81 mg total) by mouth daily. 90 tablet 3     cholecalciferol, vitamin D3, 2,000 unit Tab 1 po qd 30 tablet 11     diclofenac (VOLTAREN) 75 MG EC tablet Take 75 mg by  mouth 2 (two) times a day.       docusate sodium (COLACE) 100 MG capsule Take 1 capsule (100 mg total) by mouth daily as needed for constipation. 30 capsule 6     famotidine (PEPCID) 20 MG tablet Take 1 tablet (20 mg total) by mouth 2 (two) times a day. 60 tablet 5     hydroxychloroquine (PLAQUENIL) 200 mg tablet TAKE ONE TABLET BY MOUTH TWICE DAILY 180 tablet 0     polyethylene glycol (GLYCOLAX) 17 gram/dose powder Take 17 g by mouth daily. 255 g 6     omeprazole (PRILOSEC) 20 MG capsule Take 1 capsule (20 mg total) by mouth daily. 30 capsule 3     No current facility-administered medications for this visit.        ROS:   Review of systems negative other than as outlined above    Objective:    /70 (Patient Site: Left Arm, Patient Position: Sitting, Cuff Size: Adult Large)  Pulse 84  Resp 12  Wt 162 lb (73.5 kg)  BMI 25.37 kg/m2  Body mass index is 25.37 kg/(m^2).      General appearance no acute distress    Patient has some medial joint line pain bilaterally no effusion.    No additional exam done            Assessment:  1. Primary osteoarthritis of right knee     2. Gastritis  famotidine (PEPCID) 20 MG tablet   3. Vitamin D deficiency  cholecalciferol, vitamin D3, 2,000 unit Tab   4. Primary osteoarthritis of left knee     5. Inflammatory polyarthropathy of hand       Refill on Pepcid and vitamin D.  Gastritis stable    Patient has osteoarthritis of both knees as well as some inflammatory polyarthropathy issues please see above discussion    Total time the patient was 15 minutes entire time spent in counseling reviewing consultations from orthopedics as well as rheumatology.  He did see Dr. Banks on 2/5/2018    Plan: As outlined above    This transcription uses voice recognition software, which may contain typographical errors.

## 2021-06-18 NOTE — PROGRESS NOTES
"[facility] Daily Progress     Patient Name: Nesha Newton  Date: 6/15/2018  Visit #: 3  PTA visit #:  -  Referral Diagnosis: R RC  Referring provider: Max Mart MD  Visit Diagnosis:     ICD-10-CM    1. Right arm pain M79.601    2. Right arm numbness R20.2    3. Generalized muscle weakness M62.81    4. Poor posture R29.3        Past Medical History:   Diagnosis Date     GERD (gastroesophageal reflux disease)      Vitamin D deficiency          Assessment:   Patient is no longer having pain. He is compliant with his HEP. Patient is full shoulder ROM without pain and has negative neural tension testing in bilateral UE's. Will look into discharge next session if he is still doing well.    HEP/POC compliance is  good .  Patient demonstrates understanding/independence with home program.  Patient is benefitting from skilled physical therapy and is making steady progress toward functional goals.  Patient is appropriate to continue with skilled physical therapy intervention, as indicated by initial plan of care.    Goal Status:  Pt. will demonstrate/verbalize independence in self-management of condition in : 12 weeks  Pt. will be independent with home exercise program in : 6 weeks;Met  Pt. will have improved quality of sleep: with less pain;waking less times/night;in 6 weeks;Met  Pt will: no longer have radicular symptoms past his R elbow within 6 weeks in order to improve funcitonal mobility. (GOAL MET)  Pt will: have similar  strength (within 10 #) in order to return to PLOF within 10 weeks. (GOAL MET)    Plan / Patient Education:     Continue with initial plan of care.  Progress with home program as tolerated.    Exercises:  Exercise #1: scap set  Comment #1: x10 with 5\" holds  Exercise #2: radial nerve tensioners (hold )  Comment #2: x20 on R  Exercise #3: median nerve glides palms together (hold)  Comment #3: x20   Exercise #4: doorway pec stretch  Comment #4: 30\" holds x3  Exercise #5: T and I " "on corner of table (Y increased pain; terminated)  Comment #5: x15 B  Exercise #6: pendulum (HOLD)  Comment #6: x20   Exercise #7: s/l ER with 1#  Comment #7: x15 on R  Exercise #8: shoulder rows and ext w/ L3  Comment #8: until fatigue, 5\" holds    Plan for next session: look into discharge. Review HEP and goals.  Subjective:     Pain Ratin/10    Feeling much better. Isn't having any pain now. No radicular symptoms. Feels his  has come back. Compliant with his HEP.       Objective:     Negative neural tension testing today for all in B UE's    Poor posture    R shoulder AROM:  Flexion- 180 degrees, pain-free  abd- 180, some pain-free  ER- WFL, some pulling into inferior R arm  IR: WFL,  pain-free    R : 86, 87#  L : 87, 94#      Treatment Today     TREATMENT MINUTES COMMENTS   Evaluation     Self-care/ Home management     Manual therapy     Neuromuscular Re-education     Therapeutic Activity     Therapeutic Exercises 23 Pully's 5'. Discussed progress. Objective measures taken. Progressed HEP- see flow sheet for details.   Gait training     Modality__________________                Total 23    Blank areas are intentional and mean the treatment did not include these items.       Wong Caro, PT, DPT  6/15/2018    "

## 2021-06-18 NOTE — PROGRESS NOTES
"[facility] Daily Progress     Patient Name: Nesha Newton  Date: 6/8/2018  Visit #: 2  PTA visit #:  -  Referral Diagnosis: R RC  Referring provider: Max Mart MD  Visit Diagnosis:     ICD-10-CM    1. Right arm pain M79.601    2. Right arm numbness R20.2    3. Generalized muscle weakness M62.81    4. Poor posture R29.3        Past Medical History:   Diagnosis Date     GERD (gastroesophageal reflux disease)      Vitamin D deficiency          Assessment:   Patient is feeling better since beginning therapy and after getting an injection. His neural tension testing is improved today    HEP/POC compliance is  fair .  Patient is benefitting from skilled physical therapy and is making steady progress toward functional goals.  Patient is appropriate to continue with skilled physical therapy intervention, as indicated by initial plan of care.    Goal Status:  Pt. will demonstrate/verbalize independence in self-management of condition in : 12 weeks  Pt. will be independent with home exercise program in : 6 weeks  Pt. will have improved quality of sleep: with less pain;waking less times/night;in 6 weeks  Pt will: no longer have radicular symptoms past his R elbow within 6 weeks in order to improve funcitonal mobility.  Pt will: have symmetrical  strength or greater on the R in order to return to PLOF within 10 weeks.    Plan / Patient Education:     Continue with initial plan of care.  Progress with home program as tolerated.    Exercises:  Exercise #1: scap set  Comment #1: x10 with 5\" holds  Exercise #2: radial nerve tensioners  Comment #2: x20 on R  Exercise #3: median nerve glides palms together  Comment #3: x20   Exercise #4: doorway pec stretch  Comment #4: 30\" holds x3  Exercise #5: T and I on corner of table (Y increased pain; terminated)  Comment #5: x15 B  Exercise #6: pendulum  Comment #6: x20     Plan for next session:pulley's, check nerves and get rid of if able, re-test shoulder, S/L " ERprogress as appropriate, review new exs of HEP, give supine pec stretch and stop door  Subjective:     Pain Ratin-5/10    Had an injection yesterday. Thinks he's getting better. The injection seemed to help- he didn't have pain last night. Doing his HEP 2x/day. No longer getting pain into his arm anymore      Objective:     Mod-max VC needed with HEP today  Negative median and ulnar neural tension testing today    Poor posture    R shoulder AROM:  Flexion- 180 degrees, pain-free  abd- 180, some pain in R shoulder with end-range  ER- T1, pain-free  IR: L1, increase in R shoulder pain      Treatment Today     TREATMENT MINUTES COMMENTS   Evaluation     Self-care/ Home management     Manual therapy 8 In supine: Gr I-II GH distraction, PA's, and inferior glides. Patient did not have any increase in symptoms with MT   Neuromuscular Re-education     Therapeutic Activity     Therapeutic Exercises 15 Pully's 5'. Discussed progress. Objective measures taken. Progressed HEP- see flow sheet for details.   Gait training     Modality__________________                Total 23    Blank areas are intentional and mean the treatment did not include these items.       Wong Caro, PT, DPT  2018

## 2021-06-18 NOTE — PROGRESS NOTES
[facility]   Shoulder Initial Evaluation    Patient Name: Nesha Newton  Date of evaluation: 6/1/2018  Referral Diagnosis: Rotator cuff disorder, right  Referring provider: Max Mart MD  Visit Diagnosis:     ICD-10-CM    1. Right arm pain M79.601    2. Right arm numbness R20.2    3. Generalized muscle weakness M62.81    4. Poor posture R29.3        Assessment:        Nesha Newton is a 57 y.o. male who presents to therapy today with chief complaints of acute right upper extremity pain and numbness. Symptoms began about 1 month ago with sudden onset.  Difficulty with holding/lifting things in his right hand, reaching into cupboard, typing/writing, /hold object, washing/bathing, meal prep due to pain, weakness, and numbness.  Pain symptoms are improving.  Patient demonstrates signs and sx consistent with radiculopathy into R UE, although the exact point of nerve irritation is unclear with testing today. PT POC and goals have been discussed with patient and He  is agreeable to these. Patient appears motivated for physical therapy and is appropriate for skilled therapy services.    The POC is dynamic and will be modified on an ongoing basis.  Prognosis to achieve goals is  good   Pt. is appropriate for skilled PT intervention as outlined in the Plan of Care (POC).  Pt. is a good candidate for skilled PT services to improve pain levels and function.    Goals:  Pt. will demonstrate/verbalize independence in self-management of condition in : 12 weeks  Pt. will be independent with home exercise program in : 6 weeks  Pt. will have improved quality of sleep: with less pain;waking less times/night;in 6 weeks  Pt will: no longer have radicular symptoms past his R elbow within 6 weeks in order to improve funcitonal mobility.  Pt will: have symmetrical  strength or greater on the R in order to return to PLOF within 10 weeks.    Patient's expectations/goals are realistic.    Barriers to Learning  "or Achieving Goals:  No Barriers.       Plan / Patient Instructions:        Plan of Care:   Communication with: Referral Source  Patient Related Instruction: Nature of Condition;Treatment plan and rationale;Self Care instruction;Basis of treatment;Body mechanics;Posture;Precautions;Next steps;Expected outcome  Times per Week: 1  Number of Weeks: 12  Number of Visits: 12  Therapeutic Exercise: ROM;Stretching;Strengthening  Neuromuscular Reeducation: posture;kinesio tape;core  Manual Therapy: myofascial release;joint mobilization;muscle energy;soft tissue mobilization  Equipment: theraband    Exercises:  Exercise #1: scap set  Comment #1: x10 with 5\" holds  Exercise #2: radial nerve tensioners  Comment #2: x20 on R  Exercise #3: median nerve glides palms together  Comment #3: x20   Exercise #4: doorway pec stretch  Comment #4: 30\" holds x3    POC and pathology of condition were reviewed with patient.  Pt. is in agreement with the Plan of Care  A Home Exercise Program (HEP) was initiated today.  Pt. was instructed in exercises by PT and patient was given a handout with detailed instructions.    Plan for next visit: check neck again, check nerves and progress as appropriate, review HEP, give supine pec stretch, T's and I's  .   Subjective:       Social information:   Work Status: unemployed   Hobbies: takes care of father    History of Present Illness:    Nesha is a 57 y.o. male who presents to therapy today with complaints of right arm pain and numbness that stops at his right wrist. Date of onset/duration of symptoms is 1 month ago. Onset was sudden without injury. Symptoms are constant and getting better. He denies history of similar symptoms. He describes their previous level of function as not limited. Denies neck pain    Pain Ratin  Pain rating at best: 4  Pain rating at worst: 8  Pain description: dull, numbness/tingling, weakness    Functional limitations are described as occurring with: holding/lifting " things in his right hand, reaching into cupboard, typing/writing, /hold object, washing/bathing, meal prep      Patient reports benefit from:  anti-inflammatory       Objective:      Note: Items left blank indicates the item was not performed or not indicated at the time of the evaluation.    Patient 14' late to appt    Patient Outcome Measures :    No Data Recorded   Scores range from 0-100%, where a score of 0% represents minimal pain and maximal function. The minimal clincically important difference is a score reduction of 10%.      Shoulder Examination  1. Right arm pain     2. Right arm numbness     3. Generalized muscle weakness     4. Poor posture       Involved side: Right  Posture Observation: poor. Protracted head and shoulers    Cervical AROM: all WFL and pain-free except L SB- then some increase in R posterior shoulder pain    Shoulder/Elbow ROM    Date: 6/1/2018     Shoulder and Elbow ROM ( )   AROM in degrees AROM in degrees AROM in degrees    Right Left (WFL, pain-free) Right Left Right Left   Shoulder Flexion (0-180 ) 180, pain-free        Shoulder Abduction (0-180 ) 155, limited by pain in R shoulder blade        Shoulder Extension (0-60 )         Shoulder ER (0-90 ) WFL, pain-free        Shoulder IR (0-70 )         Shoulder IR/Ext WFL, pain-free        Elbow Flexion (150 ) WFL, increase in biceps pain        Elbow Extension (0 ) WFL, pain-free         PROM in degrees PROM in degrees PROM in degrees    Right Left Right Left Right Left   Shoulder Flexion (0-180 )         Shoulder Abduction (0-180 )         Shoulder Extension (0-60 )         Shoulder ER (0-90 )         Shoulder IR (0-70 )         Elbow Flexion (150 )         Elbow Extension (0 )             Shoulder/Elbow Strength :   Date: 6/1/2018     Shoulder/Elbow Strength (/5)  Manual Muscle Test (MMT) MMT MMT MMT    Right  Left (5/5) Right Left Right Left   Shoulder Flexion         Supraspinatus 4+, pain-free        Shoulder Abduction 4+,  pain-free        Shoulder Extension         Shoulder External Rotation 4+, pain-free        Shoulder Internal Rotation 4+, pain-free        Elbow Flexion 4+, pain-free        Elbow Extension 4+, pain-free        Other:  40, 63, 65#, painful 90, 85, 84#       Other:           Palpation: tender in R UT area    Sensation: not intact R UE only per subjective    +median and radial nerve tension testing on R UE    Treatment Today    TREATMENT MINUTES COMMENTS   Evaluation 15 -shoulder pain   Self-care/ Home management 8 Discussed posture and icing   Manual therapy     Neuromuscular Re-education     Therapeutic Activity     Therapeutic Exercises 8 Discussed PT POC and pathology of condition. Answered patient questions. Began HEP-see flowsheet.    Gait training     Modality__________________                Total 31    Blank areas are intentional and mean the treatment did not include these items.     PT Evaluation Code: (Please list factors)  Patient History/Comorbidities: see PMH  Examination: pain and tingling into R UE  Clinical Presentation: stable  Clinical Decision Making: low    Patient History/  Comorbidities Examination  (body structures and functions, activity limitations, and/or participation restrictions) Clinical Presentation Clinical Decision Making (Complexity)   No documented Comorbidities or personal factors 1-2 Elements Stable and/or uncomplicated Low   1-2 documented comorbidities or personal factor 3 Elements Evolving clinical presentation with changing characteristics Moderate   3-4 documented comorbidities or personal factors 4 or more Unstable and unpredictable High Wong Caro, PT, DPT  6/1/2018  9:40 AM

## 2021-06-18 NOTE — PROGRESS NOTES
Subjective: Patient comes in for follow-up this 57-year-old male had right shoulder pain is on April 30 he had some symptoms of rotator cuff dysfunction with external rotation pain and hyperabduction he was able to fully go through the range of motion though.    I treated him with a Medrol Dosepak and that helped significantly the pain virtually gone for 2-3 weeks now has come back.    He states that he has pain with movements above his head and certain rotational movements.    Denies any new injury.    No weakness or numbness or neck pain    Tobacco status: He  reports that he has never smoked. He has never used smokeless tobacco.    Patient Active Problem List    Diagnosis Date Noted     Primary osteoarthritis of left knee 04/03/2018     Primary osteoarthritis of right knee 04/03/2018     Gastritis 04/03/2018     Adenomatous colon polyp 06/06/2017     Inflammatory polyarthropathy of hand 05/03/2017     Hepatic steatosis 04/24/2017     Polyarthralgia 04/05/2017     Right upper quadrant abdominal pain 12/20/2016     GERD (gastroesophageal reflux disease)      Coccidioidomycosis      Vitamin D Deficiency      Osteoarthritis Of The Knee      Chronic Eosinophilic Pneumonia        Current Outpatient Prescriptions   Medication Sig Dispense Refill     aspirin 81 MG EC tablet Take 1 tablet (81 mg total) by mouth daily. 90 tablet 3     cholecalciferol, vitamin D3, 2,000 unit Tab 1 po qd 30 tablet 11     diclofenac (VOLTAREN) 75 MG EC tablet Take 75 mg by mouth 2 (two) times a day.       docusate sodium (COLACE) 100 MG capsule Take 1 capsule (100 mg total) by mouth daily as needed for constipation. 30 capsule 6     famotidine (PEPCID) 20 MG tablet Take 1 tablet (20 mg total) by mouth 2 (two) times a day. 60 tablet 5     hydroxychloroquine (PLAQUENIL) 200 mg tablet TAKE ONE TABLET BY MOUTH TWICE DAILY 180 tablet 0     methylPREDNISolone (MEDROL DOSEPACK) 4 mg tablet follow package directions 21 tablet 0     omeprazole  (PRILOSEC) 20 MG capsule Take 1 capsule (20 mg total) by mouth daily. 30 capsule 3     polyethylene glycol (GLYCOLAX) 17 gram/dose powder Take 17 g by mouth daily. 255 g 6     No current facility-administered medications for this visit.        ROS: Review of systems negative other than as outlined above    Objective:    BP 96/74 (Patient Site: Left Arm, Patient Position: Sitting, Cuff Size: Adult Large)  Pulse 78  Resp 16  Wt 163 lb (73.9 kg)  SpO2 96%  BMI 25.53 kg/m2  Body mass index is 25.53 kg/(m^2).      General appearance no acute distress    Vital signs are stable    Lungs are clear no rales or rhonchi heart was regular S1-S2    Rate was in the 70s.  O2 sat 96%    Neck without adenopathy    Right shoulder with some anterior glenohumeral joint pain no AC joint pain    He is able to fully abduct but has pain at about 90  abduction and also pain with external rotation, no internal rotation pain    X-ray showed normal glenohumeral surface, some slight irregularity at the AC joint possible arthritis although radiologist did not read that out, the allergist read the x-ray is normal.            Assessment:  1. Rotator cuff disorder, right  XR Shoulder Right 2 or More VWS    Ambulatory referral to PT/OT     Rotator cuff strain.    Go to physical therapy follow-up after if not improved    Plan: As outlined above    This transcription uses voice recognition software, which may contain typographical errors.

## 2021-06-18 NOTE — PROGRESS NOTES
ASSESSMENT AND PLAN:  Nesha Newton 57 y.o. male is a for follow-up of inflammatory arthropathy.  He has osteoarthritis.  He is on hydroxychloroquine.  He reports that this is helping.  He has residual pain in his left shoulder.  When he has features of impingement.  He also gets corticosteroid injections into his knees elsewhere.  We discussed the management principles of osteoarthritis.  He does not want to proceed local injection the left shoulder this time.  I have asked him to take diclofenac major side effects outlined.  He is already on omeprazole to continue this.  Continue hydroxychloroquine reminded of the eye examination.  Return for follow-up here in 3 months.          Diagnoses and all orders for this visit:    Inflammatory polyarthropathy of hand  -     hydroxychloroquine (PLAQUENIL) 200 mg tablet; TAKE ONE TABLET BY MOUTH TWICE DAILY  Dispense: 180 tablet; Refill: 0  -     diclofenac (VOLTAREN) 75 MG EC tablet; Take 1 tablet (75 mg total) by mouth at bedtime.  Dispense: 90 tablet; Refill: 0  -     methylPREDNISolone acetate injection 40 mg (DEPO-MEDROL); Inject 1 mL (40 mg total) into the joint once.    Right shoulder tendinitis  -     methylPREDNISolone acetate injection 40 mg (DEPO-MEDROL); Inject 1 mL (40 mg total) into the joint once.    HISTORY OF PRESENTING ILLNESS:  Nesha Newton, 57 y.o., male is here for follow up of inflammatory polyarthritis.   He has osteoarthritis.  He is noted pain in his right shoulder radiating down the arm going over the past few weeks sometimes wakes him up from sleep.  This is worse with the full range of motion, reaching.  No history of trauma.  He rated this pain as a 5.0/10.  Besides the right shoulder he feels that there has been significant improvement in his upper extremity joint pains.  He had worsening of pain in the knees but he is known to have osteoarthritis.  He went to the orthopedics and had corticosteroid injections with good effect.  He noted  no fever weight loss blurry vision eye redness mouth also nausea cough.  He is taking hydroxychloroquine.  He has had injection into his knees done in December elsewhere.  In the past for inflammatory arthropathy diclofenac did not help.  We discussed the possibility that it might help him with the osteoarthritis symptoms as well as the left shoulder pain.  He would like to try this.  Major side effects including GI renal and heart were discussed.  That has helped him very significantly.  There is no family history of rheumatoid arthritis, lupus.  Other  historical information and ADL limitations as noted in the multidimensional health assessment questionnaire attached in the EMR.    ALLERGIES:Tramadol    PAST MEDICAL/ACTIVE PROBLEMS/MEDICATION/ FAMILY HISTORY/SOCIAL DATA:  The patient has a family history of  Past Medical History:   Diagnosis Date     GERD (gastroesophageal reflux disease)      Vitamin D deficiency      History   Smoking Status     Never Smoker   Smokeless Tobacco     Never Used     Patient Active Problem List   Diagnosis     Coccidioidomycosis     Vitamin D Deficiency     Osteoarthritis Of The Knee     Chronic Eosinophilic Pneumonia     Right upper quadrant abdominal pain     GERD (gastroesophageal reflux disease)     Polyarthralgia     Hepatic steatosis     Inflammatory polyarthropathy of hand     Adenomatous colon polyp     Primary osteoarthritis of left knee     Primary osteoarthritis of right knee     Gastritis     Current Outpatient Prescriptions   Medication Sig Dispense Refill     aspirin 81 MG EC tablet Take 1 tablet (81 mg total) by mouth daily. 90 tablet 3     cholecalciferol, vitamin D3, 2,000 unit Tab 1 po qd 30 tablet 11     diclofenac (VOLTAREN) 75 MG EC tablet Take 75 mg by mouth 2 (two) times a day.       docusate sodium (COLACE) 100 MG capsule Take 1 capsule (100 mg total) by mouth daily as needed for constipation. 30 capsule 6     famotidine (PEPCID) 20 MG tablet Take 1 tablet  (20 mg total) by mouth 2 (two) times a day. 60 tablet 5     hydroxychloroquine (PLAQUENIL) 200 mg tablet TAKE ONE TABLET BY MOUTH TWICE DAILY 180 tablet 0     methylPREDNISolone (MEDROL DOSEPACK) 4 mg tablet follow package directions 21 tablet 0     omeprazole (PRILOSEC) 20 MG capsule Take 1 capsule (20 mg total) by mouth daily. 30 capsule 3     polyethylene glycol (GLYCOLAX) 17 gram/dose powder Take 17 g by mouth daily. 255 g 6     No current facility-administered medications for this visit.      DETAILED EXAMINATION  06/07/18  :  Vitals:    06/07/18 1500   BP: 104/70   Patient Site: Right Arm   Patient Position: Sitting   Cuff Size: Adult Regular   Pulse: 72   Weight: 164 lb (74.4 kg)     Alert oriented. Head including the face is examined for malar rash, heliotropes, scarring, lupus pernio. Eyes examined for redness such as in episcleritis/scleritis, periorbital lesions.   Neck/ Face examined for parotid gland swelling, range of motion of neck.  Left upper and lower and right upper and lower extremities examined for tenderness, swelling, warmth of the appendicular joints, range of motion, edema, rash.  Some of the important findings included: Impingement of the right shoulder.  There is synovitis in any of the palpable upper extremity joints mild JLT of the knees.          LAB / IMAGING DATA:  ALT   Date Value Ref Range Status   04/24/2017 47 (H) 0 - 45 U/L Final   12/20/2016 24 0 - 45 U/L Final   11/09/2016 27 0 - 45 U/L Final     Albumin   Date Value Ref Range Status   04/24/2017 4.0 3.5 - 5.0 g/dL Final   12/20/2016 4.0 3.5 - 5.0 g/dL Final   11/09/2016 4.2 3.5 - 5.0 g/dL Final     Creatinine   Date Value Ref Range Status   04/24/2017 1.04 0.70 - 1.30 mg/dL Final   12/20/2016 1.13 0.70 - 1.30 mg/dL Final   09/28/2015 1.17 0.70 - 1.30 mg/dL Final       WBC   Date Value Ref Range Status   12/14/2017 5.0 4.0 - 11.0 thou/uL Final   01/11/2017 5.6 4.0 - 11.0 thou/uL Final     Hemoglobin   Date Value Ref Range  Status   12/14/2017 15.3 14.0 - 18.0 g/dL Final   01/11/2017 13.8 (L) 14.0 - 18.0 g/dL Final   12/21/2016 12.9 (L) 14.0 - 18.0 g/dL Final     Platelets   Date Value Ref Range Status   12/14/2017 167 140 - 440 thou/uL Final   01/11/2017 125 (L) 140 - 440 thou/uL Final   12/21/2016 152 140 - 440 thou/uL Final       Lab Results   Component Value Date    RF <15.0 02/13/2017    SEDRATE 5 01/11/2017

## 2021-06-19 NOTE — PROGRESS NOTES
"[facility] Daily Progress/Discharge     Patient Name: Nesha Newton  Date: 6/29/2018  Visit #: 4  PTA visit #:  -  Referral Diagnosis: R RC  Referring provider: Max Mart MD  Visit Diagnosis:     ICD-10-CM    1. Right arm pain M79.601    2. Right arm numbness R20.2    3. Generalized muscle weakness M62.81    4. Poor posture R29.3        Past Medical History:   Diagnosis Date     GERD (gastroesophageal reflux disease)      Vitamin D deficiency          Assessment:   Patient feels 80% better since beginning PT and would like to discharge today. He has met his PT goals and is appropriate for discharge. He will need a new order to resume in the future. Patient is agreeable to this plan.    HEP/POC compliance is  good .  Patient demonstrates understanding/independence with home program.  Patient is benefitting from skilled physical therapy and is making steady progress toward functional goals.  Patient is appropriate to continue with skilled physical therapy intervention, as indicated by initial plan of care.    Goal Status:  Pt. will demonstrate/verbalize independence in self-management of condition in : 12 weeks  Pt. will be independent with home exercise program in : 6 weeks;Met  Pt. will have improved quality of sleep: with less pain;waking less times/night;in 6 weeks;Met  Pt will: no longer have radicular symptoms past his R elbow within 6 weeks in order to improve funcitonal mobility. (GOAL MET)  Pt will: have similar  strength (within 10 #) in order to return to PLOF within 10 weeks. (GOAL MET)    Plan / Patient Education:       Exercises:  Exercise #1: scap set  Comment #1: x10 with 5\" holds  Exercise #2: radial nerve tensioners (hold )  Comment #2: x20 on R  Exercise #3: median nerve glides palms together (hold)  Comment #3: x20   Exercise #4: doorway pec stretch  Comment #4: 30\" holds x3  Exercise #5: T and I on corner of table (Y increased pain; terminated)  Comment #5: x15 " "B  Exercise #6: pendulum (HOLD)  Comment #6: x20   Exercise #7: s/l ER with 2#  Comment #7: x15 on R  Exercise #8: shoulder rows w/ L4 and ext w/ L3  Comment #8: until fatigue, 5\" holds    Subjective:     Pain Ratin/10    Feeling 80% better. Sometimes gets posterior R shoulder pain with squeezing his right shoulder back. Feels ready to discharge from PT today. Does not want more exercises. No longer getting radicular symptoms      Objective:   Mod A  Needed with his HEP today  Negative neural tension testing today for all in B UE's    Poor posture still    R shoulder AROM:  Flexion- 180 degrees, pain-free  abd- 180, some pain-free  ER- WFL, pain-free  IR: WFL,  Some pain posterior R shoulder    R : 82, 75, 71#  L : 86, 84, 85#      Treatment Today     TREATMENT MINUTES COMMENTS   Evaluation     Self-care/ Home management     Manual therapy     Neuromuscular Re-education     Therapeutic Activity     Therapeutic Exercises 23 Pully's 5'. Discussed progress. Objective measures taken. Reviewed and progressed HEP- see flow sheet. Discussed plans discussed. ER/IR with L2 performed until fatigue. Body blade used for ER and shoulder flex exs.   Gait training     Modality__________________                Total 23    Blank areas are intentional and mean the treatment did not include these items.       Wong Caro, PT, DPT  2018    "

## 2021-06-20 NOTE — PROGRESS NOTES
Subjective: Patient comes in for evaluation this 57-year-old male has had some discomfort in the epigastric area symptoms been going on for about 3 days.    Sometimes it will go lower he has had normal bowel movements no diarrhea no fever chills.    He has had previous adenomatous colon polyp he had colonoscopy June 2017 which showed polyp otherwise unremarkable.  He is due for recheck and colonoscopy in 2022.    Patient has been on some Pepcid in the past for gastritis.    Omeprazole also in the past but it was not covered.    His symptoms seem to be more epigastric complains of some water brash and some reflux symptoms.  He does not have any back pain.    No fever    Is breathing okay O2 sat look good    He continues on medications for his polyarthralgia including Plaquenil.  He does see Dr. Banks from rheumatology.  Also has diclofenac and needed a refill on that.    We discussed taking with food.    Finally he is due for a flu shot and this was given    We discussed healthcare maintenance issues and he will come in for a physical this winter and get labs    Tobacco status: He  reports that he has never smoked. He has never used smokeless tobacco.    Patient Active Problem List    Diagnosis Date Noted     Right shoulder tendinitis 06/07/2018     Primary osteoarthritis of left knee 04/03/2018     Primary osteoarthritis of right knee 04/03/2018     Gastritis 04/03/2018     Adenomatous colon polyp 06/06/2017     Inflammatory polyarthropathy of hand (H) 05/03/2017     Hepatic steatosis 04/24/2017     Polyarthralgia 04/05/2017     Right upper quadrant abdominal pain 12/20/2016     GERD (gastroesophageal reflux disease)      Coccidioidomycosis      Vitamin D Deficiency      Osteoarthritis Of The Knee      Chronic Eosinophilic Pneumonia        Current Outpatient Prescriptions   Medication Sig Dispense Refill     aspirin 81 MG EC tablet Take 1 tablet (81 mg total) by mouth daily. 90 tablet 3     cholecalciferol, vitamin  D3, 2,000 unit Tab 1 po qd 30 tablet 11     diclofenac (VOLTAREN) 75 MG EC tablet Take 1 tablet (75 mg total) by mouth at bedtime. 90 tablet 0     hydroxychloroquine (PLAQUENIL) 200 mg tablet TAKE ONE TABLET BY MOUTH TWICE DAILY 180 tablet 0     pantoprazole (PROTONIX) 40 MG tablet Take 1 tablet (40 mg total) by mouth daily. 30 tablet 3     VERNON' LIQUI-GELS 100 mg capsule TAKE ONE CAPSULE BY MOUTH ONCE DAILY AS NEEDED FOR  CONSTIPATION 30 capsule 6     polyethylene glycol (GLYCOLAX) 17 gram/dose powder Take 17 g by mouth daily. 255 g 6     ranitidine (ZANTAC) 300 MG tablet Take 1 tablet (300 mg total) by mouth at bedtime. 30 tablet 3     No current facility-administered medications for this visit.        ROS:   10 point review of systems positive as outlined otherwise negative    Objective:    BP 98/62 (Patient Site: Left Arm, Patient Position: Sitting, Cuff Size: Adult Regular)  Pulse 73  Temp 97.5  F (36.4  C) (Oral)   Resp 14  Wt 161 lb (73 kg)  SpO2 95%  BMI 25.22 kg/m2  Body mass index is 25.22 kg/(m^2).      General appearance no acute distress    Neck is supple no adenopathy oropharynx was clear eyes without scleral icterus    Lungs clear no rales or rhonchi, heart was regular S1 and abdomen he complains of symptoms to the epigastric area but was not really tender no guarding or rebound no masses.  Back without CVA pain    No bloating or ascites in the abdomen    Extremities without edema    Skin was normal no rashes no jaundice change.    He complains of some achiness in through his hands especially in someone through the elbows and shoulder.    Labs from 9/6/2018 showed normal liver and creatinine and CBC.    Results for orders placed or performed in visit on 09/06/18   Albumin   Result Value Ref Range    Albumin 4.3 3.5 - 5.0 g/dL   ALT (SGPT)   Result Value Ref Range    ALT 26 0 - 45 U/L   Creatinine   Result Value Ref Range    Creatinine 1.13 0.70 - 1.30 mg/dL    GFR MDRD Af Amer >60 >60  mL/min/1.73m2    GFR MDRD Non Af Amer >60 >60 mL/min/1.73m2   HM2(CBC w/o Differential)   Result Value Ref Range    WBC 5.1 4.0 - 11.0 thou/uL    RBC 5.08 4.40 - 6.20 mill/uL    Hemoglobin 14.8 14.0 - 18.0 g/dL    Hematocrit 43.0 40.0 - 54.0 %    MCV 84 80 - 100 fL    MCH 29.1 27.0 - 34.0 pg    MCHC 34.4 32.0 - 36.0 g/dL    RDW 11.5 11.0 - 14.5 %    Platelets 179 140 - 440 thou/uL    MPV 9.3 7.0 - 10.0 fL       Assessment:  1. Gastritis  pantoprazole (PROTONIX) 40 MG tablet    ranitidine (ZANTAC) 300 MG tablet   2. Polyarthralgia     3. Inflammatory polyarthropathy of hand (H)  diclofenac (VOLTAREN) 75 MG EC tablet   4. Adenomatous colon polyp       Gastritis symptoms we will go on pantoprazole 40 mg a day.  If not covered will take Zantac 300 mg daily.    Continue on the same medicines including his Plaquenil and diclofenac.    History of adenomatous colon polyp due again in 2022.    Call if any symptom changes or lack of improvement with above meds    Flu shot given today as well    Plan: As outlined also will follow-up for physical this winter    This transcription uses voice recognition software, which may contain typographical errors.

## 2021-06-20 NOTE — LETTER
Letter by Max Mart MD at      Author: Max Mart MD Service: -- Author Type: --    Filed:  Encounter Date: 9/11/2020 Status: (Other)         Yajairastephanie Newton  [address] [facility] [address]      September 11, 2020      Dear Sheydionestephanie,    As a valued [facility] patient, your healthcare needs are our priority.  Your health care team has determined that you are due for an virtual appointment regarding your medication follow up.    To help prevent delays in your care, please call the [facility] at [phone].    We look forward to partnering with you to achieve optimal health and wellbeing.    Sincerely,  Your care team at [facility] and [facility]

## 2021-06-20 NOTE — PROGRESS NOTES
ASSESSMENT AND PLAN:  Nesha Newton 57 y.o. male is a for follow-up of inflammatory arthropathy.  He has osteoarthritis, right shoulder tendinitis which responded very nicely to corticosteroid injection.  He noted that the current approach has helped his joints very significantly he is happy.  It is time to check his labs.  He did have his eyes examined recently.  Refills on hydroxychloroquine provided.  Return for follow-up this time in 4 months.            Diagnoses and all orders for this visit:    Polyarthralgia  -     hydroxychloroquine (PLAQUENIL) 200 mg tablet; TAKE ONE TABLET BY MOUTH TWICE DAILY  Dispense: 180 tablet; Refill: 0  -     Albumin  -     ALT (SGPT)  -     Creatinine  -     HM2(CBC w/o Differential)    Inflammatory polyarthropathy of hand (H)  -     hydroxychloroquine (PLAQUENIL) 200 mg tablet; TAKE ONE TABLET BY MOUTH TWICE DAILY  Dispense: 180 tablet; Refill: 0  -     Albumin  -     ALT (SGPT)  -     Creatinine  -     HM2(CBC w/o Differential)      HISTORY OF PRESENTING ILLNESS:  Nesha Newton, 57 y.o., male is here for follow up of inflammatory polyarthritis.   He has osteoarthritis.  He has noted improvement of the right shoulder after the local injection and continues to provide him durable relief.  He noted mild discomfort in his right hand, moderate discomfort in his knees.  He is finding over-the-counter measures helpful.  He is taking hydroxychloroquine that has helped him significantly.  Overall he feels that things have improved significantly over the past few months.  He has noted morning stiffness of no more than 30 minutes.  He is able to do most of his day-to-day activities without any or with some difficulty.  Overall he noted pain level to be 4.0/10..  He had worsening of pain in the knees but he is known to have osteoarthritis.  He went to the orthopedics and had corticosteroid injections with good effect.  He noted no fever weight loss blurry vision eye redness mouth also  nausea cough.  He is taking hydroxychloroquine.  He has had injection into his knees done in December elsewhere.  In the past for inflammatory arthropathy diclofenac did not help.  We discussed the possibility that it might help him with the osteoarthritis symptoms as well as the left shoulder pain.  He would like to try this.  Major side effects including GI renal and heart were discussed.  That has helped him very significantly.  There is no family history of rheumatoid arthritis, lupus.  Other  historical information and ADL limitations as noted in the multidimensional health assessment questionnaire attached in the EMR.    ALLERGIES:Tramadol    PAST MEDICAL/ACTIVE PROBLEMS/MEDICATION/ FAMILY HISTORY/SOCIAL DATA:  The patient has a family history of  Past Medical History:   Diagnosis Date     GERD (gastroesophageal reflux disease)      Vitamin D deficiency      History   Smoking Status     Never Smoker   Smokeless Tobacco     Never Used     Patient Active Problem List   Diagnosis     Coccidioidomycosis     Vitamin D Deficiency     Osteoarthritis Of The Knee     Chronic Eosinophilic Pneumonia     Right upper quadrant abdominal pain     GERD (gastroesophageal reflux disease)     Polyarthralgia     Hepatic steatosis     Inflammatory polyarthropathy of hand (H)     Adenomatous colon polyp     Primary osteoarthritis of left knee     Primary osteoarthritis of right knee     Gastritis     Right shoulder tendinitis     Current Outpatient Prescriptions   Medication Sig Dispense Refill     aspirin 81 MG EC tablet Take 1 tablet (81 mg total) by mouth daily. 90 tablet 3     cholecalciferol, vitamin D3, 2,000 unit Tab 1 po qd 30 tablet 11     diclofenac (VOLTAREN) 75 MG EC tablet Take 1 tablet (75 mg total) by mouth at bedtime. 90 tablet 0     docusate sodium (COLACE) 100 MG capsule Take 1 capsule (100 mg total) by mouth daily as needed for constipation. 30 capsule 6     famotidine (PEPCID) 20 MG tablet Take 1 tablet (20 mg  total) by mouth 2 (two) times a day. 60 tablet 5     hydroxychloroquine (PLAQUENIL) 200 mg tablet TAKE ONE TABLET BY MOUTH TWICE DAILY 180 tablet 0     methylPREDNISolone (MEDROL DOSEPACK) 4 mg tablet follow package directions 21 tablet 0     omeprazole (PRILOSEC) 20 MG capsule Take 1 capsule (20 mg total) by mouth daily. 30 capsule 3     polyethylene glycol (GLYCOLAX) 17 gram/dose powder Take 17 g by mouth daily. 255 g 6     No current facility-administered medications for this visit.      DETAILED EXAMINATION  09/06/18  :  Vitals:    09/06/18 0829   BP: 102/62   Patient Site: Right Arm   Patient Position: Sitting   Cuff Size: Adult Regular   Pulse: 70   Weight: 160 lb (72.6 kg)     Alert oriented. Head including the face is examined for malar rash, heliotropes, scarring, lupus pernio. Eyes examined for redness such as in episcleritis/scleritis, periorbital lesions.   Neck/ Face examined for parotid gland swelling, range of motion of neck.  Left upper and lower and right upper and lower extremities examined for tenderness, swelling, warmth of the appendicular joints, range of motion, edema, rash.  Some of the important findings included: .  There is no synovitis of the upper extremity palpable joints.  He has mild JLT of the knees.  He has full range of motion of the shoulders.  There is no dactylitis of the digits.        LAB / IMAGING DATA:  ALT   Date Value Ref Range Status   04/24/2017 47 (H) 0 - 45 U/L Final   12/20/2016 24 0 - 45 U/L Final   11/09/2016 27 0 - 45 U/L Final     Albumin   Date Value Ref Range Status   04/24/2017 4.0 3.5 - 5.0 g/dL Final   12/20/2016 4.0 3.5 - 5.0 g/dL Final   11/09/2016 4.2 3.5 - 5.0 g/dL Final     Creatinine   Date Value Ref Range Status   04/24/2017 1.04 0.70 - 1.30 mg/dL Final   12/20/2016 1.13 0.70 - 1.30 mg/dL Final   09/28/2015 1.17 0.70 - 1.30 mg/dL Final       WBC   Date Value Ref Range Status   12/14/2017 5.0 4.0 - 11.0 thou/uL Final   01/11/2017 5.6 4.0 - 11.0 thou/uL  Final     Hemoglobin   Date Value Ref Range Status   12/14/2017 15.3 14.0 - 18.0 g/dL Final   01/11/2017 13.8 (L) 14.0 - 18.0 g/dL Final   12/21/2016 12.9 (L) 14.0 - 18.0 g/dL Final     Platelets   Date Value Ref Range Status   12/14/2017 167 140 - 440 thou/uL Final   01/11/2017 125 (L) 140 - 440 thou/uL Final   12/21/2016 152 140 - 440 thou/uL Final       Lab Results   Component Value Date    RF <15.0 02/13/2017    SEDRATE 5 01/11/2017

## 2021-06-21 NOTE — PROGRESS NOTES
Subjective: Patient comes in for physical    Patient is up-to-date on colonoscopy    Continues Protonix Zantac docusate and polyethylene glycol for his constipation and gastritis.    He sees Dr. Banks from rheumatology for his inflammatory polyarthropathy.  Mainly involving the hands.    Patient's had previous colon polyp I reviewed the recommendations with him in follow-up.    Patient's had previous hepatic steatosis labs were drawn today liver tests look good    His triglycerides were still high at 274 LDL was okay    PSA level looked good as well please see below 1.5    No additional concerns or issues no new findings and family history denies any bowel problems or bladder problems no chest pain or shortness of breath    Tobacco status: He  reports that  has never smoked. he has never used smokeless tobacco.    Patient Active Problem List    Diagnosis Date Noted     Right shoulder tendinitis 06/07/2018     Primary osteoarthritis of left knee 04/03/2018     Primary osteoarthritis of right knee 04/03/2018     Chronic gastritis without bleeding, unspecified gastritis type 04/03/2018     Adenomatous polyp of colon, unspecified part of colon 06/06/2017     Inflammatory polyarthropathy of hand (H) 05/03/2017     Hepatic steatosis 04/24/2017     Polyarthralgia 04/05/2017     Right upper quadrant abdominal pain 12/20/2016     GERD (gastroesophageal reflux disease)      Coccidioidomycosis      Vitamin D Deficiency      Osteoarthritis Of The Knee      Chronic Eosinophilic Pneumonia        Current Outpatient Medications   Medication Sig Dispense Refill     diclofenac (VOLTAREN) 75 MG EC tablet Take 1 tablet (75 mg total) by mouth at bedtime. 90 tablet 0     pantoprazole (PROTONIX) 40 MG tablet Take 1 tablet (40 mg total) by mouth daily. 30 tablet 3     polyethylene glycol (GLYCOLAX) 17 gram/dose powder Take 17 g by mouth daily. 510 g 11     aspirin 81 MG EC tablet Take 1 tablet (81 mg total) by mouth daily. 150 tablet 2  "    cholecalciferol, vitamin D3, 2,000 unit Tab 1 po qd 30 tablet 11     docusate sodium (COLACE) 100 MG capsule Take 1 capsule (100 mg total) by mouth daily as needed for constipation. 30 capsule 2     hydroxychloroquine (PLAQUENIL) 200 mg tablet TAKE ONE TABLET BY MOUTH TWICE DAILY 180 tablet 0     ranitidine (ZANTAC) 300 MG tablet Take 1 tablet (300 mg total) by mouth at bedtime. 30 tablet 3     No current facility-administered medications for this visit.        ROS:   10 point review of systems negative other than as outlined above    Objective:    /68 (Patient Site: Right Arm, Patient Position: Sitting, Cuff Size: Adult Regular)   Pulse 74   Temp 97.1  F (36.2  C)   Resp 12   Ht 5' 7\" (1.702 m)   Wt 161 lb (73 kg)   BMI 25.22 kg/m    Body mass index is 25.22 kg/m .    General appearance no acute distress    HEENT neck is supple oropharynx is clear pupils react normally    Lungs are clear no rales or rhonchi heart regular S1-S2    No axillary or inguinal adenopathy    Neck without bruit or thyroid fullness    Abdomen soft nontender no guarding or rebound    Genitalia normal descended testes no evidence of hernia rectal was done prostate smooth and small.    Extremities without edema.    No synovitis, no joint redness warmth or swelling    Lab work is outlined  Results for orders placed or performed in visit on 11/12/18   Comprehensive Metabolic Panel   Result Value Ref Range    Sodium 140 136 - 145 mmol/L    Potassium 4.3 3.5 - 5.0 mmol/L    Chloride 106 98 - 107 mmol/L    CO2 24 22 - 31 mmol/L    Anion Gap, Calculation 10 5 - 18 mmol/L    Glucose 95 70 - 125 mg/dL    BUN 15 8 - 22 mg/dL    Creatinine 1.07 0.70 - 1.30 mg/dL    GFR MDRD Af Amer >60 >60 mL/min/1.73m2    GFR MDRD Non Af Amer >60 >60 mL/min/1.73m2    Bilirubin, Total 0.5 0.0 - 1.0 mg/dL    Calcium 9.4 8.5 - 10.5 mg/dL    Protein, Total 7.1 6.0 - 8.0 g/dL    Albumin 4.1 3.5 - 5.0 g/dL    Alkaline Phosphatase 61 45 - 120 U/L    AST 23 0 - " 40 U/L    ALT 31 0 - 45 U/L   Lipid Cascade FASTING   Result Value Ref Range    Cholesterol 167 <=199 mg/dL    Triglycerides 274 (H) <=149 mg/dL    HDL Cholesterol 31 (L) >=40 mg/dL    LDL Calculated 81 <=129 mg/dL    Patient Fasting > 8hrs? Yes    HM2(CBC w/o Differential)   Result Value Ref Range    WBC 6.0 4.0 - 11.0 thou/uL    RBC 5.10 4.40 - 6.20 mill/uL    Hemoglobin 14.2 14.0 - 18.0 g/dL    Hematocrit 43.5 40.0 - 54.0 %    MCV 85 80 - 100 fL    MCH 27.9 27.0 - 34.0 pg    MCHC 32.8 32.0 - 36.0 g/dL    RDW 12.3 11.0 - 14.5 %    Platelets 169 140 - 440 thou/uL    MPV 10.4 (H) 7.0 - 10.0 fL   PSA, Annual Screen (Prostatic-Specific Antigen)   Result Value Ref Range    PSA 1.5 0.0 - 3.5 ng/mL       Assessment:  1. Routine general medical examination at a health care facility  Comprehensive Metabolic Panel    Lipid Cascade FASTING    HM2(CBC w/o Differential)    PSA, Annual Screen (Prostatic-Specific Antigen)   2. Chronic gastritis without bleeding, unspecified gastritis type  HM2(CBC w/o Differential)   3. Adenomatous polyp of colon, unspecified part of colon     4. Inflammatory polyarthropathy of hand (H)  Comprehensive Metabolic Panel    aspirin 81 MG EC tablet   5. Hepatic steatosis     6. Vitamin D deficiency  Vitamin D, Total (25-Hydroxy)   7. Constipation, unspecified constipation type  docusate sodium (COLACE) 100 MG capsule    polyethylene glycol (GLYCOLAX) 17 gram/dose powder     Stable physical    Gastritis controlled    Continue on same meds for joints    Hepatic steatosis liver tests normal but high triglycerides    Vitamin D deficiency level pending.,  I believe he is on 2000 units a day    Plan: As outlined above up-to-date on his colonoscopy    No change in meds.  Work on diet.  Recheck 6 months check triglycerides    This transcription uses voice recognition software, which may contain typographical errors.

## 2021-06-22 ENCOUNTER — RECORDS - HEALTHEAST (OUTPATIENT)
Dept: ADMINISTRATIVE | Facility: OTHER | Age: 61
End: 2021-06-22

## 2021-06-23 NOTE — PROGRESS NOTES
ASSESSMENT AND PLAN:  Nesha Newton 58 y.o. male is a for follow-up of inflammatory arthropathy.  He has osteoarthritis, right shoulder tendinitis.  He has features consistent with carpal tunnel syndrome on the right side.  Hydroxychloroquine is helping.  Continue.  I did of eye examination.  He has flareup of pain in his right shoulder.  He would like to go for repeat injection, 40 mg of methylprednisolone injected after pros and cons are outlined.  He describes a right upper extremity numbness and tingling nocturnally, with features consistent with carpal tunnel syndrome, would like to go ahead with an injection instead of surgery, 20 mg of methylprednisolone under ultrasound guidance injected into the right carpal tunnel after pros and cons were discussed.  Return for follow-up in 3 months.    Diagnoses and all orders for this visit:    Inflammatory polyarthropathy of hand (H)  -     hydroxychloroquine (PLAQUENIL) 200 mg tablet  Dispense: 180 tablet; Refill: 0    Right shoulder tendinitis  -     methylPREDNISolone acetate injection 40 mg (DEPO-MEDROL)    Right carpal tunnel syndrome  -     methylPREDNISolone acetate injection 20 mg (DEPO-MEDROL)    Polyarthralgia  -     hydroxychloroquine (PLAQUENIL) 200 mg tablet  Dispense: 180 tablet; Refill: 0      HISTORY OF PRESENTING ILLNESS:  Nesha Newton, 58 y.o., male is here for follow up of inflammatory polyarthritis for which she is on hydroxychloroquine.  He has done so much better with that.  He reports pain in his right shoulder and right upper extremity.  He is woken up from sleep when the arm gets numb and tingly.  He feels at this predominantly in his right hand.  This can happen if he holds onto something for a length of time in front of him.  He has not noted swelling.  There is no history of trauma.  He has in the past had excellent response to corticosteroid injection in her for this was done several months ago.  Left side does not trouble him.  Apart  from the left upper extremity symptoms he has felt, that things have improved significantly over the past few months.  He has noted morning stiffness of no more than 30 minutes.  He is able to do most of his day-to-day activities without any or with some difficulty.  Overall he noted pain level to be 4.0/10..  He had worsening of pain in the knees but he is known to have osteoarthritis.  He went to the orthopedics and had corticosteroid injections with good effect.  He noted no fever weight loss blurry vision eye redness mouth also nausea cough.  He is taking hydroxychloroquine.  He has had injection into his knees done in December elsewhere.  In the past for inflammatory arthropathy diclofenac did not help.  We discussed the possibility that it might help him with the osteoarthritis symptoms as well as the left shoulder pain.  He would like to try this.  Major side effects including GI renal and heart were discussed.  That has helped him very significantly.  There is no family history of rheumatoid arthritis, lupus.  Other  historical information and ADL limitations as noted in the multidimensional health assessment questionnaire attached in the EMR.    ALLERGIES:Tramadol    PAST MEDICAL/ACTIVE PROBLEMS/MEDICATION/ FAMILY HISTORY/SOCIAL DATA:  The patient has a family history of  Past Medical History:   Diagnosis Date     GERD (gastroesophageal reflux disease)      Vitamin D deficiency      Social History     Tobacco Use   Smoking Status Never Smoker   Smokeless Tobacco Never Used     Patient Active Problem List   Diagnosis     Coccidioidomycosis     Vitamin D Deficiency     Osteoarthritis Of The Knee     Chronic Eosinophilic Pneumonia     Right upper quadrant abdominal pain     GERD (gastroesophageal reflux disease)     Polyarthralgia     Hepatic steatosis     Inflammatory polyarthropathy of hand (H)     Adenomatous polyp of colon, unspecified part of colon     Primary osteoarthritis of left knee     Primary  osteoarthritis of right knee     Chronic gastritis without bleeding, unspecified gastritis type     Right shoulder tendinitis     Current Outpatient Medications   Medication Sig Dispense Refill     aspirin 81 MG EC tablet Take 1 tablet (81 mg total) by mouth daily. 150 tablet 2     cholecalciferol, vitamin D3, 2,000 unit Tab 1 po qd 30 tablet 11     diclofenac (VOLTAREN) 75 MG EC tablet Take 1 tablet (75 mg total) by mouth at bedtime. 90 tablet 0     docusate sodium (COLACE) 100 MG capsule Take 1 capsule (100 mg total) by mouth daily as needed for constipation. 30 capsule 2     hydroxychloroquine (PLAQUENIL) 200 mg tablet TAKE ONE TABLET BY MOUTH TWICE DAILY 180 tablet 0     pantoprazole (PROTONIX) 40 MG tablet Take 1 tablet (40 mg total) by mouth daily. 30 tablet 3     polyethylene glycol (GLYCOLAX) 17 gram/dose powder Take 17 g by mouth daily. 510 g 11     ranitidine (ZANTAC) 300 MG tablet Take 1 tablet (300 mg total) by mouth at bedtime. 30 tablet 3     No current facility-administered medications for this visit.      DETAILED EXAMINATION  01/10/19  :  Vitals:    01/10/19 0759   BP: 110/70   Patient Site: Right Arm   Patient Position: Sitting   Cuff Size: Adult Regular   Pulse: 80     Alert oriented. Head including the face is examined for malar rash, heliotropes, scarring, lupus pernio. Eyes examined for redness such as in episcleritis/scleritis, periorbital lesions.   Neck/ Face examined for parotid gland swelling, range of motion of neck.  Left upper and lower and right upper and lower extremities examined for tenderness, swelling, warmth of the appendicular joints, range of motion, edema, rash.  Some of the important findings included: .  There is no synovitis of the upper extremity palpable joints.  He has mild JLT of the knees.  Internal rotation at the right shoulder reproduces the shoulder pain.  He is at the right carpal area.              LAB / IMAGING DATA:  ALT   Date Value Ref Range Status    11/12/2018 31 0 - 45 U/L Final   09/06/2018 26 0 - 45 U/L Final   04/24/2017 47 (H) 0 - 45 U/L Final     Albumin   Date Value Ref Range Status   11/12/2018 4.1 3.5 - 5.0 g/dL Final   09/06/2018 4.3 3.5 - 5.0 g/dL Final   04/24/2017 4.0 3.5 - 5.0 g/dL Final     Creatinine   Date Value Ref Range Status   11/12/2018 1.07 0.70 - 1.30 mg/dL Final   09/06/2018 1.13 0.70 - 1.30 mg/dL Final   04/24/2017 1.04 0.70 - 1.30 mg/dL Final       WBC   Date Value Ref Range Status   11/12/2018 6.0 4.0 - 11.0 thou/uL Final   09/06/2018 5.1 4.0 - 11.0 thou/uL Final     Hemoglobin   Date Value Ref Range Status   11/12/2018 14.2 14.0 - 18.0 g/dL Final   09/06/2018 14.8 14.0 - 18.0 g/dL Final   12/14/2017 15.3 14.0 - 18.0 g/dL Final     Platelets   Date Value Ref Range Status   11/12/2018 169 140 - 440 thou/uL Final   09/06/2018 179 140 - 440 thou/uL Final   12/14/2017 167 140 - 440 thou/uL Final       Lab Results   Component Value Date    RF <15.0 02/13/2017    SEDRATE 5 01/11/2017

## 2021-06-24 NOTE — TELEPHONE ENCOUNTER
Refill Approved    Rx renewed per Medication Renewal Policy. Medication was last renewed on 11.12.18.    Trista Ramirez, [facility] Triage/Med Refill 2/26/2019     Requested Prescriptions   Pending Prescriptions Disp Refills     pantoprazole (PROTONIX) 40 MG tablet [Pharmacy Med Name: PANTOPRAZOLE SOD 40MG TAB] 30 tablet 3     Sig: TAKE 1 TABLET BY MOUTH ONCE DAILY    GI Medications Refill Protocol Passed - 2/25/2019 10:09 AM       Passed - PCP or prescribing provider visit in last 12 or next 3 months.    Last office visit with prescriber/PCP: 10/9/2018 Max Mart MD OR same dept: 10/9/2018 Max Mart MD OR same specialty: 10/9/2018 Max Mart MD  Last physical: 11/12/2018 Last MTM visit: Visit date not found   Next visit within 3 mo: Visit date not found  Next physical within 3 mo: Visit date not found  Prescriber OR PCP: Max Mart MD  Last diagnosis associated with med order: 1. Gastritis  - pantoprazole (PROTONIX) 40 MG tablet [Pharmacy Med Name: PANTOPRAZOLE SOD 40MG TAB]; TAKE 1 TABLET BY MOUTH ONCE DAILY  Dispense: 30 tablet; Refill: 3    If protocol passes may refill for 12 months if within 3 months of last provider visit (or a total of 15 months).

## 2021-06-27 ENCOUNTER — HEALTH MAINTENANCE LETTER (OUTPATIENT)
Age: 61
End: 2021-06-27

## 2021-07-06 ENCOUNTER — HOSPITAL ENCOUNTER (EMERGENCY)
Dept: EMERGENCY MEDICINE | Facility: CLINIC | Age: 61
Discharge: HOME | End: 2021-07-06
Payer: COMMERCIAL

## 2021-07-06 DIAGNOSIS — R10.32 LEFT LOWER QUADRANT ABDOMINAL PAIN: ICD-10-CM

## 2021-07-06 LAB
ALBUMIN SERPL-MCNC: 3.9 G/DL (ref 3.5–5)
ALBUMIN UR-MCNC: NEGATIVE G/DL
ALP SERPL-CCNC: 62 U/L (ref 45–120)
ALT SERPL W P-5'-P-CCNC: 25 U/L (ref 0–45)
ANION GAP SERPL CALCULATED.3IONS-SCNC: 8 MMOL/L (ref 5–18)
APPEARANCE UR: CLEAR
AST SERPL W P-5'-P-CCNC: 21 U/L (ref 0–40)
BASOPHILS # BLD AUTO: 0 THOU/UL (ref 0–0.2)
BASOPHILS NFR BLD AUTO: 1 % (ref 0–2)
BILIRUB SERPL-MCNC: 0.5 MG/DL (ref 0–1)
BILIRUB UR QL STRIP: NEGATIVE
BUN SERPL-MCNC: 7 MG/DL (ref 8–22)
C REACTIVE PROTEIN LHE: 4.7 MG/DL (ref 0–0.8)
CALCIUM SERPL-MCNC: 8.8 MG/DL (ref 8.5–10.5)
CHLORIDE BLD-SCNC: 107 MMOL/L (ref 98–107)
CO2 SERPL-SCNC: 22 MMOL/L (ref 22–31)
COLOR UR AUTO: COLORLESS
CREAT SERPL-MCNC: 1.06 MG/DL (ref 0.7–1.3)
EOSINOPHIL # BLD AUTO: 0.1 THOU/UL (ref 0–0.4)
EOSINOPHIL NFR BLD AUTO: 2 % (ref 0–6)
ERYTHROCYTE [DISTWIDTH] IN BLOOD BY AUTOMATED COUNT: 14.1 % (ref 11–14.5)
GFR SERPL CREATININE-BSD FRML MDRD: >60 ML/MIN/1.73M2
GLUCOSE BLD-MCNC: 115 MG/DL (ref 70–125)
GLUCOSE UR STRIP-MCNC: NEGATIVE MG/DL
HCT VFR BLD AUTO: 36.4 % (ref 40–54)
HGB BLD-MCNC: 12 G/DL (ref 14–18)
HGB UR QL STRIP: NEGATIVE
IMM GRANULOCYTES # BLD: 0 THOU/UL
IMM GRANULOCYTES NFR BLD: 0 %
KETONES UR STRIP-MCNC: NEGATIVE MG/DL
LEUKOCYTE ESTERASE UR QL STRIP: NEGATIVE
LIPASE SERPL-CCNC: 52 U/L (ref 0–52)
LYMPHOCYTES # BLD AUTO: 0.7 THOU/UL (ref 0.8–4.4)
LYMPHOCYTES NFR BLD AUTO: 17 % (ref 20–40)
MCH RBC QN AUTO: 28.6 PG (ref 27–34)
MCHC RBC AUTO-ENTMCNC: 33 G/DL (ref 32–36)
MCV RBC AUTO: 87 FL (ref 80–100)
MONOCYTES # BLD AUTO: 0.6 THOU/UL (ref 0–0.9)
MONOCYTES NFR BLD AUTO: 14 % (ref 2–10)
NEUTROPHILS # BLD AUTO: 2.8 THOU/UL (ref 2–7.7)
NEUTROPHILS NFR BLD AUTO: 66 % (ref 50–70)
NITRATE UR QL: NEGATIVE
PH UR STRIP: 5.5 [PH] (ref 5–8)
PLATELET # BLD AUTO: 110 THOU/UL (ref 140–440)
PMV BLD AUTO: 14.2 FL (ref 8.5–12.5)
POTASSIUM BLD-SCNC: 4.2 MMOL/L (ref 3.5–5)
PROT SERPL-MCNC: 7 G/DL (ref 6–8)
RBC # BLD AUTO: 4.2 MILL/UL (ref 4.4–6.2)
SODIUM SERPL-SCNC: 137 MMOL/L (ref 136–145)
SP GR UR STRIP: 1 (ref 1–1.03)
UROBILINOGEN UR STRIP-ACNC: NORMAL
WBC: 4.2 THOU/UL (ref 4–11)

## 2021-07-06 ASSESSMENT — MIFFLIN-ST. JEOR: SCORE: 1429.54

## 2021-07-06 NOTE — ED PROVIDER NOTES
ED Provider Notes by Meseret Esquivel PA-C at 7/6/2021  9:22 AM     Author: Meseret Esquivel PA-C Service: -- Author Type: Physician Assistant    Filed: 7/6/2021 11:08 AM Date of Service: 7/6/2021  9:22 AM Status: Signed    : Meseret Esquivel PA-C (Physician Assistant)       EMERGENCY DEPARTMENT ENCOUNTER      NAME: Nesha Newton  AGE: 60 y.o. male  YOB: 1960  MRN: [id number]  EVALUATION DATE & TIME: 7/6/2021  8:57 AM    PCP: Max Mart MD    ED PROVIDER: Meseret Esquivel PA-C      Chief Complaint   Patient presents with   ? Abdominal Pain         FINAL IMPRESSION:  1. Left lower quadrant abdominal pain          MEDICAL DECISION MAKING:    Pertinent Labs & Imaging studies reviewed. (See chart for details)  60 y.o. male with a pertinent history of gastritis, GERD, and hepatic steatosis presents to the Emergency Department for evaluation of LLQ abdominal pain onset early this morning. Reports chronic abdominal pain for the last 1.5 years. Is doing a bowel prep currently and is scheduled for colonoscopy with [facility] in two days. Last solid food intake was yesterday and he is not supposed to have any solid foods until colonoscopy. Denies any fevers, chills, vomiting, bloody or black stools or urinary symptoms. He has been having loose stools secondary to the bowel prep. Reports pain is similar to previous.    Vitals reviewed and unremarkable. On exam, abdomen is soft with mild LLQ tenderness. No distension, rebound or guarding. No back or CVA tenderness. Differential diagnosis includes but not limited to mesenteric ischemia, colitis, diverticulitis, appendicitis, pyelonephritis, ureterolithiasis, bowel obstruction, IBS, intestinal colic/gas pains.     UA without evidence of infection. No leukocytosis. Mild elevation of CRP at 4.7. Hgb 12.0 (14.3 3 months ago). He denies any black or bloody stools. Renal function WNL. No electrolyte derangements. Lipase and LFTs WNL. He has had several  abdominal CT scans for his chronic abdominal pain, last being one month ago which was unremarkable with no nephrolithiasis, diverticulosis or any other findings. Also reviewed CT scans from 03/21, 08/20, 12/19, 07/19 all unremarkable. On repeat evaluation he reports feeling significantly better at 2 mg of morphine and IVF. Abdominal plain films with no evidence of bowel obstruction or free intraperitoneal air. No radiopaque kidney stone. I suspect he is having some cramping from the bowel prep. On repeat exam he has no tenderness. No rebound or guarding. He feels comfortable discharging home. We discussed return precautions and he has follow up with [facility] in two days. Discharged home in stable condition.     0 minutes of critical care time     ED COURSE  9:15 AM I met with the patient, obtained history, performed an initial exam, and discussed options and plan for diagnostics and treatment here in the ED.  9:59 I rechecked and updated the patient. Patient is feeling significantly better.  10:41 AM I discussed the plan for discharge with the patient, and patient is agreeable. We discussed supportive cares at home and reasons for return to the ER including new or worsening symptoms - all questions and concerns addressed. Patient to be discharged by RN.     At the conclusion of the encounter I discussed the results of all of the tests and the disposition. The questions were answered. The patient and family acknowledged understanding and were agreeable with the care plan.    PPE: Provider wore gloves, N95 mask, eye protection, surgical cap, and paper mask.      MEDICATIONS GIVEN IN THE EMERGENCY:  Medications   sodium chloride 0.9% 1,000 mL (0 mL Intravenous Stopped 7/6/21 1020)   morphine injection 2 mg (2 mg Intravenous Given 7/6/21 0921)       NEW PRESCRIPTIONS STARTED AT TODAY'S ER VISIT  Current Discharge Medication List      CONTINUE these medications which have NOT CHANGED    Details   acetaminophen (TYLENOL) 500  MG tablet Take 2 tablets (1,000 mg total) by mouth every 6 (six) hours as needed for pain.  Qty: 100 tablet, Refills: 1    Associated Diagnoses: Lower abdominal pain      aspirin 81 MG EC tablet Take 1 tablet (81 mg total) by mouth daily.  Qty: 90 tablet, Refills: 1    Associated Diagnoses: Inflammatory polyarthropathy of hand (H)      cholecalciferol, vitamin D3, (VITAMIN D3) 50 mcg (2,000 unit) capsule Take 1 capsule (2,000 Units total) by mouth daily.  Qty: 30 capsule, Refills: 5    Associated Diagnoses: Vitamin D deficiency      docusate sodium (COLACE) 100 MG capsule 1 po 2x per day as needed for constipation  Qty: 60 capsule, Refills: 3    Associated Diagnoses: Slow transit constipation      famotidine (PEPCID) 40 MG tablet Take 1 tablet (40 mg total) by mouth every evening.  Qty: 30 tablet, Refills: 5    Associated Diagnoses: Chronic gastritis without bleeding, unspecified gastritis type      gabapentin (NEURONTIN) 100 MG capsule Take 100 mg by mouth 3 (three) times a day.  Qty: 90 capsule, Refills: 2    Associated Diagnoses: Foraminal stenosis of lumbar region      hydrocortisone-pramoxine (ANALPRAM-HC) 2.5-1 % rectal cream Insert into the rectum 3 (three) times a day. Apply  Externally bid as directed  Qty: 30 g, Refills: 1    Associated Diagnoses: Thrombosed external hemorrhoids      hydrOXYchloroQUINE (PLAQUENIL) 200 mg tablet Take 1 tablet (200 mg total) by mouth daily.  Qty: 90 tablet, Refills: 1    Associated Diagnoses: Inflammatory polyarthropathy of hand (H); Polyarthralgia      ibuprofen (ADVIL,MOTRIN) 600 MG tablet TAKE 1 TABLET BY MOUTH THREE TIMES DAILY AS NEEDED FOR PAIN  Qty: 60 tablet, Refills: 1    Associated Diagnoses: Polyarthralgia      lidocaine (XYLOCAINE) 5 % ointment Apply two times a day prn  Qty: 35.44 g, Refills: 1    Associated Diagnoses: Lumbar facet arthropathy      pantoprazole (PROTONIX) 40 MG tablet Take 1 tablet (40 mg total) by mouth daily.  Qty: 30 tablet, Refills: 5     Associated Diagnoses: Gastroesophageal reflux disease, unspecified whether esophagitis present      polyethylene glycol (GLYCOLAX) 17 gram/dose powder Take 17 g by mouth 2 (two) times a day.  Qty: 765 g, Refills: 5    Associated Diagnoses: Slow transit constipation      propranoloL (INDERAL LA) 60 mg 24 hr capsule Take 1 capsule (60 mg total) by mouth daily.  Qty: 30 capsule, Refills: 5    Associated Diagnoses: Intention tremor      sucralfate (CARAFATE) 1 gram tablet 1 po four times a day before meals and at bedtime as needed for reflux  Qty: 120 tablet, Refills: 3    Associated Diagnoses: Gastroesophageal reflux disease, unspecified whether esophagitis present                =================================================================    HPI    Patient information was obtained from: Patient's daughter and patient    Patient is accompanied by daughter.     Use of Interpretor: Patient's daughter per patient's request.        Nesha Newton is a 60 y.o. male with a pertinent history of gastritis, GERD, and hepatic steatosis who presents to this ED via walk-in for evaluation of abdominal pain.     The patient's daughter states that the patient has had 1.5 years of chronic abdominal pain and is currently doing a bowel preparation with Miralax for a colonoscopy that is scheduled in two days. His last solid food was eaten yesterday and he is instructed no solids from today until the colonoscopy. She explains that this morning at 0600 he had onset of the same LLQ abdominal pain but with increased severity radiating to the mid-lower abdomen. He has been having loose stools which she attributes to the bowel prep. The patient denies melena, hematochezia, nausea, vomiting, fever, urinary symptoms, and any other symptoms or complaints at this time.     REVIEW OF SYSTEMS   Review of Systems   Constitutional: Negative for chills and fever.   Gastrointestinal: Positive for abdominal pain (LLQ) and diarrhea. Negative for  blood in stool, nausea and vomiting.   Genitourinary: Negative for difficulty urinating, dysuria, frequency and hematuria.   All other systems reviewed and are negative.    PAST MEDICAL HISTORY:  Past Medical History:   Diagnosis Date   ? Chronic gastritis without bleeding, unspecified gastritis type 4/3/2018   ? Coccidioidomycosis     Created by Conversion    ? GERD (gastroesophageal reflux disease)    ? Hepatic steatosis 4/24/2017    HCV neg,    ? Osteoarthritis Of The Knee     Created by Conversion  Replacement Utility updated for latest IMO load   ? Polyarthralgia 4/5/2017   ? Vitamin D deficiency        PAST SURGICAL HISTORY:  No past surgical history on file.        CURRENT MEDICATIONS:    No current facility-administered medications on file prior to encounter.      Current Outpatient Medications on File Prior to Encounter   Medication Sig   ? acetaminophen (TYLENOL) 500 MG tablet Take 2 tablets (1,000 mg total) by mouth every 6 (six) hours as needed for pain.   ? aspirin 81 MG EC tablet Take 1 tablet (81 mg total) by mouth daily.   ? cholecalciferol, vitamin D3, (VITAMIN D3) 50 mcg (2,000 unit) capsule Take 1 capsule (2,000 Units total) by mouth daily.   ? docusate sodium (COLACE) 100 MG capsule 1 po 2x per day as needed for constipation   ? famotidine (PEPCID) 40 MG tablet Take 1 tablet (40 mg total) by mouth every evening.   ? gabapentin (NEURONTIN) 100 MG capsule Take 100 mg by mouth 3 (three) times a day.   ? hydrocortisone-pramoxine (ANALPRAM-HC) 2.5-1 % rectal cream Insert into the rectum 3 (three) times a day. Apply  Externally bid as directed   ? hydrOXYchloroQUINE (PLAQUENIL) 200 mg tablet Take 1 tablet (200 mg total) by mouth daily.   ? ibuprofen (ADVIL,MOTRIN) 600 MG tablet TAKE 1 TABLET BY MOUTH THREE TIMES DAILY AS NEEDED FOR PAIN   ? lidocaine (XYLOCAINE) 5 % ointment Apply two times a day prn   ? pantoprazole (PROTONIX) 40 MG tablet Take 1 tablet (40 mg total) by mouth daily.   ? polyethylene  glycol (GLYCOLAX) 17 gram/dose powder Take 17 g by mouth 2 (two) times a day.   ? propranoloL (INDERAL LA) 60 mg 24 hr capsule Take 1 capsule (60 mg total) by mouth daily.   ? sucralfate (CARAFATE) 1 gram tablet 1 po four times a day before meals and at bedtime as needed for reflux       ALLERGIES:  Allergies   Allergen Reactions   ? Tramadol Itching     Pt states itchy all over   ? Primidone Other (See Comments)     Dizziness and vomitting       FAMILY HISTORY:  Family History   Problem Relation Age of Onset   ? Diabetes Mother        SOCIAL HISTORY:   Social History     Socioeconomic History   ? Marital status:      Spouse name: Not on file   ? Number of children: Not on file   ? Years of education: Not on file   ? Highest education level: Not on file   Occupational History   ? Not on file   Social Needs   ? Financial resource strain: Not on file   ? Food insecurity     Worry: Not on file     Inability: Not on file   ? Transportation needs     Medical: Not on file     Non-medical: Not on file   Tobacco Use   ? Smoking status: Never Smoker   ? Smokeless tobacco: Never Used   Substance and Sexual Activity   ? Alcohol use: No   ? Drug use: No   ? Sexual activity: Not on file   Lifestyle   ? Physical activity     Days per week: Not on file     Minutes per session: Not on file   ? Stress: Not on file   Relationships   ? Social connections     Talks on phone: Not on file     Gets together: Not on file     Attends Synagogue service: Not on file     Active member of club or organization: Not on file     Attends meetings of clubs or organizations: Not on file     Relationship status: Not on file   ? Intimate partner violence     Fear of current or ex partner: Not on file     Emotionally abused: Not on file     Physically abused: Not on file     Forced sexual activity: Not on file   Other Topics Concern   ? Not on file   Social History Narrative   ? Not on file       VITALS:  Patient Vitals for the past 24 hrs:   BP  "Temp Temp src Pulse Resp SpO2 Height Weight   07/06/21 1046 121/60 -- -- 67 -- 99 % -- --   07/06/21 0849 125/84 98  F (36.7  C) Oral 74 18 98 % 5' 4\" (1.626 m) 154 lb (69.9 kg)       PHYSICAL EXAM    Constitutional: Well developed, Well nourished, NAD  HENT: Normocephalic, Atraumatic, Bilateral external ears normal, Oropharynx normal, mucous membranes moist, Nose normal.   Neck-  Normal range of motion, No tenderness, Supple, No stridor.    Eyes: PERRL, Conjunctiva normal, No discharge.   Respiratory: Normal breath sounds, No respiratory distress, No wheezing, Speaks full sentences easily. No cough.    Cardiovascular: Normal heart rate, Regular rhythm,  No murmurs, No rubs, No gallops. Chest wall nontender.    GI: Soft, No masses, No flank tenderness. No rebound or guarding. Mild LLQ tenderness.  Musculoskeletal: 2+ DP pulses. No edema.  No cyanosis, No clubbing. Good range of motion in all major joints. No tenderness to palpation or major deformities noted. No tenderness of the CTLS spine.    Integument: Warm, Dry, No erythema, No rash. No petechiae.    Neurologic: Alert & oriented x 3, Normal motor function, Normal sensory function, No focal deficits noted. Normal gait.    Psychiatric: Affect normal, Judgment normal, Mood normal. Cooperative.     LAB:  All pertinent labs reviewed and interpreted.  Results for orders placed or performed during the hospital encounter of 07/06/21   Comprehensive Metabolic Panel   Result Value Ref Range    Sodium 137 136 - 145 mmol/L    Potassium 4.2 3.5 - 5.0 mmol/L    Chloride 107 98 - 107 mmol/L    CO2 22 22 - 31 mmol/L    Anion Gap, Calculation 8 5 - 18 mmol/L    Glucose 115 70 - 125 mg/dL    BUN 7 (L) 8 - 22 mg/dL    Creatinine 1.06 0.70 - 1.30 mg/dL    GFR MDRD Af Amer >60 >60 mL/min/1.73m2    GFR MDRD Non Af Amer >60 >60 mL/min/1.73m2    Bilirubin, Total 0.5 0.0 - 1.0 mg/dL    Calcium 8.8 8.5 - 10.5 mg/dL    Protein, Total 7.0 6.0 - 8.0 g/dL    Albumin 3.9 3.5 - 5.0 g/dL    " Alkaline Phosphatase 62 45 - 120 U/L    AST 21 0 - 40 U/L    ALT 25 0 - 45 U/L   Lipase   Result Value Ref Range    Lipase 52 0 - 52 U/L   C-Reactive Protein   Result Value Ref Range    CRP 4.7 (H) 0.0 - 0.8 mg/dL   Urinalysis-UC if Indicated   Result Value Ref Range    Color, UA Colorless Light Yellow, Yellow    Clarity, UA Clear Clear    Glucose, UA Negative Negative    Protein, UA Negative Negative    Bilirubin, UA Negative Negative    Urobilinogen, UA <2.0 mg/dL <2.0 mg/dL    pH, UA 5.5 5.0 - 8.0    Blood, UA Negative Negative    Ketones, UA Negative Negative    Nitrite, UA Negative Negative    Leukocytes, UA Negative Negative    Specific Gravity, UA 1.005 1.001 - 1.030   HM1 (CBC with Diff)   Result Value Ref Range    WBC 4.2 4.0 - 11.0 thou/uL    RBC 4.20 (L) 4.40 - 6.20 mill/uL    Hemoglobin 12.0 (L) 14.0 - 18.0 g/dL    Hematocrit 36.4 (L) 40.0 - 54.0 %    MCV 87 80 - 100 fL    MCH 28.6 27.0 - 34.0 pg    MCHC 33.0 32.0 - 36.0 g/dL    RDW 14.1 11.0 - 14.5 %    Platelets 110 (L) 140 - 440 thou/uL    MPV 14.2 (H) 8.5 - 12.5 fL    Neutrophils % 66 50 - 70 %    Lymphocytes % 17 (L) 20 - 40 %    Monocytes % 14 (H) 2 - 10 %    Eosinophils % 2 0 - 6 %    Basophils % 1 0 - 2 %    Immature Granulocyte % 0 <=0 %    Neutrophils Absolute 2.8 2.0 - 7.7 thou/uL    Lymphocytes Absolute 0.7 (L) 0.8 - 4.4 thou/uL    Monocytes Absolute 0.6 0.0 - 0.9 thou/uL    Eosinophils Absolute 0.1 0.0 - 0.4 thou/uL    Basophils Absolute 0.0 0.0 - 0.2 thou/uL    Immature Granulocyte Absolute 0.0 <=0.0 thou/uL       RADIOLOGY:  Reviewed all pertinent imaging. Please see official radiology report.  Xr Abdomen 2 Views    Result Date: 7/6/2021  EXAM: XR ABDOMEN 2 VIEWS LOCATION: [facility] DATE/TIME: 7/6/2021 10:28 AM INDICATION: abdominal pain COMPARISON: CT abdomen pelvis dated 06/04/2021.     No bowel obstruction or free intraperitoneal air. No radiopaque kidney stone or gallstone. Probable phleboliths in the pelvis.  No bony fracture.       I, Arelis Winslow, am serving as a scribe to document services personally performed by Meseret Esquivel PA-C based on my observation and the provider's statements to me. I, Meseret Esquivel PA-C attest that Arelis Winslow is acting in a scribe capacity, has observed my performance of the services and has documented them in accordance with my direction.    Meseret Esquivel PA-C  Emergency Medicine  [facility]       Meseret Esquivel PA-C  07/06/21 1106

## 2021-07-06 NOTE — ED NOTES
ED Notes by Arthur Webster RN at 7/6/2021  9:39 AM     Author: Arthur Webster RN Service: Emergency Medicine Author Type: Registered Nurse    Filed: 7/6/2021  9:40 AM Date of Service: 7/6/2021  9:39 AM Status: Signed    : Arthur Webster RN (Registered Nurse)       Pt has a colonoscopy scheduled for Thursday.  He has been doing the week long prep and ate solid foods last yesterday.  Today he only gets gatorade and miralax.  Having low abd pain.  Diarrhea and no nausea.

## 2021-07-06 NOTE — ED TRIAGE NOTES
ED Triage Notes by Marlene Manning, RN at 7/6/2021  8:47 AM     Author: Marlene Manning RN Service: Emergency Medicine Author Type: Registered Nurse    Filed: 7/6/2021  8:49 AM Date of Service: 7/6/2021  8:47 AM Status: Signed    : Marlene Manning RN (Registered Nurse)       Lower abd pain since 0600  Reports 3 episodes of diarrhea, no n/v  Denies any urinary symptoms  Colonoscopy scheduled in 2 days for same symptoms

## 2021-07-08 ENCOUNTER — RECORDS - HEALTHEAST (OUTPATIENT)
Dept: ADMINISTRATIVE | Facility: OTHER | Age: 61
End: 2021-07-08

## 2021-07-10 VITALS — WEIGHT: 154 LBS | BODY MASS INDEX: 26.29 KG/M2 | HEIGHT: 64 IN

## 2021-07-19 ENCOUNTER — OFFICE VISIT (OUTPATIENT)
Dept: FAMILY MEDICINE | Facility: CLINIC | Age: 61
End: 2021-07-19
Payer: COMMERCIAL

## 2021-07-19 VITALS
BODY MASS INDEX: 26.86 KG/M2 | WEIGHT: 156.5 LBS | SYSTOLIC BLOOD PRESSURE: 120 MMHG | HEART RATE: 68 BPM | DIASTOLIC BLOOD PRESSURE: 72 MMHG

## 2021-07-19 DIAGNOSIS — D12.6 ADENOMATOUS POLYP OF COLON, UNSPECIFIED PART OF COLON: ICD-10-CM

## 2021-07-19 DIAGNOSIS — K59.00 CONSTIPATION, UNSPECIFIED CONSTIPATION TYPE: Primary | ICD-10-CM

## 2021-07-19 DIAGNOSIS — D64.9 ANEMIA, UNSPECIFIED TYPE: ICD-10-CM

## 2021-07-19 DIAGNOSIS — K76.0 HEPATIC STEATOSIS: ICD-10-CM

## 2021-07-19 DIAGNOSIS — R10.30 LOWER ABDOMINAL PAIN: ICD-10-CM

## 2021-07-19 DIAGNOSIS — M06.4 INFLAMMATORY POLYARTHROPATHY OF HAND (H): ICD-10-CM

## 2021-07-19 DIAGNOSIS — K21.9 GASTROESOPHAGEAL REFLUX DISEASE, UNSPECIFIED WHETHER ESOPHAGITIS PRESENT: ICD-10-CM

## 2021-07-19 DIAGNOSIS — E55.9 VITAMIN D DEFICIENCY: ICD-10-CM

## 2021-07-19 DIAGNOSIS — M25.50 POLYARTHRALGIA: ICD-10-CM

## 2021-07-19 LAB
FERRITIN SERPL-MCNC: 88 NG/ML (ref 27–300)
IRON SERPL-MCNC: 85 UG/DL (ref 42–175)
VIT B12 SERPL-MCNC: 456 PG/ML (ref 213–816)

## 2021-07-19 PROCEDURE — 82607 VITAMIN B-12: CPT | Performed by: FAMILY MEDICINE

## 2021-07-19 PROCEDURE — 83540 ASSAY OF IRON: CPT | Performed by: FAMILY MEDICINE

## 2021-07-19 PROCEDURE — 99214 OFFICE O/P EST MOD 30 MIN: CPT | Performed by: FAMILY MEDICINE

## 2021-07-19 PROCEDURE — 82728 ASSAY OF FERRITIN: CPT | Performed by: FAMILY MEDICINE

## 2021-07-19 PROCEDURE — 36415 COLL VENOUS BLD VENIPUNCTURE: CPT | Performed by: FAMILY MEDICINE

## 2021-07-19 RX ORDER — FAMOTIDINE 40 MG/1
40 TABLET, FILM COATED ORAL EVERY EVENING
Qty: 30 TABLET | Refills: 5 | Status: SHIPPED | OUTPATIENT
Start: 2021-07-19 | End: 2022-01-27

## 2021-07-19 RX ORDER — ACETAMINOPHEN 500 MG
1000 TABLET ORAL EVERY 6 HOURS PRN
Qty: 100 TABLET | Refills: 1 | Status: SHIPPED | OUTPATIENT
Start: 2021-07-19 | End: 2022-05-06

## 2021-07-19 RX ORDER — CHOLECALCIFEROL (VITAMIN D3) 50 MCG
1 TABLET ORAL DAILY
COMMUNITY
Start: 2021-06-21 | End: 2022-01-26

## 2021-07-19 RX ORDER — IBUPROFEN 600 MG/1
TABLET, FILM COATED ORAL
Qty: 60 TABLET | Refills: 3 | Status: SHIPPED | OUTPATIENT
Start: 2021-07-19 | End: 2022-10-27

## 2021-07-19 RX ORDER — POLYETHYLENE GLYCOL 3350 17 G/17G
17 POWDER, FOR SOLUTION ORAL 2 TIMES DAILY
Qty: 765 G | Refills: 5 | Status: SHIPPED | OUTPATIENT
Start: 2021-07-19 | End: 2022-05-06

## 2021-07-19 RX ORDER — DICYCLOMINE HCL 20 MG
TABLET ORAL
COMMUNITY
Start: 2020-08-06 | End: 2022-05-06

## 2021-07-19 RX ORDER — PANTOPRAZOLE SODIUM 40 MG/1
40 TABLET, DELAYED RELEASE ORAL DAILY
Qty: 30 TABLET | Refills: 5 | Status: SHIPPED | OUTPATIENT
Start: 2021-07-19 | End: 2022-01-27

## 2021-07-19 RX ORDER — HYDROCORTISONE ACETATE, PRAMOXINE HCL 2.5; 1 G/100G; G/100G
CREAM TOPICAL
COMMUNITY
Start: 2020-04-09 | End: 2022-05-06

## 2021-07-19 RX ORDER — OXYCODONE HYDROCHLORIDE 5 MG/1
TABLET ORAL
COMMUNITY
Start: 2021-03-16 | End: 2021-07-19

## 2021-07-19 RX ORDER — SUCRALFATE 1 G/1
TABLET ORAL
Qty: 120 TABLET | Refills: 3 | Status: SHIPPED | OUTPATIENT
Start: 2021-07-19 | End: 2022-01-27

## 2021-07-19 RX ORDER — DOCUSATE SODIUM 100 MG/1
CAPSULE, LIQUID FILLED ORAL
Qty: 60 CAPSULE | Refills: 3 | Status: SHIPPED | OUTPATIENT
Start: 2021-07-19 | End: 2021-12-08

## 2021-07-19 NOTE — PROGRESS NOTES
"    Assessment & Plan     Constipation, unspecified constipation type  Continue on high-fiber diet with increased fruits and vegetables increase water.  Use docusate and MiraLAX as outlined    Adenomatous polyp of colon, unspecified part of colon  Follow-up colonoscopy July 2026    Hepatic steatosis  Has been stable with diet last liver function tests in May were normal    Anemia, unspecified type  Hemoglobin 12.0 on 7/6/2021 we will recheck that along with iron ferritin and B12  - Iron  - Ferritin  - Vitamin B12  -CBC with platelet  Inflammatory polyarthropathy of hand (H)  Patient some hydroxychloroquine continue to see rheumatology  - aspirin (ASA) 81 MG EC tablet  Dispense: 90 tablet; Refill: 1    Lower abdominal pain  Improved with resolution of constipation.  Can use Tylenol as needed for any pains  - acetaminophen (TYLENOL) 500 MG tablet  Dispense: 100 tablet; Refill: 1    Vitamin D deficiency  Refill on med  - cholecalciferol 50 MCG (2000 UT) CAPS  Dispense: 30 capsule; Refill: 5    Polyarthralgia  Stable on present meds  - ibuprofen (ADVIL/MOTRIN) 600 MG tablet  Dispense: 60 tablet; Refill: 3    Gastroesophageal reflux disease, unspecified whether esophagitis present  Uses Protonix daily as needed sulfate.  - pantoprazole (PROTONIX) 40 MG EC tablet  Dispense: 30 tablet; Refill: 5  - sucralfate (CARAFATE) 1 GM tablet  Dispense: 120 tablet; Refill: 3      Patient be contacted with results and discuss follow-up     BMI:   Estimated body mass index is 26.86 kg/m  as calculated from the following:    Height as of 7/6/21: 1.626 m (5' 4\").    Weight as of this encounter: 71 kg (156 lb 8 oz).         Return in about 6 months (around 1/19/2022) for Follow up.    Max Mart MD  [facility]    Yang Jeffries is a 60 year old who presents for the following health issues     HPI   This patient comes in for follow-up evaluation.  Patient was evaluated on 5/3/2021 had elevated " triglycerides otherwise normal lipid, normal PSA and normal LFT.    Patient does have chronic issues with inflammatory polyarthropathy he is on hydroxyzine.    Also history of hepatic steatosis in the past.    He has had adenomatous colon polyps he had a colonoscopy in July 2021 due again July 2026.    Patient has had intermittent abdominal pain he feels like all his pain went away after prepping for the colonoscopy.    He had a CT scan of his abdomen in 6/5/2021 that was unremarkable also had a plain x-ray on 7/6/2021 when he was seen at the emergency room.  That was also unremarkable    At that time hemoglobin 12.0 platelets 110,000 white count 4200 urine normal lipase normal.    I did recheck CBC today with iron ferritin and B12 as well    Patient's been taking docusate as well as MiraLAX he feels like that is been stable.  He has been on the MiraLAX twice daily if he gets enough medication I did refill all his meds today please see above.      Review of Systems    10 point review of systems positive as outlined above otherwise negative    Objective    /72   Pulse 68   Wt 71 kg (156 lb 8 oz)   BMI 26.86 kg/m    Body mass index is 26.86 kg/m .  Physical Exam    General appearance no acute distress    HEENT: Neck nontender oropharynx clear    Abdomen soft nontender    Lungs clear no rales or rhonchi heart regular S1-S2    No abdominal masses    Extremities without edema    No active synovitis changes.

## 2021-07-20 LAB
ERYTHROCYTE [DISTWIDTH] IN BLOOD BY AUTOMATED COUNT: 14 % (ref 10–15)
HCT VFR BLD AUTO: 38.1 % (ref 40–53)
HGB BLD-MCNC: 12.8 G/DL (ref 13.3–17.7)
MCH RBC QN AUTO: 29.3 PG (ref 26.5–33)
MCHC RBC AUTO-ENTMCNC: 33.6 G/DL (ref 31.5–36.5)
MCV RBC AUTO: 87 FL (ref 78–100)
PLATELET # BLD AUTO: 208 10E3/UL (ref 150–450)
RBC # BLD AUTO: 4.37 10E6/UL (ref 4.4–5.9)
WBC # BLD AUTO: 5.6 10E3/UL (ref 4–11)

## 2021-07-20 PROCEDURE — 36415 COLL VENOUS BLD VENIPUNCTURE: CPT | Performed by: FAMILY MEDICINE

## 2021-07-20 PROCEDURE — 85027 COMPLETE CBC AUTOMATED: CPT | Performed by: FAMILY MEDICINE

## 2021-08-12 ENCOUNTER — TRANSFERRED RECORDS (OUTPATIENT)
Dept: HEALTH INFORMATION MANAGEMENT | Facility: CLINIC | Age: 61
End: 2021-08-12

## 2021-08-25 ENCOUNTER — OFFICE VISIT (OUTPATIENT)
Dept: FAMILY MEDICINE | Facility: CLINIC | Age: 61
End: 2021-08-25
Payer: COMMERCIAL

## 2021-08-25 VITALS
BODY MASS INDEX: 26.95 KG/M2 | DIASTOLIC BLOOD PRESSURE: 74 MMHG | TEMPERATURE: 97.6 F | SYSTOLIC BLOOD PRESSURE: 119 MMHG | RESPIRATION RATE: 14 BRPM | HEART RATE: 84 BPM | WEIGHT: 157 LBS

## 2021-08-25 DIAGNOSIS — H60.393 INFECTIVE OTITIS EXTERNA, BILATERAL: Primary | ICD-10-CM

## 2021-08-25 PROCEDURE — 99213 OFFICE O/P EST LOW 20 MIN: CPT | Performed by: FAMILY MEDICINE

## 2021-08-25 RX ORDER — NEOMYCIN SULFATE, POLYMYXIN B SULFATE AND HYDROCORTISONE 10; 3.5; 1 MG/ML; MG/ML; [USP'U]/ML
3 SUSPENSION/ DROPS AURICULAR (OTIC) 3 TIMES DAILY
Qty: 10 ML | Refills: 1 | Status: SHIPPED | OUTPATIENT
Start: 2021-08-25 | End: 2022-05-06

## 2021-08-25 NOTE — PROGRESS NOTES
Assessment & Plan        ICD-10-CM    1. Infective otitis externa, bilateral  H60.393 neomycin-polymyxin-hydrocortisone (CORTISPORIN) 3.5-04683-7 otic suspension        Avoid Q-tips    Treat with neomycin polymyxin HC otic suspension 3 drops 3 times daily to each ear.    Not improving follow-up      Return in about 1 year (around 8/25/2022) for Routine preventive. for recheck.        Subjective:    This 60 year old male was seen today for evaluation of his ears.    He has had pain in both ears for the last couple weeks no drainage no congestion no sore throat.    He has not been swimming    Has used Q-tips in the past.    He denies any fever denies any COVID-19 exposure normal smell and taste no coughing    Review of Systems    10 point review of systems positive as outlined above otherwise negative    Current Outpatient Medications   Medication     acetaminophen (TYLENOL) 500 MG tablet     aspirin (ASA) 81 MG EC tablet     cholecalciferol 50 MCG (2000 UT) CAPS     dicyclomine (BENTYL) 20 MG tablet     docusate sodium (COLACE) 100 MG capsule     famotidine (PEPCID) 40 MG tablet     gabapentin (NEURONTIN) 100 MG capsule     hydrocortisone-pramoxine (ANALPRAM-HC) 2.5-1 % rectal cream     hydrOXYchloroQUINE (PLAQUENIL) 200 mg tablet     ibuprofen (ADVIL/MOTRIN) 600 MG tablet     lidocaine (XYLOCAINE) 5 % ointment     neomycin-polymyxin-hydrocortisone (CORTISPORIN) 3.5-83692-4 otic suspension     pantoprazole (PROTONIX) 40 MG EC tablet     polyethylene glycol (MIRALAX) 17 GM/Dose powder     Pramoxine-HC (HYDROCORTISONE ACE-PRAMOXINE) 2.5-1 % CREA     propranoloL (INDERAL LA) 60 mg 24 hr capsule     sucralfate (CARAFATE) 1 GM tablet     VITAMIN D3 50 MCG (2000 UT) tablet     No current facility-administered medications for this visit.       Objective    Vitals: /74 (BP Location: Left arm, Patient Position: Sitting, Cuff Size: Adult Regular)   Pulse 84   Temp 97.6  F (36.4  C) (Temporal)   Resp 14   Wt 71.2 kg  (157 lb)   BMI 26.95 kg/m    BMI= Body mass index is 26.95 kg/m .     Physical Exam    General appearance no acute distress    HEENT patient has some narrow canal both ears no wax on the right minimal wax on the left canal TMs were normal    No nasal drainage oropharynx clear    Lungs clear no rales or rhonchi    Heart regular no murmur    Skin normal no rash        Max Mart MD

## 2021-09-04 ENCOUNTER — HOSPITAL ENCOUNTER (EMERGENCY)
Facility: HOSPITAL | Age: 61
Discharge: HOME | End: 2021-09-04
Attending: EMERGENCY MEDICINE | Admitting: EMERGENCY MEDICINE
Payer: COMMERCIAL

## 2021-09-04 ENCOUNTER — APPOINTMENT (OUTPATIENT)
Dept: RADIOLOGY | Facility: HOSPITAL | Age: 61
End: 2021-09-04
Attending: EMERGENCY MEDICINE
Payer: COMMERCIAL

## 2021-09-04 VITALS
OXYGEN SATURATION: 98 % | TEMPERATURE: 97.2 F | BODY MASS INDEX: 26.8 KG/M2 | HEIGHT: 64 IN | WEIGHT: 157 LBS | HEART RATE: 71 BPM | RESPIRATION RATE: 12 BRPM

## 2021-09-04 DIAGNOSIS — K29.00 ACUTE GASTRITIS WITHOUT HEMORRHAGE, UNSPECIFIED GASTRITIS TYPE: ICD-10-CM

## 2021-09-04 LAB
ANION GAP SERPL CALCULATED.3IONS-SCNC: 10 MMOL/L (ref 5–18)
ATRIAL RATE - MUSE: 68 BPM
BASOPHILS # BLD AUTO: 0.1 10E3/UL (ref 0–0.2)
BASOPHILS NFR BLD AUTO: 1 %
BUN SERPL-MCNC: 7 MG/DL (ref 8–22)
CALCIUM SERPL-MCNC: 9.2 MG/DL (ref 8.5–10.5)
CHLORIDE BLD-SCNC: 109 MMOL/L (ref 98–107)
CO2 SERPL-SCNC: 23 MMOL/L (ref 22–31)
CREAT SERPL-MCNC: 1.14 MG/DL (ref 0.7–1.3)
DIASTOLIC BLOOD PRESSURE - MUSE: NORMAL MMHG
EOSINOPHIL # BLD AUTO: 0.3 10E3/UL (ref 0–0.7)
EOSINOPHIL NFR BLD AUTO: 5 %
ERYTHROCYTE [DISTWIDTH] IN BLOOD BY AUTOMATED COUNT: 13.4 % (ref 10–15)
GFR SERPL CREATININE-BSD FRML MDRD: 70 ML/MIN/1.73M2
GLUCOSE BLD-MCNC: 120 MG/DL (ref 70–125)
HCT VFR BLD AUTO: 38 % (ref 40–53)
HGB BLD-MCNC: 12.5 G/DL (ref 13.3–17.7)
IMM GRANULOCYTES # BLD: 0 10E3/UL
IMM GRANULOCYTES NFR BLD: 0 %
INTERPRETATION ECG - MUSE: NORMAL
LYMPHOCYTES # BLD AUTO: 1.2 10E3/UL (ref 0.8–5.3)
LYMPHOCYTES NFR BLD AUTO: 25 %
MAGNESIUM SERPL-MCNC: 2.1 MG/DL (ref 1.8–2.6)
MCH RBC QN AUTO: 28.9 PG (ref 26.5–33)
MCHC RBC AUTO-ENTMCNC: 32.9 G/DL (ref 31.5–36.5)
MCV RBC AUTO: 88 FL (ref 78–100)
MONOCYTES # BLD AUTO: 0.4 10E3/UL (ref 0–1.3)
MONOCYTES NFR BLD AUTO: 8 %
NEUTROPHILS # BLD AUTO: 3.1 10E3/UL (ref 1.6–8.3)
NEUTROPHILS NFR BLD AUTO: 61 %
NRBC # BLD AUTO: 0 10E3/UL
NRBC BLD AUTO-RTO: 0 /100
P AXIS - MUSE: 25 DEGREES
PLATELET # BLD AUTO: 145 10E3/UL (ref 150–450)
POTASSIUM BLD-SCNC: 3.8 MMOL/L (ref 3.5–5)
PR INTERVAL - MUSE: 166 MS
QRS DURATION - MUSE: 92 MS
QT - MUSE: 370 MS
QTC - MUSE: 393 MS
R AXIS - MUSE: 20 DEGREES
RBC # BLD AUTO: 4.32 10E6/UL (ref 4.4–5.9)
SODIUM SERPL-SCNC: 142 MMOL/L (ref 136–145)
SYSTOLIC BLOOD PRESSURE - MUSE: NORMAL MMHG
T AXIS - MUSE: 27 DEGREES
TROPONIN I SERPL-MCNC: <0.01 NG/ML (ref 0–0.29)
VENTRICULAR RATE- MUSE: 68 BPM
WBC # BLD AUTO: 5 10E3/UL (ref 4–11)

## 2021-09-04 PROCEDURE — 85025 COMPLETE CBC W/AUTO DIFF WBC: CPT | Performed by: EMERGENCY MEDICINE

## 2021-09-04 PROCEDURE — 80048 BASIC METABOLIC PNL TOTAL CA: CPT | Performed by: EMERGENCY MEDICINE

## 2021-09-04 PROCEDURE — 250N000013 HC RX MED GY IP 250 OP 250 PS 637: Performed by: EMERGENCY MEDICINE

## 2021-09-04 PROCEDURE — 250N000009 HC RX 250: Performed by: EMERGENCY MEDICINE

## 2021-09-04 PROCEDURE — 99285 EMERGENCY DEPT VISIT HI MDM: CPT | Mod: 25

## 2021-09-04 PROCEDURE — 71045 X-RAY EXAM CHEST 1 VIEW: CPT

## 2021-09-04 PROCEDURE — 93005 ELECTROCARDIOGRAM TRACING: CPT | Performed by: STUDENT IN AN ORGANIZED HEALTH CARE EDUCATION/TRAINING PROGRAM

## 2021-09-04 PROCEDURE — 84484 ASSAY OF TROPONIN QUANT: CPT | Performed by: EMERGENCY MEDICINE

## 2021-09-04 PROCEDURE — 83735 ASSAY OF MAGNESIUM: CPT | Performed by: EMERGENCY MEDICINE

## 2021-09-04 PROCEDURE — 36415 COLL VENOUS BLD VENIPUNCTURE: CPT | Performed by: EMERGENCY MEDICINE

## 2021-09-04 RX ADMIN — LIDOCAINE HYDROCHLORIDE: 20 SOLUTION ORAL; TOPICAL at 20:11

## 2021-09-04 ASSESSMENT — MIFFLIN-ST. JEOR: SCORE: 1443.15

## 2021-09-05 NOTE — ED PROVIDER NOTES
EMERGENCY DEPARTMENT ENCOUNTER      NAME: Nesha Newton  AGE: 60 year old male  YOB: 1960  MRN: [id number]  EVALUATION DATE & TIME: 9/4/2021  7:12 PM    PCP: Max Mart    ED PROVIDER: Mary Sanders M.D.      Chief Complaint   Patient presents with     Chest Pain     FINAL IMPRESSION:  1. Acute gastritis without hemorrhage, unspecified gastritis type      ED COURSE & MEDICAL DECISION MAKING:    Pertinent Labs & Imaging studies reviewed. (See chart for details)  ED Course as of Sep 04 2236   Sat Sep 04, 2021   1940 Patient is a 60-year-old male who comes in today with some chest discomfort.  Has been going on for 3 days.  Is located in the center of his chest and he has tenderness to palpation.  He denies any nausea or vomiting or shortness of breath.  Has been taking medications at home without good relief of his symptoms.  It is worse with just about everything he does.  I asked him if it was worse with exertion he said yes but it sounds like is also worse with laying down and walking and sitting around.  His EKG is unremarkable.  We will check some blood work and look for any signs of anything cardiac that could be contributing.  I am suspecting this is more of a chest wall pain whether he has some costochondritis or something like that going on.  Another possibility is some gastritis or GERD contributing.  He is on medications for that.  We will start with a GI cocktail and see if it makes a difference and if that does not help then we can try some other medications.  I discussed this with him and he is in agreement with the plan.      2104 Work-up so far has been unremarkable.  I will see how he feels after the GI cocktail.      2147 I just checked back in on the patient.  He is feeling much better.  I suspect he is got some gastritis.  He is prescribed sucralfate but not taking it like he is supposed to.  He is also on Protonix and famotidine.  I told them to make sure he is taking  those appropriately and to take the sucralfate appropriately.  Hopefully that will help his symptoms.  He can follow-up with his regular doctor.  He is in agreement with the plan will be discharged home.          7:20 PM Met with patient for initial evaluation and discussed plan for care in the Emergency Department.   9:40 PM Updated patient on imaging and laboratory results.     At the conclusion of the encounter I discussed  the results of all of the tests and the disposition with patient.   All questions were answered.  The patient acknowledged understanding and was involved in the decision making regarding the overall care plan.      I discussed with patient the utility, limitations and findings of the exam/interventions/studies done during this visit as well as the list of differential diagnosis and symptoms to monitor/return to ER for.  Additional verbal discharge instructions were provided.     MEDICATIONS GIVEN IN THE EMERGENCY:  Medications   lidocaine (XYLOCAINE) 2 % 15 mL, alum & mag hydroxide-simethicone (MAALOX) 15 mL GI Cocktail ( Oral Given 9/4/21 2011)       NEW PRESCRIPTIONS STARTED AT TODAY'S ER VISIT  Discharge Medication List as of 9/4/2021  9:51 PM         =================================================================    HPI    Triage Note: Pt has had midsternal cp that goes into the back for the last 3 days. Tylenol has helped some.      Patient information was obtained from: patient    Use of : Yes, phone, language: Kiswahili      Nesha Newton is a 60 year old male with a history of chronic eosinophilic pneumonia and tremor who presents with chest pain.     The patient has had chest pain for the past 3 days. He has bee taking medications for gastritis and reflux with no relief. The chest pain began as intermittent but is now constant. He has been unable to sleep due to the pain. There are no relieving or exacerbating factors. It occasionally radiates to his back. The pain does  not radiate to his arm or jaw. He has mild abdominal tenderness. He denies recent injury. He also denies nausea, dizziness, shortness of breath, cough, fever, or any other complaints.     REVIEW OF SYSTEMS   Except as stated in the HPI all other systems reviewed and are negative.    PAST MEDICAL HISTORY:  Past Medical History:   Diagnosis Date     Chronic gastritis without bleeding, unspecified gastritis type 4/3/2018     Coccidioidomycosis, unspecified     Created by Conversion      GERD (gastroesophageal reflux disease)      Hepatic steatosis 4/24/2017    HCV neg,      Osteoarthrosis involving lower leg     Created by Conversion  Replacement Utility updated for latest IMO load     Polyarthralgia 4/5/2017     Vitamin D deficiency        PAST SURGICAL HISTORY:  History reviewed. No pertinent surgical history.    CURRENT MEDICATIONS:    No current facility-administered medications for this encounter.    Current Outpatient Medications:      acetaminophen (TYLENOL) 500 MG tablet, Take 2 tablets (1,000 mg) by mouth every 6 hours as needed, Disp: 100 tablet, Rfl: 1     aspirin (ASA) 81 MG EC tablet, Take 1 tablet (81 mg) by mouth daily, Disp: 90 tablet, Rfl: 1     cholecalciferol 50 MCG (2000 UT) CAPS, Take 2,000 Units by mouth daily, Disp: 30 capsule, Rfl: 5     dicyclomine (BENTYL) 20 MG tablet, TAKE 1 TABLET BY MOUTH 4 TIMES DAILY AS NEEDED FOR ABDOMINAL PAIN CRAMPING, Disp: , Rfl:      docusate sodium (COLACE) 100 MG capsule, [DOCUSATE SODIUM (COLACE) 100 MG CAPSULE] 1 po 2x per day as needed for constipation, Disp: 60 capsule, Rfl: 3     famotidine (PEPCID) 40 MG tablet, Take 1 tablet (40 mg) by mouth every evening, Disp: 30 tablet, Rfl: 5     gabapentin (NEURONTIN) 100 MG capsule, [GABAPENTIN (NEURONTIN) 100 MG CAPSULE] Take 100 mg by mouth 3 (three) times a day., Disp: 90 capsule, Rfl: 2     hydrocortisone-pramoxine (ANALPRAM-HC) 2.5-1 % rectal cream, [HYDROCORTISONE-PRAMOXINE (ANALPRAM-HC) 2.5-1 % RECTAL CREAM]  Insert into the rectum 3 (three) times a day. Apply  Externally bid as directed, Disp: 30 g, Rfl: 1     hydrOXYchloroQUINE (PLAQUENIL) 200 mg tablet, [HYDROXYCHLOROQUINE (PLAQUENIL) 200 MG TABLET] Take 1 tablet (200 mg total) by mouth daily., Disp: 90 tablet, Rfl: 1     ibuprofen (ADVIL/MOTRIN) 600 MG tablet, [IBUPROFEN (ADVIL,MOTRIN) 600 MG TABLET] TAKE 1 TABLET BY MOUTH THREE TIMES DAILY AS NEEDED FOR PAIN, Disp: 60 tablet, Rfl: 3     lidocaine (XYLOCAINE) 5 % ointment, [LIDOCAINE (XYLOCAINE) 5 % OINTMENT] Apply two times a day prn, Disp: 35.44 g, Rfl: 1     neomycin-polymyxin-hydrocortisone (CORTISPORIN) 3.5-91271-4 otic suspension, Place 3 drops into both ears 3 times daily, Disp: 10 mL, Rfl: 1     pantoprazole (PROTONIX) 40 MG EC tablet, Take 1 tablet (40 mg) by mouth daily, Disp: 30 tablet, Rfl: 5     polyethylene glycol (MIRALAX) 17 GM/Dose powder, Take 17 g by mouth 2 times daily, Disp: 765 g, Rfl: 5     Pramoxine-HC (HYDROCORTISONE ACE-PRAMOXINE) 2.5-1 % CREA, , Disp: , Rfl:      propranoloL (INDERAL LA) 60 mg 24 hr capsule, [PROPRANOLOL (INDERAL LA) 60 MG 24 HR CAPSULE] Take 1 capsule (60 mg total) by mouth daily., Disp: 30 capsule, Rfl: 5     sucralfate (CARAFATE) 1 GM tablet, [SUCRALFATE (CARAFATE) 1 GRAM TABLET] 1 po four times a day before meals and at bedtime as needed for reflux, Disp: 120 tablet, Rfl: 3     VITAMIN D3 50 MCG (2000 UT) tablet, Take 1 tablet by mouth daily, Disp: , Rfl:     ALLERGIES:  Allergies   Allergen Reactions     Tramadol Itching     Pt states itchy all over     Primidone Other (See Comments)     Dizziness and vomitting       FAMILY HISTORY:  Family History   Problem Relation Age of Onset     Diabetes Mother        SOCIAL HISTORY:   Social History     Socioeconomic History     Marital status:      Spouse name: Not on file     Number of children: Not on file     Years of education: Not on file     Highest education level: Not on file   Occupational History     Not on file  "  Tobacco Use     Smoking status: Never Smoker     Smokeless tobacco: Never Used   Substance and Sexual Activity     Alcohol use: No     Drug use: No     Sexual activity: Not on file   Other Topics Concern     Not on file   Social History Narrative     Not on file     Social Determinants of Health     Financial Resource Strain:      Difficulty of Paying Living Expenses:    Food Insecurity:      Worried About Running Out of Food in the Last Year:      Ran Out of Food in the Last Year:    Transportation Needs:      Lack of Transportation (Medical):      Lack of Transportation (Non-Medical):    Physical Activity:      Days of Exercise per Week:      Minutes of Exercise per Session:    Stress:      Feeling of Stress :    Social Connections:      Frequency of Communication with Friends and Family:      Frequency of Social Gatherings with Friends and Family:      Attends Mosque Services:      Active Member of Clubs or Organizations:      Attends Club or Organization Meetings:      Marital Status:    Intimate Partner Violence:      Fear of Current or Ex-Partner:      Emotionally Abused:      Physically Abused:      Sexually Abused:        PHYSICAL EXAM    VITAL SIGNS: Pulse 71   Temp 97.2  F (36.2  C) (Oral)   Resp 12   Ht 1.626 m (5' 4\")   Wt 71.2 kg (157 lb)   SpO2 98%   BMI 26.95 kg/m     GENERAL: Awake, Alert, answering questions, No acute distress, Well nourished  HEENT: Normal cephalic, Atraumatic, bilateral external ears normal, No scleral icterus, mask in place  NECK: No obvious swelling or abnormality, No stridor  PULMONARY:Normal and symmetric breath sounds, No respiratory distress, Lungs clear to auscultation bilaterally. No wheezing  CARDIOVASCULAR: Regular rate and rhythm, Distal pulses present and normal. Anterior chest wall tenderness.   ABDOMINAL: Soft, Nondistended, Nontender, No flank tenderness, No palpable masses  BACK: No bruising or tenderness.  EXTREMITIES: Moves all extremities " spontaneously, warm, no edema, No major deformities  NEURO: No facial droop, normal motor function, Normal speech   PSYCH: Normal mood and affect  SKIN: No rashes on visualized skin, dry, warm     LAB:  All pertinent labs reviewed and interpreted.  Results for orders placed or performed during the hospital encounter of 09/04/21   XR Chest Port 1 View    Impression    IMPRESSION: Minimal left basilar opacities likely related to atelectasis. The right lung is clear. No definite pleural effusion. Normal heart size.   Basic metabolic panel   Result Value Ref Range    Sodium 142 136 - 145 mmol/L    Potassium 3.8 3.5 - 5.0 mmol/L    Chloride 109 (H) 98 - 107 mmol/L    Carbon Dioxide (CO2) 23 22 - 31 mmol/L    Anion Gap 10 5 - 18 mmol/L    Urea Nitrogen 7 (L) 8 - 22 mg/dL    Creatinine 1.14 0.70 - 1.30 mg/dL    Calcium 9.2 8.5 - 10.5 mg/dL    Glucose 120 70 - 125 mg/dL    GFR Estimate 70 >60 mL/min/1.73m2   Result Value Ref Range    Troponin I <0.01 0.00 - 0.29 ng/mL   Result Value Ref Range    Magnesium 2.1 1.8 - 2.6 mg/dL   CBC with platelets and differential   Result Value Ref Range    WBC Count 5.0 4.0 - 11.0 10e3/uL    RBC Count 4.32 (L) 4.40 - 5.90 10e6/uL    Hemoglobin 12.5 (L) 13.3 - 17.7 g/dL    Hematocrit 38.0 (L) 40.0 - 53.0 %    MCV 88 78 - 100 fL    MCH 28.9 26.5 - 33.0 pg    MCHC 32.9 31.5 - 36.5 g/dL    RDW 13.4 10.0 - 15.0 %    Platelet Count 145 (L) 150 - 450 10e3/uL    % Neutrophils 61 %    % Lymphocytes 25 %    % Monocytes 8 %    % Eosinophils 5 %    % Basophils 1 %    % Immature Granulocytes 0 %    NRBCs per 100 WBC 0 <1 /100    Absolute Neutrophils 3.1 1.6 - 8.3 10e3/uL    Absolute Lymphocytes 1.2 0.8 - 5.3 10e3/uL    Absolute Monocytes 0.4 0.0 - 1.3 10e3/uL    Absolute Eosinophils 0.3 0.0 - 0.7 10e3/uL    Absolute Basophils 0.1 0.0 - 0.2 10e3/uL    Absolute Immature Granulocytes 0.0 <=0.0 10e3/uL    Absolute NRBCs 0.0 10e3/uL       RADIOLOGY:      XR Chest Port 1 View   Final Result   IMPRESSION:  Minimal left basilar opacities likely related to atelectasis. The right lung is clear. No definite pleural effusion. Normal heart size.          EKG:    Date and time: September 4, 2021 at 1915  Rate: 68 bpm  Rhythm: Normal sinus rhythm  MI interval: 166 ms  QRS interval: 92 ms  QT/QTc: 370/393 ms  ST changes or T wave changes: No acute ST or T wave abnormalities  Change from prior ECG: No significant change from prior.  I have independently reviewed and interpreted this EKG.     I, Radha Wilfred, am serving as a scribe to document services personally performed by Dr. Sanders based on my observation and the provider's statements to me. I, Mary Sanders MD attest that Radha Hardin is acting in a scribe capacity, has observed my performance of the services and has documented them in accordance with my direction.    Mary Sanders M.D.  Emergency Medicine  [facility] EMERGENCY DEPARTMENT  [facility] [facility] [id number] [phone]  Dept: [phone]     Mary Sanders MD  09/04/21 0437

## 2021-09-05 NOTE — DISCHARGE INSTRUCTIONS
You were seen in the Emergency Department today for abdominal pain.  Your lab work showed no cause of your symptoms. Your imaging studies showed no significant cause of your symptoms.  Make sure you are taking your pantoprazole, famotidine, and sucralfate as prescribed as those will help your symptoms the most. follow up with your primary care physician to ensure resolution of symptoms. Return if you have new or worsening symptoms.

## 2021-10-17 ENCOUNTER — HEALTH MAINTENANCE LETTER (OUTPATIENT)
Age: 61
End: 2021-10-17

## 2021-10-22 ENCOUNTER — TRANSFERRED RECORDS (OUTPATIENT)
Dept: HEALTH INFORMATION MANAGEMENT | Facility: CLINIC | Age: 61
End: 2021-10-22
Payer: COMMERCIAL

## 2021-12-03 ENCOUNTER — ALLIED HEALTH/NURSE VISIT (OUTPATIENT)
Dept: FAMILY MEDICINE | Facility: CLINIC | Age: 61
End: 2021-12-03
Payer: COMMERCIAL

## 2021-12-03 DIAGNOSIS — Z23 IMMUNIZATION DUE: Primary | ICD-10-CM

## 2021-12-03 PROCEDURE — 91300 PR COVID VAC PFIZER DIL RECON 30 MCG/0.3 ML IM: CPT

## 2021-12-03 PROCEDURE — 0004A PR COVID VAC PFIZER DIL RECON 30 MCG/0.3 ML IM: CPT

## 2021-12-03 PROCEDURE — 99207 PR NO CHARGE NURSE ONLY: CPT

## 2021-12-07 DIAGNOSIS — K59.00 CONSTIPATION, UNSPECIFIED CONSTIPATION TYPE: Primary | ICD-10-CM

## 2021-12-08 RX ORDER — DOCUSATE SODIUM 100 MG/1
CAPSULE, LIQUID FILLED ORAL
Qty: 60 CAPSULE | Refills: 3 | Status: SHIPPED | OUTPATIENT
Start: 2021-12-08 | End: 2022-04-14

## 2021-12-08 NOTE — TELEPHONE ENCOUNTER
"Last Written Prescription Date:  7/19/21  Last Fill Quantity: 60,  # refills: 3   Last office visit provider:  8/25/21     Routing refill request to provider for review/approval because:  No dx code associated with rx          Requested Prescriptions   Pending Prescriptions Disp Refills     docusate sodium (COLACE) 100 MG capsule 60 capsule 3     Sig: [DOCUSATE SODIUM (COLACE) 100 MG CAPSULE] 1 po 2x per day as needed for constipation       Laxatives Protocol Passed - 12/7/2021  9:21 AM        Passed - Patient is age 6 or older        Passed - Recent (12 mo) or future (30 days) visit within the authorizing provider's specialty     Patient has had an office visit with the authorizing provider or a provider within the authorizing providers department within the previous 12 mos or has a future within next 30 days. See \"Patient Info\" tab in inbasket, or \"Choose Columns\" in Meds & Orders section of the refill encounter.              Passed - Medication is active on med list             Merari Fields RN 12/08/21 11:57 AM  "

## 2021-12-23 DIAGNOSIS — M48.061 FORAMINAL STENOSIS OF LUMBAR REGION: ICD-10-CM

## 2021-12-23 RX ORDER — GABAPENTIN 100 MG/1
CAPSULE ORAL
Qty: 90 CAPSULE | Refills: 0 | Status: SHIPPED | OUTPATIENT
Start: 2021-12-23 | End: 2022-01-27

## 2021-12-31 ENCOUNTER — APPOINTMENT (OUTPATIENT)
Dept: RADIOLOGY | Facility: CLINIC | Age: 61
End: 2021-12-31
Payer: COMMERCIAL

## 2021-12-31 ENCOUNTER — HOSPITAL ENCOUNTER (EMERGENCY)
Facility: CLINIC | Age: 61
Discharge: HOME | End: 2021-12-31
Admitting: EMERGENCY MEDICINE
Payer: COMMERCIAL

## 2021-12-31 VITALS
BODY MASS INDEX: 26.29 KG/M2 | SYSTOLIC BLOOD PRESSURE: 131 MMHG | OXYGEN SATURATION: 97 % | HEART RATE: 68 BPM | DIASTOLIC BLOOD PRESSURE: 86 MMHG | RESPIRATION RATE: 16 BRPM | WEIGHT: 154 LBS | HEIGHT: 64 IN | TEMPERATURE: 97.8 F

## 2021-12-31 DIAGNOSIS — M25.519 SHOULDER PAIN: ICD-10-CM
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
## 2021-12-31 PROCEDURE — 73030 X-RAY EXAM OF SHOULDER: CPT | Mod: LT

## 2021-12-31 PROCEDURE — 73060 X-RAY EXAM OF HUMERUS: CPT | Mod: LT

## 2021-12-31 PROCEDURE — 99284 EMERGENCY DEPT VISIT MOD MDM: CPT | Mod: 25

## 2021-12-31 PROCEDURE — 250N000011 HC RX IP 250 OP 636: Performed by: EMERGENCY MEDICINE

## 2021-12-31 PROCEDURE — 250N000013 HC RX MED GY IP 250 OP 250 PS 637: Performed by: EMERGENCY MEDICINE

## 2021-12-31 PROCEDURE — 96372 THER/PROPH/DIAG INJ SC/IM: CPT | Performed by: EMERGENCY MEDICINE

## 2021-12-31 RX ORDER — KETOROLAC TROMETHAMINE 30 MG/ML
30 INJECTION, SOLUTION INTRAMUSCULAR; INTRAVENOUS ONCE
Status: COMPLETED | OUTPATIENT
Start: 2021-12-31 | End: 2021-12-31

## 2021-12-31 RX ORDER — HYDROCODONE BITARTRATE AND ACETAMINOPHEN 5; 325 MG/1; MG/1
1 TABLET ORAL ONCE
Status: COMPLETED | OUTPATIENT
Start: 2021-12-31 | End: 2021-12-31

## 2021-12-31 RX ORDER — HYDROCODONE BITARTRATE AND ACETAMINOPHEN 5; 325 MG/1; MG/1
1 TABLET ORAL EVERY 6 HOURS PRN
Qty: 4 TABLET | Refills: 0 | Status: SHIPPED | OUTPATIENT
Start: 2021-12-31 | End: 2022-01-03

## 2021-12-31 RX ADMIN — HYDROCODONE BITARTRATE AND ACETAMINOPHEN 1 TABLET: 5; 325 TABLET ORAL at 09:31

## 2021-12-31 RX ADMIN — KETOROLAC TROMETHAMINE 30 MG: 30 INJECTION, SOLUTION INTRAMUSCULAR at 09:31

## 2021-12-31 ASSESSMENT — ENCOUNTER SYMPTOMS
VOMITING: 0
NECK PAIN: 0
SHORTNESS OF BREATH: 0
NAUSEA: 0
CONFUSION: 0
ARTHRALGIAS: 1
BACK PAIN: 0

## 2021-12-31 ASSESSMENT — MIFFLIN-ST. JEOR: SCORE: 1424.54

## 2021-12-31 NOTE — ED TRIAGE NOTES
Fell yesterday injuring left shoulder. Limited range of motion. Good radial pulse.  Denies any other injury

## 2021-12-31 NOTE — ED PROVIDER NOTES
EMERGENCY DEPARTMENT ENCOUNTER      NAME: Nesha Newton  AGE: 61 year old male  YOB: 1960  MRN: [id number]  EVALUATION DATE & TIME: 12/31/2021  8:33 AM    PCP: Max Mart    ED PROVIDER: Jenniffer Meng PA-C      Chief Complaint   Patient presents with     Fall     Shoulder Injury         FINAL IMPRESSION:  1. Shoulder pain    2. Fall, initial encounter          ED COURSE & MEDICAL DECISION MAKING:    Pertinent Labs & Imaging studies reviewed. (See chart for details)    61 year old male presents to the Emergency Department for evaluation of shoulder pain after a fall.    Physical exam is remarkable for a generally well-appearing male who is in no acute distress.  He has tenderness to palpation on the left posterior and lateral shoulder.  There are no overlying skin changes, no significant swelling or effusion noted.  Normal range of motion noted but patient reports pain with full flexion.  No focal neurologic deficits, no spinal tenderness or step-offs.  Heart and lung sounds clear diffusely throughout.  Vital signs remarkable for mild hypertension but otherwise stable.  He is afebrile.    X-rays of the left humerus and shoulder are negative for acute fracture or dislocation.  Mild AC arthritis noted.    Patient was given Toradol, Norco, and ice with improvement in his symptoms here in the ED.  I do not think any further emergent labs or imaging are indicated at this time.  He was neurovascularly intact distal to the areas of pain.  He had no focal neurologic deficits on exam and no spinal tenderness or step-offs.  No head injury associated with his fall and he is not anticoagulated.  He denies any chest pain or shortness of breath and the onset of pain was clearly associated with an injury.  I suspect his symptoms are secondary to soft tissue injury like contusion or possibly rotator cuff injury.  Discussed this with the patient and recommend follow-up with clinic if his symptoms are not  improving with conservative management.  Advised treatment of pain at home with Tylenol, discussed avoidance of NSAIDs given history of severe gastritis.  Short course of Norco provided for breakthrough pain,  reviewed with no concerning narcotic prescriptions.  Recommend return to the ED for any new or worsening symptoms.  Patient is agreeable with this treatment plan and verbalized his understanding.    ED Course   8:36 AM Performed my initial history and physical exam. Discussed workup in the emergency department, management of symptoms, and likely disposition.   10:04 AM I rechecked and updated the patient with results. He is feeling improved and ready for discharge. I discussed the plan for discharge with the patient, and patient is agreeable. We discussed supportive cares at home and reasons for return to the ER including new or worsening symptoms - all questions and concerns addressed. Patient to be discharged by RN.    At the conclusion of the encounter I discussed the results of all of the tests and the disposition. The questions were answered. The patient or family acknowledged understanding and was agreeable with the care plan.     Voice recognition software was used in the creation of this note. Any grammatical or nonsensical errors are due to inherent errors with the software and are not the intention of the writer.     MEDICATIONS GIVEN IN THE EMERGENCY:  Medications   ketorolac (TORADOL) injection 30 mg (30 mg Intramuscular Given 12/31/21 0931)   HYDROcodone-acetaminophen (NORCO) 5-325 MG per tablet 1 tablet (1 tablet Oral Given 12/31/21 0931)       NEW PRESCRIPTIONS STARTED AT TODAY'S ER VISIT  New Prescriptions    HYDROCODONE-ACETAMINOPHEN (NORCO) 5-325 MG TABLET    Take 1 tablet by mouth every 6 hours as needed for pain            =================================================================    HPI    Patient information was obtained from: Patient    Use of Intrepreter: [place]ian-daughter  " at bedside, declined professional phone         Nesha Newton is a 61 year old male with PMH of polyarthralgias, chronic gastritis, BPH who presents to the ED via walk-in for evaluation of shoulder pain after a fall.    The patient reports that yesterday, he slipped on some ice and landed on his left arm.  The fall was witnessed by his daughter.  No head injury or loss of consciousness.  Today he presents for left shoulder pain that radiates from his shoulder down to his elbow.  It is very painful to lift his left arm.  He is unable to describe the character of the pain, stating \"it is just there.\"  He took some Tylenol last night without relief.  He denies any nausea, vomiting, visual changes, neck or back pain, numbness or tingling in the extremities.  He denies any chest pain or shortness of breath.  No history of injury to this shoulder before.      REVIEW OF SYSTEMS   Review of Systems   Eyes: Negative for visual disturbance.   Respiratory: Negative for shortness of breath.    Cardiovascular: Negative for chest pain.   Gastrointestinal: Negative for nausea and vomiting.   Musculoskeletal: Positive for arthralgias (Left shoulder pain). Negative for back pain and neck pain.   Neurological: Negative for syncope.   Psychiatric/Behavioral: Negative for confusion.     All other systems reviewed and are negative unless noted in HPI.      PAST MEDICAL HISTORY:  Past Medical History:   Diagnosis Date     Chronic gastritis without bleeding, unspecified gastritis type 4/3/2018     Coccidioidomycosis, unspecified     Created by Conversion      GERD (gastroesophageal reflux disease)      Hepatic steatosis 4/24/2017    HCV neg,      Osteoarthrosis involving lower leg     Created by Conversion  Replacement Utility updated for latest IMO load     Polyarthralgia 4/5/2017     Vitamin D deficiency        PAST SURGICAL HISTORY:  No past surgical history on file.    CURRENT MEDICATIONS:  "   HYDROcodone-acetaminophen (NORCO) 5-325 MG tablet  acetaminophen (TYLENOL) 500 MG tablet  aspirin (ASA) 81 MG EC tablet  cholecalciferol 50 MCG (2000 UT) CAPS  dicyclomine (BENTYL) 20 MG tablet  docusate sodium (COLACE) 100 MG capsule  famotidine (PEPCID) 40 MG tablet  gabapentin (NEURONTIN) 100 MG capsule  hydrocortisone-pramoxine (ANALPRAM-HC) 2.5-1 % rectal cream  hydrOXYchloroQUINE (PLAQUENIL) 200 mg tablet  ibuprofen (ADVIL/MOTRIN) 600 MG tablet  lidocaine (XYLOCAINE) 5 % ointment  neomycin-polymyxin-hydrocortisone (CORTISPORIN) 3.5-40976-6 otic suspension  pantoprazole (PROTONIX) 40 MG EC tablet  polyethylene glycol (MIRALAX) 17 GM/Dose powder  Pramoxine-HC (HYDROCORTISONE ACE-PRAMOXINE) 2.5-1 % CREA  propranoloL (INDERAL LA) 60 mg 24 hr capsule  sucralfate (CARAFATE) 1 GM tablet  VITAMIN D3 50 MCG (2000 UT) tablet        ALLERGIES:  Allergies   Allergen Reactions     Tramadol Itching     Pt states itchy all over     Primidone Other (See Comments)     Dizziness and vomitting       FAMILY HISTORY:  Family History   Problem Relation Age of Onset     Diabetes Mother        SOCIAL HISTORY:   Social History     Socioeconomic History     Marital status:      Spouse name: Not on file     Number of children: Not on file     Years of education: Not on file     Highest education level: Not on file   Occupational History     Not on file   Tobacco Use     Smoking status: Never Smoker     Smokeless tobacco: Never Used   Substance and Sexual Activity     Alcohol use: No     Drug use: No     Sexual activity: Not on file   Other Topics Concern     Not on file   Social History Narrative     Not on file     Social Determinants of Health     Financial Resource Strain: Not on file   Food Insecurity: Not on file   Transportation Needs: Not on file   Physical Activity: Not on file   Stress: Not on file   Social Connections: Not on file   Intimate Partner Violence: Not on file   Housing Stability: Not on file  "      VITALS:  Patient Vitals for the past 24 hrs:   BP Temp Temp src Pulse Resp SpO2 Height Weight   12/31/21 1009 131/86 97.8  F (36.6  C) Oral -- 16 97 % -- --   12/31/21 0834 (!) 149/60 98.1  F (36.7  C) Oral 68 20 98 % 1.626 m (5' 4\") 69.9 kg (154 lb)       PHYSICAL EXAM    VITAL SIGNS: /86   Pulse 68   Temp 97.8  F (36.6  C) (Oral)   Resp 16   Ht 1.626 m (5' 4\")   Wt 69.9 kg (154 lb)   SpO2 97%   BMI 26.43 kg/m    General Appearance: Alert, cooperative, normal speech and facial symmetry, appears stated age, the patient does not appear in distress  Head:  Normocephalic, without obvious abnormality, atraumatic  Eyes: Conjunctiva/corneas clear, EOM's intact, no nystagmus, PERRL  ENT:  Lips, mucosa, and tongue normal; teeth and gums normal, no pharyngeal inflammation, no dysphonia or difficulty swallowing, membranes are moist without pallor  Neck:  Supple, symmetrical, trachea midline, no midline tenderness or stepoffs  Cardio:  Regular rate and rhythm, S1 and S2 normal, no murmur, rub    or gallop, 2+ pulses symmetric in all extremities  Pulm:  Clear to auscultation bilaterally, respirations unlabored with no accessory muscle use  Back:  Symmetric, no curvature, ROM normal, no spinal tenderness   Abdomen:  Abdomen is soft, non-distended with no tenderness to palpation, rebound tenderness, or guarding.   Extremities:  Tenderness to palpation on the left posterior and lateral shoulder.  There are no overlying skin changes, no significant swelling or effusion noted.  Normal range of motion noted but patient reports pain with full flexion; pulses equal in all extremities, normal cap refill, no joint swelling  Neuro: Patient is awake, alert, and responsive to voice. No gross motor weaknesses or sensory loss; moves all extremities.     LAB:  All pertinent labs reviewed and interpreted.  Labs Ordered and Resulted from Time of ED Arrival to Time of ED Departure - No data to display    RADIOLOGY:  Reviewed " all pertinent imaging. Please see official radiology report.  XR Shoulder Left 2 Views   Final Result   IMPRESSION: No fracture or dislocation. Mild AC joint arthrosis.       Humerus XR,  G/E 2 views, left   Final Result   IMPRESSION: No fracture or dislocation. Mild AC joint arthrosis.             Jenniffer Meng PA-C  Emergency Medicine  [facility] EMERGENCY ROOM  [address] [facility] [address]  [phone]  Dept: [phone]       Jenniffer Meng PA-C  12/31/21 1011

## 2021-12-31 NOTE — DISCHARGE INSTRUCTIONS
You were seen in the ER today for evaluation of shoulder pain.  Your x-rays do not show any evidence of fracture or dislocation.  This is likely a sprain that will heal in the next 1 to 2 weeks.  Treat your pain using the rice method (rest, ice, compression, elevation).    You may take Tylenol for pain, do not exceed 4000 mg of Tylenol per day. Apply ice to the painful areas for 20 minutes/h.  I will also prescribe you a small amount of Norco for breakthrough pain-This is a narcotic pain medication that might be habit forming so only take when necessary. Do not drink alcohol,drive, or operate machinery while taking this medication.  This medication also contains tylenol so monitor your intake closely.    Follow-up withyour primary care provider if your symptoms are not improving in 1 to 2 weeks.  Return to the ER if you develop any new or worsening symptoms like severe pain, fever, excessive swelling or discoloration, loss of sensation orfunction in your foot, or any other symptoms that concern you.

## 2022-01-26 ENCOUNTER — TELEPHONE (OUTPATIENT)
Dept: FAMILY MEDICINE | Facility: CLINIC | Age: 62
End: 2022-01-26
Payer: COMMERCIAL

## 2022-01-26 DIAGNOSIS — M48.061 FORAMINAL STENOSIS OF LUMBAR REGION: ICD-10-CM

## 2022-01-26 DIAGNOSIS — E55.9 VITAMIN D DEFICIENCY: Primary | ICD-10-CM

## 2022-01-26 DIAGNOSIS — M06.4 INFLAMMATORY POLYARTHROPATHY OF HAND (H): ICD-10-CM

## 2022-01-26 DIAGNOSIS — K21.9 GASTROESOPHAGEAL REFLUX DISEASE, UNSPECIFIED WHETHER ESOPHAGITIS PRESENT: ICD-10-CM

## 2022-01-27 RX ORDER — FAMOTIDINE 40 MG/1
40 TABLET, FILM COATED ORAL EVERY EVENING
Qty: 30 TABLET | Refills: 5 | Status: SHIPPED | OUTPATIENT
Start: 2022-01-27 | End: 2022-05-06

## 2022-01-27 RX ORDER — PANTOPRAZOLE SODIUM 40 MG/1
40 TABLET, DELAYED RELEASE ORAL DAILY
Qty: 30 TABLET | Refills: 5 | Status: SHIPPED | OUTPATIENT
Start: 2022-01-27 | End: 2022-05-06

## 2022-01-27 RX ORDER — GABAPENTIN 100 MG/1
CAPSULE ORAL
Qty: 90 CAPSULE | Refills: 0 | Status: SHIPPED | OUTPATIENT
Start: 2022-01-27 | End: 2022-03-25

## 2022-01-27 RX ORDER — SUCRALFATE 1 G/1
TABLET ORAL
Qty: 120 TABLET | Refills: 3 | Status: SHIPPED | OUTPATIENT
Start: 2022-01-27 | End: 2022-05-06

## 2022-01-27 RX ORDER — CHOLECALCIFEROL (VITAMIN D3) 50 MCG
1 TABLET ORAL DAILY
Qty: 90 TABLET | Refills: 1 | Status: SHIPPED | OUTPATIENT
Start: 2022-01-27 | End: 2022-05-06

## 2022-01-27 NOTE — TELEPHONE ENCOUNTER
Reason for Call:  Medication or medication refill:    Do you use a [facility]?  Name of the pharmacy and phone number for the current request:  [facility] in [place]    Name of the medication requested: aspirin, gabapentin and sucralfate     Other request:     Can we leave a detailed message on this number? Not Applicable    Phone number patient can be reached at: Home number on file [phone] (home)    Best Time: asap    Call taken on 1/27/2022 at 2:07 PM by Monie Barrett

## 2022-01-27 NOTE — TELEPHONE ENCOUNTER
"Routing refill request to provider for review/approval because:  A break in medication  Patient needs to be seen because:  PCP advised to follow up on 1/19/22.   Pepcid end date is 1/27/22 (states is discontinued).   Aspirin end date is 1/27/22, discontinued.   Carafate end date is 1/27/22, discontinued.   Drug not on [facility] protocol.     Last Written Prescription Date:  4/14/21   Last Fill Quantity: 30,  # refills: 5   Last office visit provider:  7/19/21, routine physical 5/3/21      Last Written Prescription Date:  7/19/21  Last Fill Quantity: 30,  # refills: 5     Last Written Prescription Date:  7/19/21  Last Fill Quantity: 30,  # refills: 5     Last Written Prescription Date:  7/19/21  Last Fill Quantity: 90,  # refills: 1     Last Written Prescription Date:  12/23/21  Last Fill Quantity: 90,  # refills: 0     Last Written Prescription Date:  7/19/21  Last Fill Quantity: 120,  # refills: 3     Requested Prescriptions   Pending Prescriptions Disp Refills     VITAMIN D3 50 MCG (2000 UT) tablet       Sig: Take 1 tablet (50 mcg) by mouth daily       Vitamin Supplements (Adult) Protocol Passed - 1/27/2022  2:11 PM        Passed - High dose Vitamin D not ordered        Passed - Recent (12 mo) or future (30 days) visit within the authorizing provider's specialty     Patient has had an office visit with the authorizing provider or a provider within the authorizing providers department within the previous 12 mos or has a future within next 30 days. See \"Patient Info\" tab in inbasket, or \"Choose Columns\" in Meds & Orders section of the refill encounter.              Passed - Medication is active on med list           pantoprazole (PROTONIX) 40 MG EC tablet 30 tablet 5     Sig: Take 1 tablet (40 mg) by mouth daily       PPI Protocol Passed - 1/27/2022  2:11 PM        Passed - Not on Clopidogrel (unless Pantoprazole ordered)        Passed - No diagnosis of osteoporosis on record        Passed - Recent (12 mo) or future (30 " "days) visit within the authorizing provider's specialty     Patient has had an office visit with the authorizing provider or a provider within the authorizing providers department within the previous 12 mos or has a future within next 30 days. See \"Patient Info\" tab in inbasket, or \"Choose Columns\" in Meds & Orders section of the refill encounter.              Passed - Medication is active on med list        Passed - Patient is age 18 or older           famotidine (PEPCID) 40 MG tablet 30 tablet 5     Sig: Take 1 tablet (40 mg) by mouth every evening       H2 Blockers Protocol Passed - 1/27/2022  2:11 PM        Passed - Patient is age 12 or older        Passed - Recent (12 mo) or future (30 days) visit within the authorizing provider's specialty     Patient has had an office visit with the authorizing provider or a provider within the authorizing providers department within the previous 12 mos or has a future within next 30 days. See \"Patient Info\" tab in inbasket, or \"Choose Columns\" in Meds & Orders section of the refill encounter.              Passed - Medication is active on med list           aspirin (ASA) 81 MG EC tablet 90 tablet 1     Sig: Take 1 tablet (81 mg) by mouth daily       Analgesics (Non-Narcotic Tylenol and ASA Only) Passed - 1/27/2022  2:11 PM        Passed - Recent (12 mo) or future (30 days) visit within the authorizing provider's specialty     Patient has had an office visit with the authorizing provider or a provider within the authorizing providers department within the previous 12 mos or has a future within next 30 days. See \"Patient Info\" tab in inbasket, or \"Choose Columns\" in Meds & Orders section of the refill encounter.              Passed - Patient is age 20 years or older     If ASA is flagged for ages under 20 years old. Forward to provider for confirmation Ryes Syndrome is not a concern.              Passed - Medication is active on med list           gabapentin (NEURONTIN) 100 MG " "capsule 90 capsule 0     Sig: TAKE 1 CAPSULE BY MOUTH THREE TIMES DAILY       There is no refill protocol information for this order        sucralfate (CARAFATE) 1 GM tablet 120 tablet 3     Sig: [SUCRALFATE (CARAFATE) 1 GRAM TABLET] 1 po four times a day before meals and at bedtime as needed for reflux       Miscellaneous Gastrointestinal Agents Passed - 1/27/2022  2:11 PM        Passed - Recent (12 mo) or future (30 days) visit within the authorizing provider's specialty     Patient has had an office visit with the authorizing provider or a provider within the authorizing providers department within the previous 12 mos or has a future within next 30 days. See \"Patient Info\" tab in inbasket, or \"Choose Columns\" in Meds & Orders section of the refill encounter.              Passed - Medication is active on med list        Passed - Patient is 18 years of age or older               "

## 2022-02-21 ENCOUNTER — APPOINTMENT (OUTPATIENT)
Dept: CT IMAGING | Facility: CLINIC | Age: 62
End: 2022-02-21
Attending: FAMILY MEDICINE
Payer: COMMERCIAL

## 2022-02-21 ENCOUNTER — HOSPITAL ENCOUNTER (EMERGENCY)
Facility: CLINIC | Age: 62
Discharge: HOME | End: 2022-02-21
Attending: FAMILY MEDICINE | Admitting: FAMILY MEDICINE
Payer: COMMERCIAL

## 2022-02-21 VITALS
RESPIRATION RATE: 16 BRPM | SYSTOLIC BLOOD PRESSURE: 126 MMHG | WEIGHT: 150 LBS | TEMPERATURE: 97.9 F | OXYGEN SATURATION: 97 % | BODY MASS INDEX: 25.75 KG/M2 | HEART RATE: 68 BPM | DIASTOLIC BLOOD PRESSURE: 72 MMHG

## 2022-02-21 DIAGNOSIS — R33.9 URINARY RETENTION WITH INCOMPLETE BLADDER EMPTYING: ICD-10-CM

## 2022-02-21 DIAGNOSIS — K29.50 CHRONIC GASTRITIS WITHOUT BLEEDING, UNSPECIFIED GASTRITIS TYPE: ICD-10-CM

## 2022-02-21 LAB
ALBUMIN SERPL-MCNC: 4.9 G/DL (ref 3.5–5)
ALBUMIN UR-MCNC: NEGATIVE MG/DL
ALP SERPL-CCNC: 68 U/L (ref 45–120)
ALT SERPL W P-5'-P-CCNC: 23 U/L (ref 0–45)
ANION GAP SERPL CALCULATED.3IONS-SCNC: 9 MMOL/L (ref 5–18)
APPEARANCE UR: CLEAR
AST SERPL W P-5'-P-CCNC: 19 U/L (ref 0–40)
BASOPHILS # BLD MANUAL: 0.1 10E3/UL (ref 0–0.2)
BASOPHILS NFR BLD MANUAL: 2 %
BILIRUB DIRECT SERPL-MCNC: <0.1 MG/DL
BILIRUB SERPL-MCNC: 0.5 MG/DL (ref 0–1)
BILIRUB UR QL STRIP: NEGATIVE
BUN SERPL-MCNC: 9 MG/DL (ref 8–22)
CALCIUM SERPL-MCNC: 9.9 MG/DL (ref 8.5–10.5)
CHLORIDE BLD-SCNC: 104 MMOL/L (ref 98–107)
CO2 SERPL-SCNC: 27 MMOL/L (ref 22–31)
COLOR UR AUTO: COLORLESS
CREAT SERPL-MCNC: 1.19 MG/DL (ref 0.7–1.3)
EOSINOPHIL # BLD MANUAL: 0.1 10E3/UL (ref 0–0.7)
EOSINOPHIL NFR BLD MANUAL: 1 %
ERYTHROCYTE [DISTWIDTH] IN BLOOD BY AUTOMATED COUNT: 13.2 % (ref 10–15)
GFR SERPL CREATININE-BSD FRML MDRD: 70 ML/MIN/1.73M2
GLUCOSE BLD-MCNC: 101 MG/DL (ref 70–125)
GLUCOSE UR STRIP-MCNC: NEGATIVE MG/DL
HCT VFR BLD AUTO: 45.7 % (ref 40–53)
HGB BLD-MCNC: 14.9 G/DL (ref 13.3–17.7)
HGB UR QL STRIP: NEGATIVE
KETONES UR STRIP-MCNC: NEGATIVE MG/DL
LEUKOCYTE ESTERASE UR QL STRIP: NEGATIVE
LIPASE SERPL-CCNC: 64 U/L (ref 0–52)
LYMPHOCYTES # BLD MANUAL: 0.7 10E3/UL (ref 0.8–5.3)
LYMPHOCYTES NFR BLD MANUAL: 13 %
MAGNESIUM SERPL-MCNC: 2 MG/DL (ref 1.8–2.6)
MCH RBC QN AUTO: 28.4 PG (ref 26.5–33)
MCHC RBC AUTO-ENTMCNC: 32.6 G/DL (ref 31.5–36.5)
MCV RBC AUTO: 87 FL (ref 78–100)
MONOCYTES # BLD MANUAL: 0.3 10E3/UL (ref 0–1.3)
MONOCYTES NFR BLD MANUAL: 5 %
NEUTROPHILS # BLD MANUAL: 4.4 10E3/UL (ref 1.6–8.3)
NEUTROPHILS NFR BLD MANUAL: 79 %
NITRATE UR QL: NEGATIVE
PH UR STRIP: 6.5 [PH] (ref 5–7)
PLAT MORPH BLD: ABNORMAL
PLATELET # BLD AUTO: 148 10E3/UL (ref 150–450)
POTASSIUM BLD-SCNC: 4.1 MMOL/L (ref 3.5–5)
PROT SERPL-MCNC: 8.7 G/DL (ref 6–8)
RBC # BLD AUTO: 5.25 10E6/UL (ref 4.4–5.9)
RBC MORPH BLD: ABNORMAL
RBC URINE: <1 /HPF
SODIUM SERPL-SCNC: 140 MMOL/L (ref 136–145)
SP GR UR STRIP: 1 (ref 1–1.03)
UROBILINOGEN UR STRIP-MCNC: <2 MG/DL
WBC # BLD AUTO: 5.6 10E3/UL (ref 4–11)
WBC URINE: 1 /HPF

## 2022-02-21 PROCEDURE — 99285 EMERGENCY DEPT VISIT HI MDM: CPT | Mod: 25

## 2022-02-21 PROCEDURE — 82248 BILIRUBIN DIRECT: CPT | Performed by: FAMILY MEDICINE

## 2022-02-21 PROCEDURE — 250N000013 HC RX MED GY IP 250 OP 250 PS 637: Performed by: FAMILY MEDICINE

## 2022-02-21 PROCEDURE — 74177 CT ABD & PELVIS W/CONTRAST: CPT

## 2022-02-21 PROCEDURE — 36415 COLL VENOUS BLD VENIPUNCTURE: CPT | Performed by: FAMILY MEDICINE

## 2022-02-21 PROCEDURE — 85027 COMPLETE CBC AUTOMATED: CPT | Performed by: FAMILY MEDICINE

## 2022-02-21 PROCEDURE — 83690 ASSAY OF LIPASE: CPT | Performed by: FAMILY MEDICINE

## 2022-02-21 PROCEDURE — 250N000011 HC RX IP 250 OP 636: Performed by: FAMILY MEDICINE

## 2022-02-21 PROCEDURE — 81003 URINALYSIS AUTO W/O SCOPE: CPT | Performed by: FAMILY MEDICINE

## 2022-02-21 PROCEDURE — 82310 ASSAY OF CALCIUM: CPT | Performed by: FAMILY MEDICINE

## 2022-02-21 PROCEDURE — 83735 ASSAY OF MAGNESIUM: CPT | Performed by: FAMILY MEDICINE

## 2022-02-21 RX ORDER — TAMSULOSIN HYDROCHLORIDE 0.4 MG/1
0.4 CAPSULE ORAL ONCE
Status: COMPLETED | OUTPATIENT
Start: 2022-02-21 | End: 2022-02-21

## 2022-02-21 RX ORDER — TAMSULOSIN HYDROCHLORIDE 0.4 MG/1
0.4 CAPSULE ORAL DAILY
Qty: 30 CAPSULE | Refills: 0 | Status: SHIPPED | OUTPATIENT
Start: 2022-02-21 | End: 2022-03-23 | Stop reason: ALTCHOICE

## 2022-02-21 RX ORDER — IOPAMIDOL 755 MG/ML
100 INJECTION, SOLUTION INTRAVASCULAR ONCE
Status: COMPLETED | OUTPATIENT
Start: 2022-02-21 | End: 2022-02-21

## 2022-02-21 RX ADMIN — IOPAMIDOL 100 ML: 755 INJECTION, SOLUTION INTRAVENOUS at 11:57

## 2022-02-21 RX ADMIN — TAMSULOSIN HYDROCHLORIDE 0.4 MG: 0.4 CAPSULE ORAL at 13:20

## 2022-02-21 ASSESSMENT — ENCOUNTER SYMPTOMS
BLOOD IN STOOL: 0
HEMATURIA: 0
FREQUENCY: 0
CONSTIPATION: 0
ABDOMINAL PAIN: 1
ABDOMINAL DISTENTION: 1
NAUSEA: 1
DYSURIA: 1
DIARRHEA: 0
VOMITING: 0

## 2022-02-21 NOTE — ED PROVIDER NOTES
EMERGENCY DEPARTMENT ENCOUNTER      NAME: Nesha Newton  AGE: 61 year old male  YOB: 1960  MRN: [id number]  EVALUATION DATE & TIME: 2/21/2022 10:12 AM    PCP: Max Mart    ED PROVIDER: Miguel Ángel Gamble M.D.    Chief Complaint   Patient presents with     Abdominal Pain       FINAL IMPRESSION:  1. Chronic gastritis without bleeding, unspecified gastritis type    2. Urinary retention with incomplete bladder emptying        ED COURSE & MEDICAL DECISION MAKING:    Pertinent Labs & Imaging studies personally reviewed and interpreted by me. (See chart for details)  10:15 AM Patient seen and examined, prior records reviewed.  Differential diagnosis includes but not limited to gastritis, cholecystitis, pancreatitis, colitis, enteritis, diverticulitis, urinary tract infection, myocardial infarction, pneumonia appendicitis, AAA, cholelithiasis, ischemic bowel.  Patient presents with bilateral abdominal pain and bloating sensation, last bowel movement was today.  Denies any urinary difficulty, nausea but no vomiting.  On exam, hyperactive bowel sounds, no abdominal tenderness, mild distention without a fluid wave.  Labs and CT scan are ordered.  12:20 PM labs are reassuring, CT scan personally reviewed and interpreted by me demonstrates some nondistended fluid filled small bowel loops in the pelvis along with mild bladder distention.  Postvoid residual is ordered, concern for possible bladder outlet obstruction although no hydronephrosis or hydroureter noted.  1:00 PM radiology interpretation agrees with my initial with mild bladder distention, also findings consistent with gastritis. Patient is already on H2 blocker, PPI, and Carafate. He has had a colonoscopy in the past, unclear if he has had an EGD but given ongoing symptoms of gastritis in spite of maximal therapy, consider EGD with biopsies for H. pylori and if he has not had this done. For his urinary retention today, he will be started on  Flomax and follow-up with primary care.       At the conclusion of the encounter I discussed the results of all of the tests and the disposition. The questions were answered. The patient or family acknowledged understanding and was agreeable with the care plan.     PPE worn: surgical mask.     PROCEDURES:   Procedures    MEDICATIONS GIVEN IN THE EMERGENCY:  Medications   iopamidol (ISOVUE-370) solution 100 mL (100 mLs Intravenous Given 2/21/22 1157)       NEW PRESCRIPTIONS STARTED AT TODAY'S ER VISIT  New Prescriptions    TAMSULOSIN (FLOMAX) 0.4 MG CAPSULE    Take 1 capsule (0.4 mg) by mouth daily for 30 doses       =================================================================    HPI    Patient information was obtained from: patient  Patient declined professional ; used family to interpret. Language: [facility]      Nesha Newton is a 61 year old male with a pertinent history of chronic gastritis, GERD, and BPH who presents to this ED via walk-in for evaluation of abdominal pain and bloating.     Patient reports abdominal pain and bloating which began last night. His pain is located diffusely across his lower abdomen. States his abdomen is more distended than usual. Endorses nausea. No vomiting. Last bowel movement was this morning. Also endorses intermittent discomfort with urination. Denies diarrhea, urinary frequency, hematuria, chest pain, or leg swelling.  Reports he [place] hemorrh[place] and states he does not recall the details of this. Had a colonoscopy this July that was good, per patient. Denies any other abdominal surgeries. Patient takes 81 mg aspirin, famotidine, omeprazole, Carafate, and Miralax. No other complaints or concerns expressed at this time.        REVIEW OF SYSTEMS   Review of Systems   Cardiovascular: Negative for chest pain and leg swelling.   Gastrointestinal: Positive for abdominal distention, abdominal pain and nausea. Negative for blood in  stool, constipation, diarrhea and vomiting.   Genitourinary: Positive for dysuria. Negative for frequency and hematuria.   All other systems reviewed and are negative.     All other systems reviewed and negative    PAST MEDICAL HISTORY:  Past Medical History:   Diagnosis Date     Chronic gastritis without bleeding, unspecified gastritis type 4/3/2018     Coccidioidomycosis, unspecified     Created by Conversion      GERD (gastroesophageal reflux disease)      Hepatic steatosis 4/24/2017    HCV neg,      Osteoarthrosis involving lower leg     Created by Conversion  Replacement Utility updated for latest IMO load     Polyarthralgia 4/5/2017     Vitamin D deficiency        PAST SURGICAL HISTORY:  No past surgical history on file.    CURRENT MEDICATIONS:    No current facility-administered medications for this encounter.     Current Outpatient Medications   Medication     tamsulosin (FLOMAX) 0.4 MG capsule     acetaminophen (TYLENOL) 500 MG tablet     aspirin (ASA) 81 MG EC tablet     cholecalciferol 50 MCG (2000 UT) CAPS     dicyclomine (BENTYL) 20 MG tablet     docusate sodium (COLACE) 100 MG capsule     famotidine (PEPCID) 40 MG tablet     gabapentin (NEURONTIN) 100 MG capsule     hydrocortisone-pramoxine (ANALPRAM-HC) 2.5-1 % rectal cream     hydrOXYchloroQUINE (PLAQUENIL) 200 mg tablet     ibuprofen (ADVIL/MOTRIN) 600 MG tablet     lidocaine (XYLOCAINE) 5 % ointment     neomycin-polymyxin-hydrocortisone (CORTISPORIN) 3.5-96985-4 otic suspension     pantoprazole (PROTONIX) 40 MG EC tablet     polyethylene glycol (MIRALAX) 17 GM/Dose powder     Pramoxine-HC (HYDROCORTISONE ACE-PRAMOXINE) 2.5-1 % CREA     propranoloL (INDERAL LA) 60 mg 24 hr capsule     sucralfate (CARAFATE) 1 GM tablet     VITAMIN D3 50 MCG (2000 UT) tablet       ALLERGIES:  Allergies   Allergen Reactions     Tramadol Itching     Pt states itchy all over     Primidone Other (See Comments)     Dizziness and vomitting       FAMILY HISTORY:  Family  History   Problem Relation Age of Onset     Diabetes Mother        SOCIAL HISTORY:   Social History     Socioeconomic History     Marital status:      Spouse name: Not on file     Number of children: Not on file     Years of education: Not on file     Highest education level: Not on file   Occupational History     Not on file   Tobacco Use     Smoking status: Never Smoker     Smokeless tobacco: Never Used   Substance and Sexual Activity     Alcohol use: No     Drug use: No     Sexual activity: Not on file   Other Topics Concern     Not on file   Social History Narrative     Not on file     Social Determinants of Health     Financial Resource Strain: Not on file   Food Insecurity: Not on file   Transportation Needs: Not on file   Physical Activity: Not on file   Stress: Not on file   Social Connections: Not on file   Intimate Partner Violence: Not on file   Housing Stability: Not on file       VITALS:  BP (!) 122/96   Pulse 69   Temp 97.9  F (36.6  C) (Temporal)   Resp 16   Wt 68 kg (150 lb)   SpO2 99%   BMI 25.75 kg/m      PHYSICAL EXAM:  Physical Exam  Vitals and nursing note reviewed.   Constitutional:       Appearance: Normal appearance.   HENT:      Head: Normocephalic and atraumatic.      Right Ear: External ear normal.      Left Ear: External ear normal.      Nose: Nose normal.      Mouth/Throat:      Mouth: Mucous membranes are moist.   Eyes:      Extraocular Movements: Extraocular movements intact.      Conjunctiva/sclera: Conjunctivae normal.      Pupils: Pupils are equal, round, and reactive to light.   Cardiovascular:      Rate and Rhythm: Normal rate and regular rhythm.   Pulmonary:      Effort: Pulmonary effort is normal.      Breath sounds: Normal breath sounds. No wheezing or rales.   Abdominal:      Palpations: Abdomen is soft.      Tenderness: There is no abdominal tenderness. There is no guarding.      Comments: Hyperactive bowel sounds, mild abdominal distension.   Musculoskeletal:          General: Normal range of motion.      Cervical back: Normal range of motion and neck supple.      Right lower leg: No edema.      Left lower leg: No edema.   Lymphadenopathy:      Cervical: No cervical adenopathy.   Skin:     General: Skin is warm and dry.   Neurological:      General: No focal deficit present.      Mental Status: He is alert and oriented to person, place, and time. Mental status is at baseline.      Comments: No gross focal neurologic deficits   Psychiatric:         Mood and Affect: Mood normal.         Behavior: Behavior normal.         Thought Content: Thought content normal.          LAB:  All pertinent labs reviewed and interpreted.  Results for orders placed or performed during the hospital encounter of 02/21/22   CT Abdomen Pelvis w Contrast    Impression    IMPRESSION:     1.  Mild asymmetric hyperenhancement of the stomach suggests gastritis.    2.  Mild-moderate colonic stool. No bowel obstruction or inflammation otherwise.    3.  Minimal urinary bladder wall thickening.       Basic metabolic panel   Result Value Ref Range    Sodium 140 136 - 145 mmol/L    Potassium 4.1 3.5 - 5.0 mmol/L    Chloride 104 98 - 107 mmol/L    Carbon Dioxide (CO2) 27 22 - 31 mmol/L    Anion Gap 9 5 - 18 mmol/L    Urea Nitrogen 9 8 - 22 mg/dL    Creatinine 1.19 0.70 - 1.30 mg/dL    Calcium 9.9 8.5 - 10.5 mg/dL    Glucose 101 70 - 125 mg/dL    GFR Estimate 70 >60 mL/min/1.73m2   Result Value Ref Range    Lipase 64 (H) 0 - 52 U/L   Result Value Ref Range    Magnesium 2.0 1.8 - 2.6 mg/dL   UA with Microscopic reflex to Culture    Specimen: Urine, Midstream   Result Value Ref Range    Color Urine Colorless Colorless, Straw, Light Yellow, Yellow    Appearance Urine Clear Clear    Glucose Urine Negative Negative mg/dL    Bilirubin Urine Negative Negative    Ketones Urine Negative Negative mg/dL    Specific Gravity Urine 1.004 1.001 - 1.030    Blood Urine Negative Negative    pH Urine 6.5 5.0 - 7.0    Protein  Albumin Urine Negative Negative mg/dL    Urobilinogen Urine <2.0 <2.0 mg/dL    Nitrite Urine Negative Negative    Leukocyte Esterase Urine Negative Negative    RBC Urine <1 <=2 /HPF    WBC Urine 1 <=5 /HPF   Hepatic function panel   Result Value Ref Range    Bilirubin Total 0.5 0.0 - 1.0 mg/dL    Bilirubin Direct <0.1 <=0.5 mg/dL    Protein Total 8.7 (H) 6.0 - 8.0 g/dL    Albumin 4.9 3.5 - 5.0 g/dL    Alkaline Phosphatase 68 45 - 120 U/L    AST 19 0 - 40 U/L    ALT 23 0 - 45 U/L   CBC with platelets and differential   Result Value Ref Range    WBC Count 5.6 4.0 - 11.0 10e3/uL    RBC Count 5.25 4.40 - 5.90 10e6/uL    Hemoglobin 14.9 13.3 - 17.7 g/dL    Hematocrit 45.7 40.0 - 53.0 %    MCV 87 78 - 100 fL    MCH 28.4 26.5 - 33.0 pg    MCHC 32.6 31.5 - 36.5 g/dL    RDW 13.2 10.0 - 15.0 %    Platelet Count 148 (L) 150 - 450 10e3/uL   Manual Differential   Result Value Ref Range    % Neutrophils 79 %    % Lymphocytes 13 %    % Monocytes 5 %    % Eosinophils 1 %    % Basophils 2 %    Absolute Neutrophils 4.4 1.6 - 8.3 10e3/uL    Absolute Lymphocytes 0.7 (L) 0.8 - 5.3 10e3/uL    Absolute Monocytes 0.3 0.0 - 1.3 10e3/uL    Absolute Eosinophils 0.1 0.0 - 0.7 10e3/uL    Absolute Basophils 0.1 0.0 - 0.2 10e3/uL    RBC Morphology Confirmed RBC Indices     Platelet Assessment  Automated Count Confirmed. Platelet morphology is normal.     Automated Count Confirmed. Platelet morphology is normal.       RADIOLOGY:  Reviewed all pertinent imaging. Please see official radiology report.  CT Abdomen Pelvis w Contrast   Final Result   IMPRESSION:       1.  Mild asymmetric hyperenhancement of the stomach suggests gastritis.      2.  Mild-moderate colonic stool. No bowel obstruction or inflammation otherwise.      3.  Minimal urinary bladder wall thickening.                IJoni, am serving as a scribe to document services personally performed by Dr. Gamble based on my observation and the provider's statements to me. I,  Miguel Ángel Gamble MD attest that Joni Ryan is acting in a scribe capacity, has observed my performance of the services and has documented them in accordance with my direction.    Miguel Ángel Gamble M.D.  Emergency Medicine  [facility] EMERGENCY ROOM  [address] [facility] [address]  [phone]  Dept: [phone]     Miguel Ángel Gamble MD  02/21/22 1301

## 2022-02-21 NOTE — ED TRIAGE NOTES
Patient is here with abdomen pain throughout with bloating. He reported having a hard time urinating. He had a scope in July which was normal.

## 2022-03-23 ENCOUNTER — HOSPITAL ENCOUNTER (EMERGENCY)
Facility: CLINIC | Age: 62
Discharge: HOME | End: 2022-03-23
Attending: STUDENT IN AN ORGANIZED HEALTH CARE EDUCATION/TRAINING PROGRAM | Admitting: STUDENT IN AN ORGANIZED HEALTH CARE EDUCATION/TRAINING PROGRAM
Payer: COMMERCIAL

## 2022-03-23 VITALS
HEART RATE: 71 BPM | WEIGHT: 150 LBS | SYSTOLIC BLOOD PRESSURE: 130 MMHG | BODY MASS INDEX: 25.75 KG/M2 | OXYGEN SATURATION: 98 % | DIASTOLIC BLOOD PRESSURE: 73 MMHG | TEMPERATURE: 97.9 F | RESPIRATION RATE: 16 BRPM

## 2022-03-23 DIAGNOSIS — M54.2 NECK PAIN: ICD-10-CM

## 2022-03-23 PROCEDURE — 96372 THER/PROPH/DIAG INJ SC/IM: CPT | Performed by: STUDENT IN AN ORGANIZED HEALTH CARE EDUCATION/TRAINING PROGRAM

## 2022-03-23 PROCEDURE — 99284 EMERGENCY DEPT VISIT MOD MDM: CPT | Mod: 25

## 2022-03-23 PROCEDURE — 250N000011 HC RX IP 250 OP 636: Performed by: STUDENT IN AN ORGANIZED HEALTH CARE EDUCATION/TRAINING PROGRAM

## 2022-03-23 RX ORDER — KETOROLAC TROMETHAMINE 15 MG/ML
15 INJECTION, SOLUTION INTRAMUSCULAR; INTRAVENOUS ONCE
Status: COMPLETED | OUTPATIENT
Start: 2022-03-23 | End: 2022-03-23

## 2022-03-23 RX ORDER — TAMSULOSIN HYDROCHLORIDE 0.4 MG/1
0.4 CAPSULE ORAL DAILY
Qty: 30 CAPSULE | Refills: 0 | Status: SHIPPED | OUTPATIENT
Start: 2022-03-23 | End: 2022-04-22

## 2022-03-23 RX ADMIN — KETOROLAC TROMETHAMINE 15 MG: 15 INJECTION, SOLUTION INTRAMUSCULAR; INTRAVENOUS at 08:49

## 2022-03-23 ASSESSMENT — ENCOUNTER SYMPTOMS
TROUBLE SWALLOWING: 1
NECK PAIN: 1
FEVER: 0
SPEECH DIFFICULTY: 0
HEADACHES: 1
SORE THROAT: 0

## 2022-03-23 NOTE — ED PROVIDER NOTES
EMERGENCY DEPARTMENT ENCOUNTER      NAME: Nesha Newton  AGE: 61 year old male  YOB: 1960  MRN: [id number]  EVALUATION DATE & TIME: 3/23/2022  8:19 AM    PCP: Max Mart    ED PROVIDER: Avinash Bowers M.D.      Chief Complaint   Patient presents with     Neck Pain         FINAL IMPRESSION:  1. Neck pain          ED COURSE & MEDICAL DECISION MAKING:    Pertinent Labs & Imaging studies reviewed. (See chart for details)  61 year old male presents to the Emergency Department for evaluation of neck pain.  Patient has an isolated localized tenderness to his left neck.  He has a slightly enlarged and tender lymph node in that area which is causing him the pain.  No other swellings or masses noted.  Oropharynx is normal with no signs of infection.  Trachea is midline.  Ears are normal.  No rash.  I believe the patient's pain is caused by this lymph node.  With no obvious infection we will hold off on antibiotics.  Spoke to him and his daughter about following up with his primary doctor or an ENT doctor should the pain persist.  We will try ibuprofen and cold packs.  Will return to the ER for high fevers worsening symptoms or significant difficulty swallowing.  Patient was seen here with urinary retention a month ago and started on Flomax which he felt has helped him and he is now out of the medication and they stated that the primary doctor did not have any appointments until at least a month from now.  Blood pressure is okay here he has no urinary tract infection-like symptoms and I was comfortable refilling the Flomax as long as they schedule follow-up today.    8:27 AM I introduced myself to the patient, performed my physical exam, and discussed plan for ED workup.  8:38 AM Patient to be discharged by ED RN after medications are administered.    At the conclusion of the encounter I discussed the results of all of the tests and the disposition. The questions were answered. The patient or family  acknowledged understanding and was agreeable with the care plan.         MEDICATIONS GIVEN IN THE EMERGENCY:  Medications   ketorolac (TORADOL) injection 15 mg (15 mg Intramuscular Given 3/23/22 0849)       NEW PRESCRIPTIONS STARTED AT TODAY'S ER VISIT  Discharge Medication List as of 3/23/2022  9:13 AM             =================================================================    HPI    Patient information was obtained from: Daughter    Use of : Yes (In Person: Daughter) - Language: Brandon Newton is a 61 year old male with a pertinent history of GERD, hepatic steatosis, and chronic eosinophilic pneumonia who presents to this ED by walk in for evaluation of neck pain.    Per the patient's daughter, the patient has had 3 days of left sided neck pain. He has had difficulty swallowing and eating due to the pain. He describes the pain as different than a sore throat and feels a bump in the left side of his throat. The pain comes and goes. He has had similar pain in the past which was improved with Tylenol, ibuprofen, and warm water, but he has not been able to get any relief when trying these. This morning his daughter noticed the let side of his neck was swollen, but the swelling has now decreased. He last took ibuprofen at 0630 this morning without improvement. He has associated left ear pain and a headache in the left side of his head.    He denies fever, rashes speech changes, and any other complaints at this time.    REVIEW OF SYSTEMS   Review of Systems   Constitutional: Negative for fever.   HENT: Positive for ear pain (left) and trouble swallowing. Negative for sore throat.    Musculoskeletal: Positive for neck pain (left, with bump).   Skin: Negative for rash.   Neurological: Positive for headaches (left). Negative for speech difficulty.   All other systems reviewed and are negative.       PAST MEDICAL HISTORY:  Past Medical History:   Diagnosis Date     Chronic gastritis without  bleeding, unspecified gastritis type 4/3/2018     Coccidioidomycosis, unspecified     Created by Conversion      GERD (gastroesophageal reflux disease)      Hepatic steatosis 4/24/2017    HCV neg,      Osteoarthrosis involving lower leg     Created by Conversion  Replacement Utility updated for latest IMO load     Polyarthralgia 4/5/2017     Vitamin D deficiency        PAST SURGICAL HISTORY:  History reviewed. No pertinent surgical history.        CURRENT MEDICATIONS:    tamsulosin (FLOMAX) 0.4 MG capsule  acetaminophen (TYLENOL) 500 MG tablet  aspirin (ASA) 81 MG EC tablet  cholecalciferol 50 MCG (2000 UT) CAPS  dicyclomine (BENTYL) 20 MG tablet  docusate sodium (COLACE) 100 MG capsule  famotidine (PEPCID) 40 MG tablet  gabapentin (NEURONTIN) 100 MG capsule  hydrocortisone-pramoxine (ANALPRAM-HC) 2.5-1 % rectal cream  hydrOXYchloroQUINE (PLAQUENIL) 200 mg tablet  ibuprofen (ADVIL/MOTRIN) 600 MG tablet  lidocaine (XYLOCAINE) 5 % ointment  neomycin-polymyxin-hydrocortisone (CORTISPORIN) 3.5-71037-0 otic suspension  pantoprazole (PROTONIX) 40 MG EC tablet  polyethylene glycol (MIRALAX) 17 GM/Dose powder  Pramoxine-HC (HYDROCORTISONE ACE-PRAMOXINE) 2.5-1 % CREA  propranoloL (INDERAL LA) 60 mg 24 hr capsule  sucralfate (CARAFATE) 1 GM tablet  VITAMIN D3 50 MCG (2000 UT) tablet        ALLERGIES:  Allergies   Allergen Reactions     Tramadol Itching     Pt states itchy all over     Primidone Other (See Comments)     Dizziness and vomitting       FAMILY HISTORY:  Family History   Problem Relation Age of Onset     Diabetes Mother        SOCIAL HISTORY:   Social History     Socioeconomic History     Marital status:      Spouse name: Not on file     Number of children: Not on file     Years of education: Not on file     Highest education level: Not on file   Occupational History     Not on file   Tobacco Use     Smoking status: Never Smoker     Smokeless tobacco: Never Used   Substance and Sexual Activity     Alcohol  use: No     Drug use: No     Sexual activity: Not on file   Other Topics Concern     Not on file   Social History Narrative     Not on file     Social Determinants of Health     Financial Resource Strain: Not on file   Food Insecurity: Not on file   Transportation Needs: Not on file   Physical Activity: Not on file   Stress: Not on file   Social Connections: Not on file   Intimate Partner Violence: Not on file   Housing Stability: Not on file       VITALS:  /73   Pulse 71   Temp 97.9  F (36.6  C)   Resp 16   Wt 68 kg (150 lb)   SpO2 98%   BMI 25.75 kg/m        PHYSICAL EXAM    Constitutional: Well developed, Well nourished, NAD, GCS 15  HENT: Normocephalic, Atraumatic, Bilateral external ears normal, Oropharynx normal, mucous membranes moist, Nose normal. Neck-  Normal range of motion, No tenderness, Supple, No stridor.  There is a small half centimeter tender lymph node in the anterior cervical chain.  I do not appreciate multiple nodes only this 1.  No other masses.  Trachea is midline.  Eyes: PERRL, EOMI, Conjunctiva normal, No discharge.   Respiratory: Normal breath sounds, No respiratory distress, No wheezing, Speaks full sentences easily. No cough.  Cardiovascular: Normal heart rate, Regular rhythm, No murmurs, No rubs, No gallops. Chest wall nontender.  GI:Soft, No tenderness, No masses, No flank tenderness. No rebound or guarding.   Musculoskeletal: 2+ DP pulses. No edema.No cyanosis, No clubbing. Good range of motion in all major joints. No tenderness to palpation or major deformities noted.   Integument: Warm, Dry, No erythema, No rash. No petechiae.   Neurologic: Alert & oriented x 3,  CN 3-12 intact Normal motor function, Normal sensory function, No focal deficits noted. Normal gait. Normal finger to nose bilaterally  Psychiatric: Affect normal, Judgment normal, Mood normal. Cooperative.          LAB:  All pertinent labs reviewed and interpreted.  Labs Ordered and Resulted from Time of ED  Arrival to Time of ED Departure - No data to display    RADIOLOGY:  Reviewed all pertinent imaging. Please see official radiology report.  No orders to display         I, Fredy Pinto, am serving as a scribe to document services personally performed by Dr. Avinash Bowers based on my observation and the provider's statements to me. I, Avinash Bowers MD attest that Fredy Pinto is acting in a scribe capacity, has observed my performance of the services and has documented them in accordance with my direction.    Avinash Bowers M.D.  Emergency Medicine  [facility] EMERGENCY ROOM  [address] [address]  [phone]  Dept: [phone]     Avinash Bowers MD  03/23/22 1143

## 2022-03-23 NOTE — ED TRIAGE NOTES
Patient is here with left neck that started three days ago. Hx of this in the past which went away. Patient reports difficulty swollowing.

## 2022-04-12 DIAGNOSIS — K59.00 CONSTIPATION, UNSPECIFIED CONSTIPATION TYPE: ICD-10-CM

## 2022-04-14 RX ORDER — DOCUSATE SODIUM 100 MG/1
CAPSULE, LIQUID FILLED ORAL
Qty: 60 CAPSULE | Refills: 0 | Status: SHIPPED | OUTPATIENT
Start: 2022-04-14 | End: 2022-05-06

## 2022-04-14 NOTE — TELEPHONE ENCOUNTER
No future appointments.   Health Maintenance Due   Topic Date Due    ANNUAL REVIEW OF HM ORDERS  Never done    ADVANCE CARE PLANNING  Never done    HIV SCREENING  Never done    ZOSTER IMMUNIZATION (1 of 2) Never done    INFLUENZA VACCINE (1) 09/01/2021    PHQ-2 (once per calendar year)  01/01/2022    PREVENTIVE CARE VISIT  05/03/2022     BP Readings from Last 3 Encounters:   03/23/22 130/73   02/21/22 126/72   12/31/21 131/86

## 2022-04-28 ENCOUNTER — TELEPHONE (OUTPATIENT)
Dept: FAMILY MEDICINE | Facility: CLINIC | Age: 62
End: 2022-04-28
Payer: COMMERCIAL

## 2022-04-28 DIAGNOSIS — N40.0 BENIGN PROSTATIC HYPERPLASIA, UNSPECIFIED WHETHER LOWER URINARY TRACT SYMPTOMS PRESENT: Primary | ICD-10-CM

## 2022-04-28 RX ORDER — TAMSULOSIN HYDROCHLORIDE 0.4 MG/1
0.4 CAPSULE ORAL DAILY
Qty: 30 CAPSULE | Refills: 3 | Status: SHIPPED | OUTPATIENT
Start: 2022-04-28 | End: 2022-05-06

## 2022-04-28 NOTE — TELEPHONE ENCOUNTER
.Reason for Call:  Other     Detailed comments: patients daughter is calling, she said her dad was seen at the ed a month or so ago, they gave him an rx for his bladder  Tamsulosin... she is asking if dr jones can refill the rx for her dad?  He uses [facility] on [address] in [place]    Phone Number Patient can be reached at: Home number on file [phone] (home)    Best Time: asap    Can we leave a detailed message on this number? Not Applicable    Call taken on 4/28/2022 at 10:28 AM by Monie Barrett

## 2022-05-01 ENCOUNTER — APPOINTMENT (OUTPATIENT)
Dept: CT IMAGING | Facility: CLINIC | Age: 62
End: 2022-05-01
Attending: EMERGENCY MEDICINE
Payer: COMMERCIAL

## 2022-05-01 ENCOUNTER — HOSPITAL ENCOUNTER (EMERGENCY)
Facility: CLINIC | Age: 62
Discharge: HOME | End: 2022-05-01
Attending: EMERGENCY MEDICINE | Admitting: EMERGENCY MEDICINE
Payer: COMMERCIAL

## 2022-05-01 VITALS
TEMPERATURE: 97.9 F | SYSTOLIC BLOOD PRESSURE: 110 MMHG | WEIGHT: 150 LBS | OXYGEN SATURATION: 98 % | HEART RATE: 76 BPM | BODY MASS INDEX: 25.75 KG/M2 | DIASTOLIC BLOOD PRESSURE: 80 MMHG | RESPIRATION RATE: 16 BRPM

## 2022-05-01 DIAGNOSIS — K59.00 CONSTIPATION, UNSPECIFIED CONSTIPATION TYPE: ICD-10-CM

## 2022-05-01 LAB
ALBUMIN SERPL-MCNC: 4 G/DL (ref 3.5–5)
ALBUMIN UR-MCNC: NEGATIVE MG/DL
ALP SERPL-CCNC: 58 U/L (ref 45–120)
ALT SERPL W P-5'-P-CCNC: 15 U/L (ref 0–45)
ANION GAP SERPL CALCULATED.3IONS-SCNC: 12 MMOL/L (ref 5–18)
APPEARANCE UR: CLEAR
AST SERPL W P-5'-P-CCNC: 15 U/L (ref 0–40)
BASOPHILS # BLD AUTO: 0 10E3/UL (ref 0–0.2)
BASOPHILS NFR BLD AUTO: 1 %
BILIRUB SERPL-MCNC: 0.5 MG/DL (ref 0–1)
BILIRUB UR QL STRIP: NEGATIVE
BUN SERPL-MCNC: 9 MG/DL (ref 8–22)
CALCIUM SERPL-MCNC: 9.3 MG/DL (ref 8.5–10.5)
CHLORIDE BLD-SCNC: 106 MMOL/L (ref 98–107)
CO2 SERPL-SCNC: 23 MMOL/L (ref 22–31)
COLOR UR AUTO: COLORLESS
CREAT SERPL-MCNC: 1.03 MG/DL (ref 0.7–1.3)
EOSINOPHIL # BLD AUTO: 0.2 10E3/UL (ref 0–0.7)
EOSINOPHIL NFR BLD AUTO: 4 %
ERYTHROCYTE [DISTWIDTH] IN BLOOD BY AUTOMATED COUNT: 13.2 % (ref 10–15)
GFR SERPL CREATININE-BSD FRML MDRD: 84 ML/MIN/1.73M2
GLUCOSE BLD-MCNC: 112 MG/DL (ref 70–125)
GLUCOSE UR STRIP-MCNC: NEGATIVE MG/DL
HCT VFR BLD AUTO: 37.9 % (ref 40–53)
HGB BLD-MCNC: 12.6 G/DL (ref 13.3–17.7)
HGB UR QL STRIP: NEGATIVE
IMM GRANULOCYTES # BLD: 0 10E3/UL
IMM GRANULOCYTES NFR BLD: 0 %
KETONES UR STRIP-MCNC: NEGATIVE MG/DL
LEUKOCYTE ESTERASE UR QL STRIP: NEGATIVE
LIPASE SERPL-CCNC: 58 U/L (ref 0–52)
LYMPHOCYTES # BLD AUTO: 1 10E3/UL (ref 0.8–5.3)
LYMPHOCYTES NFR BLD AUTO: 19 %
MCH RBC QN AUTO: 28.4 PG (ref 26.5–33)
MCHC RBC AUTO-ENTMCNC: 33.2 G/DL (ref 31.5–36.5)
MCV RBC AUTO: 86 FL (ref 78–100)
MONOCYTES # BLD AUTO: 0.5 10E3/UL (ref 0–1.3)
MONOCYTES NFR BLD AUTO: 9 %
MUCOUS THREADS #/AREA URNS LPF: PRESENT /LPF
NEUTROPHILS # BLD AUTO: 3.6 10E3/UL (ref 1.6–8.3)
NEUTROPHILS NFR BLD AUTO: 67 %
NITRATE UR QL: NEGATIVE
NRBC # BLD AUTO: 0 10E3/UL
NRBC BLD AUTO-RTO: 0 /100
PH UR STRIP: 5.5 [PH] (ref 5–7)
PLATELET # BLD AUTO: 166 10E3/UL (ref 150–450)
POTASSIUM BLD-SCNC: 3.9 MMOL/L (ref 3.5–5)
PROT SERPL-MCNC: 6.9 G/DL (ref 6–8)
RBC # BLD AUTO: 4.43 10E6/UL (ref 4.4–5.9)
RBC URINE: <1 /HPF
SODIUM SERPL-SCNC: 141 MMOL/L (ref 136–145)
SP GR UR STRIP: 1.01 (ref 1–1.03)
UROBILINOGEN UR STRIP-MCNC: <2 MG/DL
WBC # BLD AUTO: 5.4 10E3/UL (ref 4–11)
WBC URINE: <1 /HPF

## 2022-05-01 PROCEDURE — 74176 CT ABD & PELVIS W/O CONTRAST: CPT

## 2022-05-01 PROCEDURE — 83690 ASSAY OF LIPASE: CPT | Performed by: EMERGENCY MEDICINE

## 2022-05-01 PROCEDURE — 81001 URINALYSIS AUTO W/SCOPE: CPT | Performed by: EMERGENCY MEDICINE

## 2022-05-01 PROCEDURE — 250N000013 HC RX MED GY IP 250 OP 250 PS 637: Performed by: EMERGENCY MEDICINE

## 2022-05-01 PROCEDURE — 87086 URINE CULTURE/COLONY COUNT: CPT | Performed by: EMERGENCY MEDICINE

## 2022-05-01 PROCEDURE — 99284 EMERGENCY DEPT VISIT MOD MDM: CPT | Mod: 25

## 2022-05-01 PROCEDURE — 85004 AUTOMATED DIFF WBC COUNT: CPT | Performed by: EMERGENCY MEDICINE

## 2022-05-01 PROCEDURE — 36415 COLL VENOUS BLD VENIPUNCTURE: CPT | Performed by: EMERGENCY MEDICINE

## 2022-05-01 PROCEDURE — 80053 COMPREHEN METABOLIC PANEL: CPT | Performed by: EMERGENCY MEDICINE

## 2022-05-01 RX ORDER — SENNA AND DOCUSATE SODIUM 50; 8.6 MG/1; MG/1
1 TABLET, FILM COATED ORAL 2 TIMES DAILY PRN
Qty: 30 TABLET | Refills: 0 | Status: SHIPPED | OUTPATIENT
Start: 2022-05-01 | End: 2022-05-06

## 2022-05-01 RX ORDER — MAGNESIUM CARB/ALUMINUM HYDROX 105-160MG
148 TABLET,CHEWABLE ORAL ONCE
Status: DISCONTINUED | OUTPATIENT
Start: 2022-05-01 | End: 2022-05-01 | Stop reason: HOSPADM

## 2022-05-01 RX ORDER — MAGNESIUM CARB/ALUMINUM HYDROX 105-160MG
148 TABLET,CHEWABLE ORAL ONCE
Status: COMPLETED | OUTPATIENT
Start: 2022-05-01 | End: 2022-05-01

## 2022-05-01 RX ADMIN — MAGNESIUM CITRATE 148 ML: 1.75 LIQUID ORAL at 05:37

## 2022-05-01 ASSESSMENT — ENCOUNTER SYMPTOMS
ABDOMINAL PAIN: 1
DIFFICULTY URINATING: 1
ABDOMINAL DISTENTION: 1

## 2022-05-01 NOTE — ED PROVIDER NOTES
NAME: Nesha Newton  AGE: 61 year old male  YOB: 1960  MRN: [id number]  EVALUATION DATE & TIME: 2022  2:35 AM    PCP: Max Mart    ED PROVIDER: Jem Cooney M.D.      Chief Complaint   Patient presents with     Abdominal Pain       FINAL IMPRESSION:  1. Constipation, unspecified constipation type        MEDICAL DECISION MAKIN:51 AM I met with the patient, obtained history, performed an initial exam, and discussed options and plan for diagnostics and treatment here in the ED.  3:56 AM Rechecked on patient. Still having some discomfort. Updated on plan.  5:10 AM Rechecked patient and updated on results. Discussed plan for discharge - patient and daughter agreeable.    Patient was clinically assessed and consented to treatment. After assessment, medical decision making and workup were discussed with the patient. The patient was agreeable to plan for testing, workup, and treatment.  Pertinent Labs & Imaging studies reviewed. (See chart for details)       Nesha Newton is a 61 year old male who presents with abdominal pain.   Differential diagnosis includes but not limited to urinary retention, urinary tract infection, diverticulitis, colitis, small bowel obstruction, constipation.  Patient with fullness or bloating in his lower abdomen which has had before with urinary retention.  Patient had been placed on Flomax for this and reported this relieved his symptoms.  Patient had been off medication for 2 days prior to restarting it the last 2 days and has had some improvement over 2 days but still feeling full tonight.  Bladder scan revealed only 100 mL of urine in not likely urinary retention causing his symptoms.  Additionally after urinating here following the bladder scan he reports some significant relief in that bloated feeling.  He does not report any difficulty emptying or feelings like he still has urine retained.  Urinalysis was negative for infection.  After  discussion with patient given his past prostate enlargement and obstruction with retention we did proceed with labs to check kidney function as well as will check CT scan has kidney or bladder stone could be a possible etiology.  Labs were unremarkable for CBC, metabolic panel or lipase.  CT did not reveal any acute etiology for findings except for significant stool in the bowel.  Likely the constipation is contributing to his bloated feeling as well as possibly pushing on the bladder causing some of the feelings of bloated or retention.  Patient I discussed treatment for this and he does feel well enough he could go home and continue treatment.  He reports having stool softeners however I would suggest stronger laxative or colonic stimulant for treatment.  Patient was given mag citrate here and then be allowed to go home on senna-s for continued treatment of constipation.    0 minutes of critical care time    MEDICATIONS GIVEN IN THE EMERGENCY:  Medications   magnesium citrate solution 148 mL ( Oral Canceled Entry 5/1/22 0526)   magnesium citrate solution 148 mL (148 mLs Oral Given 5/1/22 0537)       NEW PRESCRIPTIONS STARTED AT TODAY'S ER VISIT:  Discharge Medication List as of 5/1/2022  5:25 AM      START taking these medications    Details   SENNA-docusate sodium (SENNA S) 8.6-50 MG tablet Take 1 tablet by mouth 2 times daily as needed (constipation), Disp-30 tablet, R-0, Local PrintTake twice daily for the next 3 days and then as needed.           =================================================================    HPI    Patient information was obtained from: Daughter    Use of :Yes (In Person - Daughter) - Language Uzbek      Nesha Newton is a 61 year old male with a past medical history of chronic gastritis, GERD, BPH, who presents for evaluation of abdominal pain. Patient presents with daughter who provides history. She reports patient has been complaining of 2 days of sharp lower abdominal  pain and fullness. Pain feels similar to the pain he was having 2 months ago when he was seen in this ED and had some urine retention. She reports patient was given prescription for tamsulosin at that time with relief, but ran out 2 days ago, which is when the pain returned. Daughter called patient's PCP and had prescription refilled. Patient had relief after taking the medication, but is still having some sharp lower abdominal pain. She denies any other concerns.    Per chart review, patient was seen in this ED on 2/21/22 for evaluation of lower abdominal pain and bloating. Was having some discomfort with urination at that time. Labs reassuring. CT demonstrated some nondistended fluid filled small bowel loops in the pelvis along with mild bladder distention. Postvoid residual is ordered, concern for possible bladder outlet obstruction although no hydronephrosis or hydroureter noted. Patient started on Flomax for urinary retention and discharged in stable condition. Given 30 day course of Flomax.    Patient was seen again in this ED on 3/23/22 for an unrelated complaint. Noted at that time that patient ran out of Flomax. Prescription refilled.      REVIEW OF SYSTEMS  Review of Systems   Gastrointestinal: Positive for abdominal distention (fullness) and abdominal pain (lower).   Genitourinary: Positive for difficulty urinating.   All other systems reviewed and are negative.     PAST MEDICAL HISTORY:  Past Medical History:   Diagnosis Date     Chronic gastritis without bleeding, unspecified gastritis type 4/3/2018     Coccidioidomycosis, unspecified     Created by Conversion      GERD (gastroesophageal reflux disease)      Hepatic steatosis 4/24/2017    HCV neg,      Osteoarthrosis involving lower leg     Created by Conversion  Replacement Utility updated for latest IMO load     Polyarthralgia 4/5/2017     Vitamin D deficiency        PAST SURGICAL HISTORY:  History reviewed. No pertinent surgical history.    CURRENT  MEDICATIONS:      Current Facility-Administered Medications:      magnesium citrate solution 148 mL, 148 mL, Oral, Once, Jem Cooney MD    Current Outpatient Medications:      SENNA-docusate sodium (SENNA S) 8.6-50 MG tablet, Take 1 tablet by mouth 2 times daily as needed (constipation), Disp: 30 tablet, Rfl: 0     acetaminophen (TYLENOL) 500 MG tablet, Take 2 tablets (1,000 mg) by mouth every 6 hours as needed, Disp: 100 tablet, Rfl: 1     aspirin (ASA) 81 MG EC tablet, Take 1 tablet (81 mg) by mouth daily, Disp: 90 tablet, Rfl: 1     cholecalciferol 50 MCG (2000 UT) CAPS, Take 2,000 Units by mouth daily, Disp: 30 capsule, Rfl: 5     dicyclomine (BENTYL) 20 MG tablet, TAKE 1 TABLET BY MOUTH 4 TIMES DAILY AS NEEDED FOR ABDOMINAL PAIN CRAMPING, Disp: , Rfl:      docusate sodium (EQ STOOL SOFTENER) 100 MG capsule, TAKE 1 CAPSULE BY MOUTH TWICE DAILY AS NEEDED, Disp: 60 capsule, Rfl: 0     famotidine (PEPCID) 40 MG tablet, Take 1 tablet (40 mg) by mouth every evening, Disp: 30 tablet, Rfl: 5     gabapentin (NEURONTIN) 100 MG capsule, Take 1 capsule (100 mg) by mouth 3 times daily TAKE 1 CAPSULE BY MOUTH THREE TIMES DAILY;  Needs visit prior to future refills., Disp: 90 capsule, Rfl: 0     hydrocortisone-pramoxine (ANALPRAM-HC) 2.5-1 % rectal cream, [HYDROCORTISONE-PRAMOXINE (ANALPRAM-HC) 2.5-1 % RECTAL CREAM] Insert into the rectum 3 (three) times a day. Apply  Externally bid as directed, Disp: 30 g, Rfl: 1     hydrOXYchloroQUINE (PLAQUENIL) 200 mg tablet, [HYDROXYCHLOROQUINE (PLAQUENIL) 200 MG TABLET] Take 1 tablet (200 mg total) by mouth daily., Disp: 90 tablet, Rfl: 1     ibuprofen (ADVIL/MOTRIN) 600 MG tablet, [IBUPROFEN (ADVIL,MOTRIN) 600 MG TABLET] TAKE 1 TABLET BY MOUTH THREE TIMES DAILY AS NEEDED FOR PAIN, Disp: 60 tablet, Rfl: 3     lidocaine (XYLOCAINE) 5 % ointment, [LIDOCAINE (XYLOCAINE) 5 % OINTMENT] Apply two times a day prn, Disp: 35.44 g, Rfl: 1     neomycin-polymyxin-hydrocortisone  (CORTISPORIN) 3.5-33360-1 otic suspension, Place 3 drops into both ears 3 times daily, Disp: 10 mL, Rfl: 1     pantoprazole (PROTONIX) 40 MG EC tablet, Take 1 tablet (40 mg) by mouth daily, Disp: 30 tablet, Rfl: 5     polyethylene glycol (MIRALAX) 17 GM/Dose powder, Take 17 g by mouth 2 times daily, Disp: 765 g, Rfl: 5     Pramoxine-HC (HYDROCORTISONE ACE-PRAMOXINE) 2.5-1 % CREA, , Disp: , Rfl:      propranoloL (INDERAL LA) 60 mg 24 hr capsule, [PROPRANOLOL (INDERAL LA) 60 MG 24 HR CAPSULE] Take 1 capsule (60 mg total) by mouth daily., Disp: 30 capsule, Rfl: 5     sucralfate (CARAFATE) 1 GM tablet, [SUCRALFATE (CARAFATE) 1 GRAM TABLET] 1 po four times a day before meals and at bedtime as needed for reflux, Disp: 120 tablet, Rfl: 3     tamsulosin (FLOMAX) 0.4 MG capsule, Take 1 capsule (0.4 mg) by mouth daily, Disp: 30 capsule, Rfl: 3     VITAMIN D3 50 MCG (2000 UT) tablet, Take 1 tablet (50 mcg) by mouth daily, Disp: 90 tablet, Rfl: 1    ALLERGIES:  Allergies   Allergen Reactions     Tramadol Itching     Pt states itchy all over     Primidone Other (See Comments)     Dizziness and vomitting       FAMILY HISTORY:  Family History   Problem Relation Age of Onset     Diabetes Mother        SOCIAL HISTORY:   Social History     Socioeconomic History     Marital status:    Tobacco Use     Smoking status: Never Smoker     Smokeless tobacco: Never Used   Substance and Sexual Activity     Alcohol use: No     Drug use: No       PHYSICAL EXAM:    Vitals: /80   Pulse 76   Temp 97.9  F (36.6  C) (Oral)   Resp 16   Wt 68 kg (150 lb)   SpO2 98%   BMI 25.75 kg/m     Physical Exam  Vitals and nursing note reviewed.   Constitutional:       General: He is not in acute distress.     Appearance: He is well-developed and normal weight. He is not ill-appearing or toxic-appearing.   HENT:      Head: Normocephalic.   Cardiovascular:      Rate and Rhythm: Normal rate and regular rhythm.      Heart sounds: Normal heart  sounds.   Pulmonary:      Effort: Pulmonary effort is normal. No respiratory distress.      Breath sounds: Normal breath sounds.   Abdominal:      General: Bowel sounds are normal. There is distension (Slight fullness in lower abdomen over the suprapubic region).      Palpations: Abdomen is soft.      Tenderness: There is abdominal tenderness in the suprapubic area. There is no right CVA tenderness, left CVA tenderness, guarding or rebound.      Hernia: No hernia is present.   Genitourinary:     Penis: Normal.       Testes: Normal.   Skin:     General: Skin is warm and dry.      Capillary Refill: Capillary refill takes less than 2 seconds.   Neurological:      General: No focal deficit present.      Mental Status: He is alert.   Psychiatric:         Behavior: Behavior normal.           LAB:  All pertinent labs reviewed and interpreted.  Labs Ordered and Resulted from Time of ED Arrival to Time of ED Departure   ROUTINE UA WITH MICROSCOPIC REFLEX TO CULTURE - Abnormal       Result Value    Color Urine Colorless      Appearance Urine Clear      Glucose Urine Negative      Bilirubin Urine Negative      Ketones Urine Negative      Specific Gravity Urine 1.006      Blood Urine Negative      pH Urine 5.5      Protein Albumin Urine Negative      Urobilinogen Urine <2.0      Nitrite Urine Negative      Leukocyte Esterase Urine Negative      Mucus Urine Present (*)     RBC Urine <1      WBC Urine <1     LIPASE - Abnormal    Lipase 58 (*)    CBC WITH PLATELETS AND DIFFERENTIAL - Abnormal    WBC Count 5.4      RBC Count 4.43      Hemoglobin 12.6 (*)     Hematocrit 37.9 (*)     MCV 86      MCH 28.4      MCHC 33.2      RDW 13.2      Platelet Count 166      % Neutrophils 67      % Lymphocytes 19      % Monocytes 9      % Eosinophils 4      % Basophils 1      % Immature Granulocytes 0      NRBCs per 100 WBC 0      Absolute Neutrophils 3.6      Absolute Lymphocytes 1.0      Absolute Monocytes 0.5      Absolute Eosinophils 0.2       Absolute Basophils 0.0      Absolute Immature Granulocytes 0.0      Absolute NRBCs 0.0     COMPREHENSIVE METABOLIC PANEL - Normal    Sodium 141      Potassium 3.9      Chloride 106      Carbon Dioxide (CO2) 23      Anion Gap 12      Urea Nitrogen 9      Creatinine 1.03      Calcium 9.3      Glucose 112      Alkaline Phosphatase 58      AST 15      ALT 15      Protein Total 6.9      Albumin 4.0      Bilirubin Total 0.5      GFR Estimate 84     URINE CULTURE       RADIOLOGY:  CT Abdomen Pelvis w/o Contrast   Final Result   IMPRESSION:    1.  No definite acute abnormalities or CT findings to explain the patient's symptoms. Specifically, no urinary calculi or hydronephrosis.      2.  Large amount of stool in the colon. Clinical correlation for any signs of constipation. No evidence for obstruction.      3.  Normal appendix.      4.  Prostatic enlargement.             I, Blaze Schroeder, am serving as a scribe to document services personally performed by Dr. Jem Cooney  based on my observation and the provider's statements to me. I, Jem Cooney MD attest that Blaze Schroeder is acting in a scribe capacity, has observed my performance of the services and has documented them in accordance with my direction.      Jem Cooney M.D.  Emergency Medicine  [facility] Emergency Department     Jem Cooney MD  05/01/22 5102

## 2022-05-01 NOTE — ED TRIAGE NOTES
Pt presents with lower abdominal pain/bladder fullness beginning last night. Pt has had similar issues and was prescribed a medication that helped symptoms. Pt thinks it may have been urinary retention related     Triage Assessment     Row Name 05/01/22 0231       Triage Assessment (Adult)    Airway WDL WDL       Respiratory WDL    Respiratory WDL WDL       Skin Circulation/Temperature WDL    Skin Circulation/Temperature WDL WDL       Cardiac WDL    Cardiac WDL WDL       Peripheral/Neurovascular WDL    Peripheral Neurovascular WDL WDL       Cognitive/Neuro/Behavioral WDL    Cognitive/Neuro/Behavioral WDL WDL

## 2022-05-02 LAB — BACTERIA UR CULT: NO GROWTH

## 2022-05-06 ENCOUNTER — OFFICE VISIT (OUTPATIENT)
Dept: FAMILY MEDICINE | Facility: CLINIC | Age: 62
End: 2022-05-06
Payer: COMMERCIAL

## 2022-05-06 VITALS
TEMPERATURE: 97.8 F | WEIGHT: 154 LBS | SYSTOLIC BLOOD PRESSURE: 119 MMHG | HEART RATE: 73 BPM | BODY MASS INDEX: 26.43 KG/M2 | RESPIRATION RATE: 14 BRPM | OXYGEN SATURATION: 98 % | DIASTOLIC BLOOD PRESSURE: 82 MMHG

## 2022-05-06 DIAGNOSIS — K64.8 OTHER HEMORRHOIDS: ICD-10-CM

## 2022-05-06 DIAGNOSIS — K59.00 CONSTIPATION, UNSPECIFIED CONSTIPATION TYPE: ICD-10-CM

## 2022-05-06 DIAGNOSIS — Z76.0 ENCOUNTER FOR MEDICATION REFILL: ICD-10-CM

## 2022-05-06 DIAGNOSIS — M47.816 LUMBAR FACET ARTHROPATHY: ICD-10-CM

## 2022-05-06 DIAGNOSIS — R10.30 LOWER ABDOMINAL PAIN: ICD-10-CM

## 2022-05-06 DIAGNOSIS — H60.393 INFECTIVE OTITIS EXTERNA, BILATERAL: ICD-10-CM

## 2022-05-06 DIAGNOSIS — K21.9 GASTROESOPHAGEAL REFLUX DISEASE, UNSPECIFIED WHETHER ESOPHAGITIS PRESENT: ICD-10-CM

## 2022-05-06 DIAGNOSIS — N40.0 BENIGN PROSTATIC HYPERPLASIA, UNSPECIFIED WHETHER LOWER URINARY TRACT SYMPTOMS PRESENT: ICD-10-CM

## 2022-05-06 DIAGNOSIS — M06.4 INFLAMMATORY POLYARTHROPATHY OF HAND (H): ICD-10-CM

## 2022-05-06 DIAGNOSIS — E55.9 VITAMIN D DEFICIENCY: ICD-10-CM

## 2022-05-06 DIAGNOSIS — R33.9 URINARY RETENTION: Primary | ICD-10-CM

## 2022-05-06 DIAGNOSIS — M25.50 POLYARTHRALGIA: ICD-10-CM

## 2022-05-06 DIAGNOSIS — M48.061 FORAMINAL STENOSIS OF LUMBAR REGION: ICD-10-CM

## 2022-05-06 LAB — PSA SERPL-MCNC: 1.17 UG/L (ref 0–3.5)

## 2022-05-06 PROCEDURE — 99214 OFFICE O/P EST MOD 30 MIN: CPT | Performed by: FAMILY MEDICINE

## 2022-05-06 PROCEDURE — 36415 COLL VENOUS BLD VENIPUNCTURE: CPT | Performed by: FAMILY MEDICINE

## 2022-05-06 PROCEDURE — G0103 PSA SCREENING: HCPCS | Performed by: FAMILY MEDICINE

## 2022-05-06 RX ORDER — PANTOPRAZOLE SODIUM 40 MG/1
40 TABLET, DELAYED RELEASE ORAL DAILY
Qty: 30 TABLET | Refills: 5 | Status: SHIPPED | OUTPATIENT
Start: 2022-05-06 | End: 2022-10-27

## 2022-05-06 RX ORDER — POLYETHYLENE GLYCOL 3350 17 G/17G
17 POWDER, FOR SOLUTION ORAL 2 TIMES DAILY
Qty: 765 G | Refills: 5 | Status: SHIPPED | OUTPATIENT
Start: 2022-05-06 | End: 2023-03-31

## 2022-05-06 RX ORDER — HYDROCORTISONE ACETATE, PRAMOXINE HCL 2.5; 1 G/100G; G/100G
CREAM TOPICAL
Qty: 57 G | Refills: 1 | Status: SHIPPED | OUTPATIENT
Start: 2022-05-06 | End: 2022-10-27

## 2022-05-06 RX ORDER — GABAPENTIN 100 MG/1
100 CAPSULE ORAL 3 TIMES DAILY
Qty: 90 CAPSULE | Refills: 0 | Status: SHIPPED | OUTPATIENT
Start: 2022-05-06 | End: 2022-10-27

## 2022-05-06 RX ORDER — TAMSULOSIN HYDROCHLORIDE 0.4 MG/1
0.4 CAPSULE ORAL DAILY
Qty: 30 CAPSULE | Refills: 3 | Status: SHIPPED | OUTPATIENT
Start: 2022-05-06 | End: 2022-09-21

## 2022-05-06 RX ORDER — HYDROXYCHLOROQUINE SULFATE 200 MG/1
200 TABLET, FILM COATED ORAL DAILY
Qty: 90 TABLET | Refills: 1 | Status: SHIPPED | OUTPATIENT
Start: 2022-05-06 | End: 2022-10-27

## 2022-05-06 RX ORDER — FAMOTIDINE 40 MG/1
40 TABLET, FILM COATED ORAL EVERY EVENING
Qty: 30 TABLET | Refills: 5 | Status: SHIPPED | OUTPATIENT
Start: 2022-05-06 | End: 2022-10-27

## 2022-05-06 RX ORDER — DICYCLOMINE HCL 20 MG
TABLET ORAL
Qty: 120 TABLET | Refills: 1 | Status: SHIPPED | OUTPATIENT
Start: 2022-05-06 | End: 2022-10-27

## 2022-05-06 RX ORDER — LIDOCAINE 50 MG/G
OINTMENT TOPICAL
Qty: 35.44 G | Refills: 1 | Status: SHIPPED | OUTPATIENT
Start: 2022-05-06 | End: 2022-10-27

## 2022-05-06 RX ORDER — DOCUSATE SODIUM 100 MG/1
CAPSULE, LIQUID FILLED ORAL
Qty: 60 CAPSULE | Refills: 0 | Status: SHIPPED | OUTPATIENT
Start: 2022-05-06 | End: 2022-05-25

## 2022-05-06 RX ORDER — NEOMYCIN SULFATE, POLYMYXIN B SULFATE AND HYDROCORTISONE 10; 3.5; 1 MG/ML; MG/ML; [USP'U]/ML
3 SUSPENSION/ DROPS AURICULAR (OTIC) 3 TIMES DAILY
Qty: 10 ML | Refills: 1 | Status: SHIPPED | OUTPATIENT
Start: 2022-05-06 | End: 2022-10-27

## 2022-05-06 RX ORDER — SUCRALFATE 1 G/1
TABLET ORAL
Qty: 120 TABLET | Refills: 3 | Status: SHIPPED | OUTPATIENT
Start: 2022-05-06 | End: 2023-10-12

## 2022-05-06 RX ORDER — SENNA AND DOCUSATE SODIUM 50; 8.6 MG/1; MG/1
1 TABLET, FILM COATED ORAL 2 TIMES DAILY PRN
Qty: 30 TABLET | Refills: 0 | Status: SHIPPED | OUTPATIENT
Start: 2022-05-06 | End: 2022-05-25

## 2022-05-06 RX ORDER — ACETAMINOPHEN 500 MG
1000 TABLET ORAL EVERY 6 HOURS PRN
Qty: 100 TABLET | Refills: 1 | Status: SHIPPED | OUTPATIENT
Start: 2022-05-06 | End: 2022-10-27

## 2022-05-06 RX ORDER — CHOLECALCIFEROL (VITAMIN D3) 50 MCG
1 TABLET ORAL DAILY
Qty: 90 TABLET | Refills: 1 | Status: SHIPPED | OUTPATIENT
Start: 2022-05-06 | End: 2022-10-27

## 2022-05-06 NOTE — PROGRESS NOTES
Assessment & Plan    1. Urinary retention  Recent hospitalization:  I have reviewed available hospital records, consults, notes, discharge summary as well as imaging and lab results.  In addition I have reviewed her medication list and made any adjustments as indicated in orders.    Likely urinary retention led to infection -   - Adult Urology Referral; Future    2. Benign prostatic hyperplasia, unspecified whether lower urinary tract symptoms present  Patient's urinary retention appears to be BPH - but will do PSA for screening - referral to Urology   - tamsulosin (FLOMAX) 0.4 MG capsule; Take 1 capsule (0.4 mg) by mouth daily  Dispense: 30 capsule; Refill: 3  - PSA, screen; Future  - Adult Urology Referral; Future  - PSA, screen    3. Lower abdominal pain  Lower abdominal pain - recent , related to illness  - acetaminophen (TYLENOL) 500 MG tablet; Take 2 tablets (1,000 mg) by mouth every 6 hours as needed for fever or pain  Dispense: 100 tablet; Refill: 1    4. Inflammatory polyarthropathy of hand (H)  Inflammatory arthralgia- on Plaquenil  - aspirin (ASA) 81 MG EC tablet; Take 1 tablet (81 mg) by mouth daily  Dispense: 90 tablet; Refill: 1  - hydroxychloroquine (PLAQUENIL) 200 MG tablet; Take 1 tablet (200 mg) by mouth daily  Dispense: 90 tablet; Refill: 1    5. Polyarthralgia  Polyarthralgia - takes Plaquenil  - hydroxychloroquine (PLAQUENIL) 200 MG tablet; Take 1 tablet (200 mg) by mouth daily  Dispense: 90 tablet; Refill: 1    6. Vitamin D deficiency  Takes Vitamin D supplements for Vitamin D deficiency  - cholecalciferol 50 MCG (2000 UT) CAPS; Take 2,000 Units by mouth daily  Dispense: 30 capsule; Refill: 5  - VITAMIN D3 50 MCG (2000 UT) tablet; Take 1 tablet (50 mcg) by mouth daily  Dispense: 90 tablet; Refill: 1    7. Constipation, unspecified constipation type  Constipation - multiple medications:  - dicyclomine (BENTYL) 20 MG tablet; TAKE 1 TABLET BY MOUTH 4 TIMES DAILY AS NEEDED FOR ABDOMINAL PAIN  CRAMPING  Dispense: 120 tablet; Refill: 1  - docusate sodium (EQ STOOL SOFTENER) 100 MG capsule; TAKE 1 CAPSULE BY MOUTH TWICE DAILY AS NEEDED  Dispense: 60 capsule; Refill: 0  - polyethylene glycol (MIRALAX) 17 GM/Dose powder; Take 17 g by mouth 2 times daily  Dispense: 765 g; Refill: 5  - SENNA-docusate sodium (SENNA S) 8.6-50 MG tablet; Take 1 tablet by mouth 2 times daily as needed (constipation)  Dispense: 30 tablet; Refill: 0    8. Gastroesophageal reflux disease, unspecified whether esophagitis present  GE reflux - uses multiple medications :  refilled  - famotidine (PEPCID) 40 MG tablet; Take 1 tablet (40 mg) by mouth every evening  Dispense: 30 tablet; Refill: 5  - pantoprazole (PROTONIX) 40 MG EC tablet; Take 1 tablet (40 mg) by mouth daily  Dispense: 30 tablet; Refill: 5  - sucralfate (CARAFATE) 1 GM tablet; [SUCRALFATE (CARAFATE) 1 GRAM TABLET] 1 po four times a day before meals and at bedtime as needed for reflux  Dispense: 120 tablet; Refill: 3    9. Foraminal stenosis of lumbar region  Chronic low back pain - Gabapentin/Lidocaine  - gabapentin (NEURONTIN) 100 MG capsule; Take 1 capsule (100 mg) by mouth 3 times daily TAKE 1 CAPSULE BY MOUTH THREE TIMES DAILY;  Needs visit prior to future refills.  Dispense: 90 capsule; Refill: 0    10. Lumbar facet arthropathy  Chronic low back pain - uses Lidocaine ointment   - lidocaine (XYLOCAINE) 5 % external ointment; [LIDOCAINE (XYLOCAINE) 5 % OINTMENT] Apply two times a day prn  Dispense: 35.44 g; Refill: 1    11. Infective otitis externa, bilateral  Bilateral otitis externa - will refill Cortisporin drops  - neomycin-polymyxin-hydrocortisone (CORTISPORIN) 3.5-98636-5 otic suspension; Place 3 drops into both ears 3 times daily  Dispense: 10 mL; Refill: 1    12. Other hemorrhoids  H/o hemorrhoids - will refill cream  - Pramoxine-HC (HYDROCORTISONE ACE-PRAMOXINE) 2.5-1 % CREA; Apply topically BID to hemorrhoids as needed  Dispense: 57 g; Refill: 1    13.  Encounter for medication refill  Due for Gabapentin refill - ordered  - gabapentin (NEURONTIN) 100 MG capsule; Take 1 capsule (100 mg) by mouth 3 times daily TAKE 1 CAPSULE BY MOUTH THREE TIMES DAILY;  Needs visit prior to future refills.  Dispense: 90 capsule; Refill: 0      Return in about 3 months (around 8/6/2022) for Follow up.    Chief Complaint   Patient presents with     Hospital F/U     Had difficulty urinating       HPI  Went to ER )  Had difficulty urinating - had infection   Thought to have BPH  Feeling better now         Patient Active Problem List   Diagnosis     Vitamin D Deficiency     Osteoarthritis Of The Knee     Chronic Eosinophilic Pneumonia     Right upper quadrant abdominal pain     GERD (gastroesophageal reflux disease)     Polyarthralgia     Hepatic steatosis     Inflammatory polyarthropathy of hand (H)     Adenomatous polyp of colon, unspecified part of colon     Primary osteoarthritis of left knee     Primary osteoarthritis of right knee     Chronic gastritis without bleeding, unspecified gastritis type     Right shoulder tendinitis     Right carpal tunnel syndrome     High risk medication use     Tremor     Benign neoplasm of transverse colon     Other hemorrhoids     Polyp of colon     Unspecified abdominal pain     Constipation, unspecified constipation type     Benign prostatic hyperplasia, unspecified whether lower urinary tract symptoms present        Past Medical History:   Diagnosis Date     Chronic gastritis without bleeding, unspecified gastritis type 4/3/2018     Coccidioidomycosis, unspecified     Created by Conversion      GERD (gastroesophageal reflux disease)      Hepatic steatosis 4/24/2017    HCV neg,      Osteoarthrosis involving lower leg     Created by Conversion  Replacement Utility updated for latest IMO load     Polyarthralgia 4/5/2017     Vitamin D deficiency         Current Outpatient Medications   Medication     acetaminophen (TYLENOL) 500 MG tablet     aspirin  (ASA) 81 MG EC tablet     cholecalciferol 50 MCG (2000 UT) CAPS     dicyclomine (BENTYL) 20 MG tablet     docusate sodium (EQ STOOL SOFTENER) 100 MG capsule     famotidine (PEPCID) 40 MG tablet     gabapentin (NEURONTIN) 100 MG capsule     hydrocortisone-pramoxine (ANALPRAM-HC) 2.5-1 % rectal cream     hydroxychloroquine (PLAQUENIL) 200 MG tablet     lidocaine (XYLOCAINE) 5 % external ointment     neomycin-polymyxin-hydrocortisone (CORTISPORIN) 3.5-32605-4 otic suspension     pantoprazole (PROTONIX) 40 MG EC tablet     polyethylene glycol (MIRALAX) 17 GM/Dose powder     Pramoxine-HC (HYDROCORTISONE ACE-PRAMOXINE) 2.5-1 % CREA     propranoloL (INDERAL LA) 60 mg 24 hr capsule     SENNA-docusate sodium (SENNA S) 8.6-50 MG tablet     sucralfate (CARAFATE) 1 GM tablet     tamsulosin (FLOMAX) 0.4 MG capsule     VITAMIN D3 50 MCG (2000 UT) tablet     ibuprofen (ADVIL/MOTRIN) 600 MG tablet     No current facility-administered medications for this visit.        History reviewed. No pertinent surgical history.     Social History     Socioeconomic History     Marital status:      Spouse name: Not on file     Number of children: Not on file     Years of education: Not on file     Highest education level: Not on file   Occupational History     Not on file   Tobacco Use     Smoking status: Never Smoker     Smokeless tobacco: Never Used   Substance and Sexual Activity     Alcohol use: No     Drug use: No     Sexual activity: Not on file   Other Topics Concern     Not on file   Social History Narrative     Not on file     Social Determinants of Health     Financial Resource Strain: Not on file   Food Insecurity: Not on file   Transportation Needs: Not on file   Physical Activity: Not on file   Stress: Not on file   Social Connections: Not on file   Intimate Partner Violence: Not on file   Housing Stability: Not on file        Family History   Problem Relation Age of Onset     Diabetes Mother         Review of Systems    Constitutional: Negative for chills and fever.   Genitourinary: Positive for difficulty urinating and dysuria.   All other systems reviewed and are negative.       /82 (BP Location: Right arm, Patient Position: Sitting, Cuff Size: Adult Regular)   Pulse 73   Temp 97.8  F (36.6  C) (Oral)   Resp 14   Wt 69.9 kg (154 lb)   SpO2 98%   BMI 26.43 kg/m       Physical Exam  Constitutional:       General: He is not in acute distress.     Appearance: He is well-developed.   HENT:      Right Ear: Tympanic membrane and external ear normal.      Left Ear: Tympanic membrane and external ear normal.      Nose: Nose normal.      Mouth/Throat:      Mouth: No oral lesions.      Pharynx: No oropharyngeal exudate.   Eyes:      General:         Right eye: No discharge.         Left eye: No discharge.      Conjunctiva/sclera: Conjunctivae normal.      Pupils: Pupils are equal, round, and reactive to light.   Neck:      Thyroid: No thyromegaly.      Trachea: No tracheal deviation.   Cardiovascular:      Rate and Rhythm: Normal rate and regular rhythm.      Pulses: Normal pulses.      Heart sounds: Normal heart sounds, S1 normal and S2 normal. No murmur heard.    No S3 or S4 sounds.   Pulmonary:      Effort: Pulmonary effort is normal. No respiratory distress.      Breath sounds: Normal breath sounds. No wheezing or rales.   Abdominal:      General: Bowel sounds are normal.      Palpations: Abdomen is soft. There is no mass.      Tenderness: There is no abdominal tenderness.   Musculoskeletal:         General: No deformity. Normal range of motion.      Cervical back: Neck supple.   Lymphadenopathy:      Cervical: No cervical adenopathy.   Skin:     General: Skin is warm and dry.      Findings: No lesion or rash.   Neurological:      General: No focal deficit present.      Mental Status: He is alert and oriented to person, place, and time.      Motor: No abnormal muscle tone.      Deep Tendon Reflexes: Reflexes are normal and  symmetric.   Psychiatric:         Speech: Speech normal.         Thought Content: Thought content normal.         Judgment: Judgment normal.          Results:  Results for orders placed or performed in visit on 05/06/22   PSA, screen     Status: Normal   Result Value Ref Range    Prostate Specific Antigen Screen 1.17 0.00 - 3.50 ug/L    Narrative    Assay Method is Abbott Prostate-Specific Antigen (PSA)  Standard-WHO 1st International (90:10)       Medications at Conclusion of Visit:  Current Outpatient Medications   Medication Sig Dispense Refill     acetaminophen (TYLENOL) 500 MG tablet Take 2 tablets (1,000 mg) by mouth every 6 hours as needed for fever or pain 100 tablet 1     aspirin (ASA) 81 MG EC tablet Take 1 tablet (81 mg) by mouth daily 90 tablet 1     cholecalciferol 50 MCG (2000 UT) CAPS Take 2,000 Units by mouth daily 30 capsule 5     dicyclomine (BENTYL) 20 MG tablet TAKE 1 TABLET BY MOUTH 4 TIMES DAILY AS NEEDED FOR ABDOMINAL PAIN CRAMPING 120 tablet 1     docusate sodium (EQ STOOL SOFTENER) 100 MG capsule TAKE 1 CAPSULE BY MOUTH TWICE DAILY AS NEEDED 60 capsule 0     famotidine (PEPCID) 40 MG tablet Take 1 tablet (40 mg) by mouth every evening 30 tablet 5     gabapentin (NEURONTIN) 100 MG capsule Take 1 capsule (100 mg) by mouth 3 times daily TAKE 1 CAPSULE BY MOUTH THREE TIMES DAILY;  Needs visit prior to future refills. 90 capsule 0     hydrocortisone-pramoxine (ANALPRAM-HC) 2.5-1 % rectal cream [HYDROCORTISONE-PRAMOXINE (ANALPRAM-HC) 2.5-1 % RECTAL CREAM] Insert into the rectum 3 (three) times a day. Apply  Externally bid as directed 30 g 1     hydroxychloroquine (PLAQUENIL) 200 MG tablet Take 1 tablet (200 mg) by mouth daily 90 tablet 1     lidocaine (XYLOCAINE) 5 % external ointment [LIDOCAINE (XYLOCAINE) 5 % OINTMENT] Apply two times a day prn 35.44 g 1     neomycin-polymyxin-hydrocortisone (CORTISPORIN) 3.5-88122-2 otic suspension Place 3 drops into both ears 3 times daily 10 mL 1      pantoprazole (PROTONIX) 40 MG EC tablet Take 1 tablet (40 mg) by mouth daily 30 tablet 5     polyethylene glycol (MIRALAX) 17 GM/Dose powder Take 17 g by mouth 2 times daily 765 g 5     Pramoxine-HC (HYDROCORTISONE ACE-PRAMOXINE) 2.5-1 % CREA Apply topically BID to hemorrhoids as needed 57 g 1     propranoloL (INDERAL LA) 60 mg 24 hr capsule [PROPRANOLOL (INDERAL LA) 60 MG 24 HR CAPSULE] Take 1 capsule (60 mg total) by mouth daily. 30 capsule 5     SENNA-docusate sodium (SENNA S) 8.6-50 MG tablet Take 1 tablet by mouth 2 times daily as needed (constipation) 30 tablet 0     sucralfate (CARAFATE) 1 GM tablet [SUCRALFATE (CARAFATE) 1 GRAM TABLET] 1 po four times a day before meals and at bedtime as needed for reflux 120 tablet 3     tamsulosin (FLOMAX) 0.4 MG capsule Take 1 capsule (0.4 mg) by mouth daily 30 capsule 3     VITAMIN D3 50 MCG (2000 UT) tablet Take 1 tablet (50 mcg) by mouth daily 90 tablet 1     ibuprofen (ADVIL/MOTRIN) 600 MG tablet [IBUPROFEN (ADVIL,MOTRIN) 600 MG TABLET] TAKE 1 TABLET BY MOUTH THREE TIMES DAILY AS NEEDED FOR PAIN (Patient not taking: Reported on 5/6/2022) 60 tablet 3         JODI AGUDELO MD

## 2022-05-08 ASSESSMENT — ENCOUNTER SYMPTOMS
DYSURIA: 1
FEVER: 0
CHILLS: 0
DIFFICULTY URINATING: 1

## 2022-05-24 DIAGNOSIS — K59.00 CONSTIPATION, UNSPECIFIED CONSTIPATION TYPE: ICD-10-CM

## 2022-05-25 ENCOUNTER — TELEPHONE (OUTPATIENT)
Dept: FAMILY MEDICINE | Facility: CLINIC | Age: 62
End: 2022-05-25
Payer: COMMERCIAL

## 2022-05-25 RX ORDER — DOCUSATE SODIUM 100 MG/1
CAPSULE, LIQUID FILLED ORAL
Qty: 60 CAPSULE | Refills: 11 | Status: SHIPPED | OUTPATIENT
Start: 2022-05-25 | End: 2023-06-15

## 2022-05-25 RX ORDER — MINERAL OIL/PETROLATUM,WHITE 41.5-56.8%
OINTMENT (GRAM) OPHTHALMIC (EYE)
Qty: 30 TABLET | Refills: 11 | Status: SHIPPED | OUTPATIENT
Start: 2022-05-25 | End: 2023-01-07

## 2022-05-25 NOTE — TELEPHONE ENCOUNTER
"Last Written Prescription Date:  5/6/22  Last Fill Quantity: 30,  # refills: 0   Last office visit provider:  5/6/22     Requested Prescriptions   Pending Prescriptions Disp Refills     SENNA-S 8.6-50 MG tablet [Pharmacy Med Name: Senna-S 8.6-50 MG Oral Tablet] 30 tablet 0     Sig: TAKE 1 TABLET BY MOUTH TWICE DAILY AS NEEDED FOR  CONSTIPATION       Laxatives Protocol Passed - 5/24/2022  9:55 AM        Passed - Patient is age 6 or older        Passed - Recent (12 mo) or future (30 days) visit within the authorizing provider's specialty     Patient has had an office visit with the authorizing provider or a provider within the authorizing providers department within the previous 12 mos or has a future within next 30 days. See \"Patient Info\" tab in inbasket, or \"Choose Columns\" in Meds & Orders section of the refill encounter.              Passed - Medication is active on med list           docusate sodium (EQ STOOL SOFTENER) 100 MG capsule [Pharmacy Med Name: EQ Stool Softener 100 MG Oral Capsule] 60 capsule 0     Sig: TAKE 1 CAPSULE BY MOUTH TWICE DAILY AS NEEDED       Laxatives Protocol Passed - 5/24/2022  9:55 AM        Passed - Patient is age 6 or older        Passed - Recent (12 mo) or future (30 days) visit within the authorizing provider's specialty     Patient has had an office visit with the authorizing provider or a provider within the authorizing providers department within the previous 12 mos or has a future within next 30 days. See \"Patient Info\" tab in inbasket, or \"Choose Columns\" in Meds & Orders section of the refill encounter.              Passed - Medication is active on med list             Angelica Montes RN 05/25/22 6:15 PM  "

## 2022-06-07 ENCOUNTER — TRANSFERRED RECORDS (OUTPATIENT)
Dept: HEALTH INFORMATION MANAGEMENT | Facility: CLINIC | Age: 62
End: 2022-06-07
Payer: COMMERCIAL

## 2022-07-18 ENCOUNTER — TELEPHONE (OUTPATIENT)
Dept: NURSING | Facility: CLINIC | Age: 62
End: 2022-07-18

## 2022-07-18 DIAGNOSIS — M06.4 INFLAMMATORY POLYARTHROPATHY OF HAND (H): ICD-10-CM

## 2022-07-18 NOTE — TELEPHONE ENCOUNTER
Robbie Stackla was the caller. Nesha gave me his permission to speak with her about him.    Leaving country 8/1/22.    Will be gone 18 days.  Asking to have asa refilled.    Rx for this was sent in:  aspirin (ASA) 81 MG EC tablet 90 tablet 1 5/6/2022  No   Sig - Route: Take 1 tablet (81 mg) by mouth daily - Oral   Sent to pharmacy as: Aspirin 81 MG Oral Tablet Delayed Release    Was sent to [facility] in [place] on [address].    I gave Malgorzata the above information.  She stated understanding.    Caitlyn TRUJILLO RN [facility]

## 2022-07-23 ENCOUNTER — HEALTH MAINTENANCE LETTER (OUTPATIENT)
Age: 62
End: 2022-07-23

## 2022-07-25 ENCOUNTER — OFFICE VISIT (OUTPATIENT)
Dept: INTERNAL MEDICINE | Facility: CLINIC | Age: 62
End: 2022-07-25
Payer: COMMERCIAL

## 2022-07-25 VITALS
BODY MASS INDEX: 23.22 KG/M2 | HEIGHT: 64 IN | WEIGHT: 136 LBS | HEART RATE: 76 BPM | OXYGEN SATURATION: 98 % | SYSTOLIC BLOOD PRESSURE: 108 MMHG | DIASTOLIC BLOOD PRESSURE: 78 MMHG

## 2022-07-25 DIAGNOSIS — Z71.84 TRAVEL ADVICE ENCOUNTER: Primary | ICD-10-CM

## 2022-07-25 PROCEDURE — 90471 IMMUNIZATION ADMIN: CPT | Performed by: INTERNAL MEDICINE

## 2022-07-25 PROCEDURE — 99213 OFFICE O/P EST LOW 20 MIN: CPT | Mod: 25 | Performed by: INTERNAL MEDICINE

## 2022-07-25 PROCEDURE — 90691 TYPHOID VACCINE IM: CPT | Performed by: INTERNAL MEDICINE

## 2022-07-25 NOTE — PROGRESS NOTES
"  Assessment & Plan   Problem List Items Addressed This Visit    None     Visit Diagnoses     Travel advice encounter    -  Primary           Travel consult for travel to [facility].  No malaria prophylaxis is needed.  He was given his typhoid vaccine today.  He inquired about getting refills of some of his chronic medications I told him he could call his pharmacy to get a hold of his PCP to do this.  Follow-up with us as needed.    No follow-ups on file.    Phil Bartlett MD  [facility]    Yang Jeffries is a 61-year-old gentleman who comes in today for travel consultation for a trip to [facility].  He has family that he is going to see during this trip.  He will be in [facility] and other urban areas.  No rural travel expected.  In review of his vaccinations he had his hepatitis A vaccine in 2017.  Due for a typhoid vaccine.      Objective    /78 (BP Location: Right arm, Patient Position: Sitting)   Pulse 76   Ht 1.626 m (5' 4\")   Wt 61.7 kg (136 lb)   SpO2 98%   BMI 23.34 kg/m    Body mass index is 23.34 kg/m .  Physical Exam               .  ..  "

## 2022-08-29 ENCOUNTER — APPOINTMENT (OUTPATIENT)
Dept: CT IMAGING | Facility: CLINIC | Age: 62
End: 2022-08-29
Payer: COMMERCIAL

## 2022-08-29 ENCOUNTER — HOSPITAL ENCOUNTER (EMERGENCY)
Facility: CLINIC | Age: 62
Discharge: HOME | End: 2022-08-29
Attending: EMERGENCY MEDICINE | Admitting: EMERGENCY MEDICINE
Payer: COMMERCIAL

## 2022-08-29 VITALS
BODY MASS INDEX: 23.56 KG/M2 | SYSTOLIC BLOOD PRESSURE: 122 MMHG | WEIGHT: 138 LBS | OXYGEN SATURATION: 98 % | DIASTOLIC BLOOD PRESSURE: 82 MMHG | HEIGHT: 64 IN | RESPIRATION RATE: 16 BRPM | HEART RATE: 58 BPM | TEMPERATURE: 98.3 F

## 2022-08-29 DIAGNOSIS — R07.9 CHEST PAIN, UNSPECIFIED TYPE: ICD-10-CM

## 2022-08-29 LAB
ALBUMIN SERPL-MCNC: 4.2 G/DL (ref 3.5–5)
ALP SERPL-CCNC: 63 U/L (ref 45–120)
ALT SERPL W P-5'-P-CCNC: 26 U/L (ref 0–45)
ANION GAP SERPL CALCULATED.3IONS-SCNC: 8 MMOL/L (ref 5–18)
AST SERPL W P-5'-P-CCNC: 18 U/L (ref 0–40)
ATRIAL RATE - MUSE: 62 BPM
BASOPHILS # BLD AUTO: 0 10E3/UL (ref 0–0.2)
BASOPHILS NFR BLD AUTO: 1 %
BILIRUB SERPL-MCNC: 0.5 MG/DL (ref 0–1)
BUN SERPL-MCNC: 7 MG/DL (ref 8–22)
CALCIUM SERPL-MCNC: 9.6 MG/DL (ref 8.5–10.5)
CHLORIDE BLD-SCNC: 101 MMOL/L (ref 98–107)
CO2 SERPL-SCNC: 28 MMOL/L (ref 22–31)
CREAT SERPL-MCNC: 1.03 MG/DL (ref 0.7–1.3)
DIASTOLIC BLOOD PRESSURE - MUSE: NORMAL MMHG
EOSINOPHIL # BLD AUTO: 0.3 10E3/UL (ref 0–0.7)
EOSINOPHIL NFR BLD AUTO: 5 %
ERYTHROCYTE [DISTWIDTH] IN BLOOD BY AUTOMATED COUNT: 13.1 % (ref 10–15)
GFR SERPL CREATININE-BSD FRML MDRD: 84 ML/MIN/1.73M2
GLUCOSE BLD-MCNC: 109 MG/DL (ref 70–125)
HCT VFR BLD AUTO: 40 % (ref 40–53)
HGB BLD-MCNC: 13.4 G/DL (ref 13.3–17.7)
IMM GRANULOCYTES # BLD: 0 10E3/UL
IMM GRANULOCYTES NFR BLD: 0 %
INTERPRETATION ECG - MUSE: NORMAL
LIPASE SERPL-CCNC: 54 U/L (ref 0–52)
LYMPHOCYTES # BLD AUTO: 1.2 10E3/UL (ref 0.8–5.3)
LYMPHOCYTES NFR BLD AUTO: 22 %
MCH RBC QN AUTO: 28.5 PG (ref 26.5–33)
MCHC RBC AUTO-ENTMCNC: 33.5 G/DL (ref 31.5–36.5)
MCV RBC AUTO: 85 FL (ref 78–100)
MONOCYTES # BLD AUTO: 0.4 10E3/UL (ref 0–1.3)
MONOCYTES NFR BLD AUTO: 7 %
NEUTROPHILS # BLD AUTO: 3.5 10E3/UL (ref 1.6–8.3)
NEUTROPHILS NFR BLD AUTO: 65 %
NRBC # BLD AUTO: 0 10E3/UL
NRBC BLD AUTO-RTO: 0 /100
P AXIS - MUSE: 42 DEGREES
PLATELET # BLD AUTO: 196 10E3/UL (ref 150–450)
POTASSIUM BLD-SCNC: 3.7 MMOL/L (ref 3.5–5)
PR INTERVAL - MUSE: 168 MS
PROT SERPL-MCNC: 7.2 G/DL (ref 6–8)
QRS DURATION - MUSE: 90 MS
QT - MUSE: 370 MS
QTC - MUSE: 375 MS
R AXIS - MUSE: 63 DEGREES
RBC # BLD AUTO: 4.71 10E6/UL (ref 4.4–5.9)
SODIUM SERPL-SCNC: 137 MMOL/L (ref 136–145)
SYSTOLIC BLOOD PRESSURE - MUSE: NORMAL MMHG
T AXIS - MUSE: 63 DEGREES
TROPONIN I SERPL-MCNC: 0.01 NG/ML (ref 0–0.29)
TROPONIN I SERPL-MCNC: <0.01 NG/ML (ref 0–0.29)
VENTRICULAR RATE- MUSE: 62 BPM
WBC # BLD AUTO: 5.4 10E3/UL (ref 4–11)

## 2022-08-29 PROCEDURE — 84484 ASSAY OF TROPONIN QUANT: CPT | Performed by: PHYSICIAN ASSISTANT

## 2022-08-29 PROCEDURE — 83690 ASSAY OF LIPASE: CPT | Performed by: PHYSICIAN ASSISTANT

## 2022-08-29 PROCEDURE — 84484 ASSAY OF TROPONIN QUANT: CPT | Mod: 91 | Performed by: PHYSICIAN ASSISTANT

## 2022-08-29 PROCEDURE — 71275 CT ANGIOGRAPHY CHEST: CPT

## 2022-08-29 PROCEDURE — 85025 COMPLETE CBC W/AUTO DIFF WBC: CPT | Performed by: PHYSICIAN ASSISTANT

## 2022-08-29 PROCEDURE — 250N000009 HC RX 250

## 2022-08-29 PROCEDURE — 93005 ELECTROCARDIOGRAM TRACING: CPT | Performed by: PHYSICIAN ASSISTANT

## 2022-08-29 PROCEDURE — 80053 COMPREHEN METABOLIC PANEL: CPT | Performed by: PHYSICIAN ASSISTANT

## 2022-08-29 PROCEDURE — 99285 EMERGENCY DEPT VISIT HI MDM: CPT | Mod: 25

## 2022-08-29 PROCEDURE — 36415 COLL VENOUS BLD VENIPUNCTURE: CPT | Performed by: EMERGENCY MEDICINE

## 2022-08-29 PROCEDURE — 36415 COLL VENOUS BLD VENIPUNCTURE: CPT | Performed by: PHYSICIAN ASSISTANT

## 2022-08-29 PROCEDURE — 74174 CTA ABD&PLVS W/CONTRAST: CPT

## 2022-08-29 PROCEDURE — 250N000013 HC RX MED GY IP 250 OP 250 PS 637

## 2022-08-29 PROCEDURE — 84484 ASSAY OF TROPONIN QUANT: CPT | Performed by: EMERGENCY MEDICINE

## 2022-08-29 PROCEDURE — 250N000011 HC RX IP 250 OP 636: Performed by: PHYSICIAN ASSISTANT

## 2022-08-29 RX ORDER — SUCRALFATE 1 G/1
1 TABLET ORAL 4 TIMES DAILY PRN
Qty: 40 TABLET | Refills: 0 | Status: SHIPPED | OUTPATIENT
Start: 2022-08-29 | End: 2022-08-29

## 2022-08-29 RX ORDER — FAMOTIDINE 40 MG/1
40 TABLET, FILM COATED ORAL DAILY
Qty: 30 TABLET | Refills: 0 | Status: SHIPPED | OUTPATIENT
Start: 2022-08-29 | End: 2022-08-29

## 2022-08-29 RX ORDER — IOPAMIDOL 755 MG/ML
100 INJECTION, SOLUTION INTRAVASCULAR ONCE
Status: COMPLETED | OUTPATIENT
Start: 2022-08-29 | End: 2022-08-29

## 2022-08-29 RX ORDER — SUCRALFATE 1 G/1
1 TABLET ORAL 4 TIMES DAILY PRN
Qty: 40 TABLET | Refills: 0 | Status: SHIPPED | OUTPATIENT
Start: 2022-08-29 | End: 2022-08-31

## 2022-08-29 RX ORDER — FAMOTIDINE 40 MG/1
40 TABLET, FILM COATED ORAL DAILY
Qty: 30 TABLET | Refills: 0 | Status: SHIPPED | OUTPATIENT
Start: 2022-08-29 | End: 2022-08-31

## 2022-08-29 RX ADMIN — ALUMINUM HYDROXIDE, MAGNESIUM HYDROXIDE, AND SIMETHICONE 30 ML: 200; 200; 20 SUSPENSION ORAL at 21:03

## 2022-08-29 RX ADMIN — IOPAMIDOL 100 ML: 755 INJECTION, SOLUTION INTRAVENOUS at 18:49

## 2022-08-29 ASSESSMENT — ACTIVITIES OF DAILY LIVING (ADL): ADLS_ACUITY_SCORE: 35

## 2022-08-29 NOTE — ED TRIAGE NOTES
Arrives to ED with c/o CP x3 days. Pain to upper chest, intermittent. Tried tylenol and pepto without relief. Reports has had similar episodes in past and dx with reflux.      Triage Assessment     Row Name 08/29/22 9835       Triage Assessment (Adult)    Airway WDL WDL       Respiratory WDL    Respiratory WDL WDL       Skin Circulation/Temperature WDL    Skin Circulation/Temperature WDL WDL       Cardiac WDL    Cardiac WDL X;chest pain       Peripheral/Neurovascular WDL    Peripheral Neurovascular WDL WDL       Cognitive/Neuro/Behavioral WDL    Cognitive/Neuro/Behavioral WDL WDL

## 2022-08-29 NOTE — ED PROVIDER NOTES
"ED Provider In Triage Note  [facility]  Encounter Date: Aug 29, 2022    Chief Complaint   Patient presents with     Chest Pain       Brief HPI:   Nesha Newton is a 61 year old male presenting to the Emergency Department with a chief complaint of chest pain, with radiation between shoulder blades.  Symptoms have been present 3 days, coming and going.    Brief Physical Exam:  BP (!) 130/97   Pulse 64   Temp 98.3  F (36.8  C) (Temporal)   Resp 16   Ht 1.626 m (5' 4\")   Wt 62.6 kg (138 lb)   SpO2 98%   BMI 23.69 kg/m    General: Non-toxic appearing  HEENT: Atraumatic  Resp: No respiratory distress  Abdomen: Non-peritoneal  Neuro: Alert, oriented, answers questions appropriately  Psych: Behavior appropriate      Plan Initiated in Triage:  Orders Placed This Encounter     CTA Chest Abdomen Pelvis w Contrast     Comprehensive metabolic panel     Lipase     Troponin I       PIT Dispo:   Return to lobby while awaiting workup and ED bed availability    Jas Krishna PA-C on 8/29/2022 at 5:53 PM    Patient was evaluated by the Physician in Triage due to a limitation of available rooms in the Emergency Department. A plan of care was discussed based on the information obtained on the initial evaluation and patient was consuled to return back to the Emergency Department lobby after this initial evalutaiton until results were obtained or a room became available in the Emergency Department. Patient was counseled not to leave prior to receiving the results of their workup.     Jas Krishna PA-C  [facility] EMERGENCY ROOM  [address] [place] [id number] [phone]     Jas Krishna PA-C  08/29/22 1802    "

## 2022-08-30 NOTE — ED PROVIDER NOTES
EMERGENCY DEPARTMENT ENCOUNTER      NAME: Nesha Newton  AGE: 61 year old male  YOB: 1960  MRN: [id number]  EVALUATION DATE & TIME: 8/29/2022  7:36 PM    PCP: Max Mart    ED PROVIDER: Tahmina Colbert M.D.      Chief Complaint   Patient presents with     Chest Pain         FINAL IMPRESSION:  1. Chest pain, unspecified type          ED COURSE & MEDICAL DECISION MAKING:    ED Course as of 08/29/22 2151   Mon Aug 29, 2022   2031 Pt with atypical positionally worsened chest pain episodes neurovascularly intact with reassuring VS, EKG and otherwise ROS negative. Patient low risk for ACS with HEART score 3, amenable to 2x troponin, CTA r/o aortic pathology or PE, and clinically reassess   2040 CTA reassuringly without acute aortic pathology or PE, 2nd troponin pending for 9pm   2150 2nd troponin reassuringly negative and patient feels improved, amenable to plan to discharge with primary care follow up. Patient discharged after being provided with extensive anticipatory guidance and given return precautions, importance of PMD follow-up emphasized.        Pertinent Labs & Imaging studies reviewed. (See chart for details)    N95 worn  A face shield was worn also  COVID PPE      At the conclusion of the encounter I discussed the results of all of the tests and the disposition. The questions were answered. The patient or family acknowledged understanding and was agreeable with the care plan.     MEDICATIONS GIVEN IN THE EMERGENCY:  Medications   iopamidol (ISOVUE-370) solution 100 mL (100 mLs Intravenous Given 8/29/22 1849)   lidocaine (viscous) (XYLOCAINE) 2 % 15 mL, alum & mag hydroxide-simethicone (MAALOX) 15 mL GI Cocktail (30 mLs Oral Given 8/29/22 2103)       NEW PRESCRIPTIONS STARTED AT TODAY'S ER VISIT  New Prescriptions    FAMOTIDINE (PEPCID) 40 MG TABLET    Take 1 tablet (40 mg) by mouth daily    SUCRALFATE (CARAFATE) 1 GM TABLET    Take 1 tablet (1 g) by mouth 4 times daily as needed (abdominal  pain)          =================================================================    HPI      Nesha Newton is a 61 year old male with PMHx of GERD who presents to the ED today via walking in with a family member with chest pain.     Patient presents with sharp and stabbing intermittent left sided chest pain that has been occurring for the past three days. He reports the pain radiates to his left back. He reports it is worse when he gets up from lying down. Patient reports he was sitting when the pain first started. His most recent episode was at 5:30 PM earlier this evening and the pain lasted one hour before going away.     Patient denies a history of pulmonary embolism, deep venous thrombosis, hypertension, and diabetes. He also denies any recent surgeries or trauma. He reports a family history of cerebral venous thrombosis. Patient denies leg swelling.    No other medical concerns or complaints at this time.     REVIEW OF SYSTEMS   All other systems reviewed and are negative except as noted above in HPI.    PAST MEDICAL HISTORY:  Past Medical History:   Diagnosis Date     Chronic gastritis without bleeding, unspecified gastritis type 4/3/2018     Coccidioidomycosis, unspecified     Created by Conversion      GERD (gastroesophageal reflux disease)      Hepatic steatosis 4/24/2017    HCV neg,      Osteoarthrosis involving lower leg     Created by Conversion  Replacement Utility updated for latest IMO load     Polyarthralgia 4/5/2017     Vitamin D deficiency        PAST SURGICAL HISTORY:  No past surgical history on file.    CURRENT MEDICATIONS:    famotidine (PEPCID) 40 MG tablet  sucralfate (CARAFATE) 1 GM tablet  acetaminophen (TYLENOL) 500 MG tablet  aspirin (ASA) 81 MG EC tablet  cholecalciferol 50 MCG (2000 UT) CAPS  dicyclomine (BENTYL) 20 MG tablet  docusate sodium (EQ STOOL SOFTENER) 100 MG capsule  famotidine (PEPCID) 40 MG tablet  gabapentin (NEURONTIN) 100 MG capsule  hydrocortisone-pramoxine  "(ANALPRAM-HC) 2.5-1 % rectal cream  hydroxychloroquine (PLAQUENIL) 200 MG tablet  ibuprofen (ADVIL/MOTRIN) 600 MG tablet  lidocaine (XYLOCAINE) 5 % external ointment  neomycin-polymyxin-hydrocortisone (CORTISPORIN) 3.5-86446-1 otic suspension  pantoprazole (PROTONIX) 40 MG EC tablet  polyethylene glycol (MIRALAX) 17 GM/Dose powder  Pramoxine-HC (HYDROCORTISONE ACE-PRAMOXINE) 2.5-1 % CREA  propranoloL (INDERAL LA) 60 mg 24 hr capsule  SENNA-S 8.6-50 MG tablet  sucralfate (CARAFATE) 1 GM tablet  tamsulosin (FLOMAX) 0.4 MG capsule  VITAMIN D3 50 MCG (2000 UT) tablet        ALLERGIES:  Allergies   Allergen Reactions     Tramadol Itching     Pt states itchy all over     Primidone Other (See Comments)     Dizziness and vomitting       FAMILY HISTORY:  Family History   Problem Relation Age of Onset     Diabetes Mother        SOCIAL HISTORY:   Social History     Socioeconomic History     Marital status:    Tobacco Use     Smoking status: Never Smoker     Smokeless tobacco: Never Used   Substance and Sexual Activity     Alcohol use: No     Drug use: No       VITALS:  Patient Vitals for the past 24 hrs:   BP Temp Temp src Pulse Resp SpO2 Height Weight   08/29/22 1940 119/77 -- -- 58 16 97 % -- --   08/29/22 1748 (!) 130/97 98.3  F (36.8  C) Temporal 64 16 98 % 1.626 m (5' 4\") 62.6 kg (138 lb)       PHYSICAL EXAM    GENERAL: Awake, alert.  In no acute distress.   HEENT: Normocephalic, atraumatic.  Pupils equal, round and reactive.  Conjunctiva normal.  EOMI.  NECK: No stridor or apparent deformity.  PULMONARY: Symmetrical breath sounds without distress.  Lungs clear to auscultation bilaterally without wheezes, rhonchi or rales.  CARDIO: Regular rate and rhythm.  No significant murmur, rub or gallop.  Radial pulses strong and symmetrical.  ABDOMINAL: Abdomen soft, non-distended and non-tender to palpation.  No CVAT, no palpable hepatosplenomegaly.  EXTREMITIES: No lower extremity swelling or edema.    NEURO: Alert and " oriented to person, place and time.  Cranial nerves grossly intact.  No focal motor deficit.  PSYCH: Normal mood and affect  SKIN: No rashes      LAB:  All pertinent labs reviewed and interpreted.  Results for orders placed or performed during the hospital encounter of 08/29/22   CTA Chest Abdomen Pelvis w Contrast    Impression    IMPRESSION:  1.  No pulmonary embolism.    2.  Thoracic and abdominal aorta negative for aneurysm or dissection.    3.  Normal appendix  .  4.  Prostatic enlargement.    5.  Constipation.     Comprehensive metabolic panel   Result Value Ref Range    Sodium 137 136 - 145 mmol/L    Potassium 3.7 3.5 - 5.0 mmol/L    Chloride 101 98 - 107 mmol/L    Carbon Dioxide (CO2) 28 22 - 31 mmol/L    Anion Gap 8 5 - 18 mmol/L    Urea Nitrogen 7 (L) 8 - 22 mg/dL    Creatinine 1.03 0.70 - 1.30 mg/dL    Calcium 9.6 8.5 - 10.5 mg/dL    Glucose 109 70 - 125 mg/dL    Alkaline Phosphatase 63 45 - 120 U/L    AST 18 0 - 40 U/L    ALT 26 0 - 45 U/L    Protein Total 7.2 6.0 - 8.0 g/dL    Albumin 4.2 3.5 - 5.0 g/dL    Bilirubin Total 0.5 0.0 - 1.0 mg/dL    GFR Estimate 84 >60 mL/min/1.73m2   Result Value Ref Range    Lipase 54 (H) 0 - 52 U/L   Result Value Ref Range    Troponin I 0.01 0.00 - 0.29 ng/mL   CBC with platelets and differential   Result Value Ref Range    WBC Count 5.4 4.0 - 11.0 10e3/uL    RBC Count 4.71 4.40 - 5.90 10e6/uL    Hemoglobin 13.4 13.3 - 17.7 g/dL    Hematocrit 40.0 40.0 - 53.0 %    MCV 85 78 - 100 fL    MCH 28.5 26.5 - 33.0 pg    MCHC 33.5 31.5 - 36.5 g/dL    RDW 13.1 10.0 - 15.0 %    Platelet Count 196 150 - 450 10e3/uL    % Neutrophils 65 %    % Lymphocytes 22 %    % Monocytes 7 %    % Eosinophils 5 %    % Basophils 1 %    % Immature Granulocytes 0 %    NRBCs per 100 WBC 0 <1 /100    Absolute Neutrophils 3.5 1.6 - 8.3 10e3/uL    Absolute Lymphocytes 1.2 0.8 - 5.3 10e3/uL    Absolute Monocytes 0.4 0.0 - 1.3 10e3/uL    Absolute Eosinophils 0.3 0.0 - 0.7 10e3/uL    Absolute Basophils 0.0  0.0 - 0.2 10e3/uL    Absolute Immature Granulocytes 0.0 <=0.4 10e3/uL    Absolute NRBCs 0.0 10e3/uL   Result Value Ref Range    Troponin I <0.01 0.00 - 0.29 ng/mL   ECG 12-LEAD WITH MUSE (LHE)   Result Value Ref Range    Systolic Blood Pressure  mmHg    Diastolic Blood Pressure  mmHg    Ventricular Rate 62 BPM    Atrial Rate 62 BPM    MI Interval 168 ms    QRS Duration 90 ms     ms    QTc 375 ms    P Axis 42 degrees    R AXIS 63 degrees    T Axis 63 degrees    Interpretation ECG       Sinus rhythm  Normal ECG  When compared with ECG of 04-SEP-2021 19:15,  No significant change was found  Confirmed by SEE ED PROVIDER NOTE FOR, ECG INTERPRETATION ([id number]),  BROOKLYNN RODRIGUEZ ([id number]) on 8/29/2022 6:03:22 PM         RADIOLOGY:  Reviewed all pertinent imaging. Please see official radiology report.  CTA Chest Abdomen Pelvis w Contrast   Final Result   IMPRESSION:   1.  No pulmonary embolism.      2.  Thoracic and abdominal aorta negative for aneurysm or dissection.      3.  Normal appendix   .   4.  Prostatic enlargement.      5.  Constipation.               EKG:    Reviewed and interpreted as: 29-AUG-2022 17:49:33    62 BPM    Sinus rhythm. Normal ECG. When compared with ECG of 04-SEP-2021 19:15, No significant change was found.       I have independently reviewed and interpreted the EKG(s) documented above.        I, Brian Beach, am serving as a scribe to document services personally performed by Dr. Tahmina Colbert based on my observation and the provider's statements to me. I, Tahmina Colbert MD attest that Brian Beach is acting in a scribe capacity, has observed my performance of the services and has documented them in accordance with my direction.     Tahmina Colbert MD  08/29/22 3439     (E4) spontaneous

## 2022-08-31 ENCOUNTER — OFFICE VISIT (OUTPATIENT)
Dept: CARDIOLOGY | Facility: CLINIC | Age: 62
End: 2022-08-31
Attending: EMERGENCY MEDICINE
Payer: COMMERCIAL

## 2022-08-31 VITALS
HEART RATE: 74 BPM | DIASTOLIC BLOOD PRESSURE: 66 MMHG | HEIGHT: 64 IN | WEIGHT: 147 LBS | SYSTOLIC BLOOD PRESSURE: 102 MMHG | BODY MASS INDEX: 25.1 KG/M2 | RESPIRATION RATE: 20 BRPM

## 2022-08-31 DIAGNOSIS — R07.9 CHEST PAIN, UNSPECIFIED TYPE: ICD-10-CM

## 2022-08-31 PROCEDURE — 99244 OFF/OP CNSLTJ NEW/EST MOD 40: CPT | Performed by: INTERNAL MEDICINE

## 2022-08-31 RX ORDER — COLCHICINE 0.6 MG/1
0.6 TABLET ORAL DAILY
Qty: 90 TABLET | Refills: 3 | Status: SHIPPED | OUTPATIENT
Start: 2022-08-31 | End: 2023-09-15

## 2022-08-31 NOTE — LETTER
8/31/2022    Max Mart MD  [address] [place] [id number]    RE: Nesha Newton       Dear Colleague,     I had the pleasure of seeing Nesha Newton in the [facility].    HEART CARE ENCOUNTER CONSULTATON NOTE      [facility]  [phone]      Assessment/Recommendations   Assessment/Plan:  Chest pain, atypical - I suspect that this is not ischemic, but given his chronically abnormal EKG I am going to send him for an exercise nuclear stress test to risk stratify.  This could be pericarditis. He notes being told that he cannot use NSAIDS and should use tylenol instead (from an ER visit for vomiting). Will start colchicine daily and go from there while awaiting the stress test results. Consider echo.    Chronically abnormal EKG - most likely repolarization abnormality. Exam is benign.    Risk modification - will encourage exercise, diet pending his stress test results.    F/U 1 month       History of Present Illness/Subjective    HPI: Nesha Newton is a 61 year old male originally from [facility], who spent time in [facility], who has polyarthritis, GERD and a chronically abnormal EKG consistent with repolarization.  He presented to the ED this week with chest pain.  His college student daughter provides translation today.  Over the past year he has had three day long episodes of upper left chest pain that radiates to his back.  It worsens with movement and maybe when laying down, and seems to improve with deep breaths.  There is no relationship to food, activity, and he denies injury. There is no dyspnea and he denies palpitations. In the ER his EKG was NSR with diffuse ST elevation, which is chronic. CTA chest showed no PE, dissection or coronary calcification. Nesha was prescribed famotidine and simethicone and sent to cardiology for follow up. He notes that the pain continues, and is present currently. It is never debilitating and it does not limit his activities.  His is  "active around his home but does not exercise otherwise. He also notes that his PCP retired and he needs a new doctor.       Physical Examination  Review of Systems   Vitals: /66 (BP Location: Right arm, Patient Position: Sitting, Cuff Size: Adult Regular)   Pulse 74   Resp 20   Ht 1.626 m (5' 4\")   Wt 66.7 kg (147 lb)   BMI 25.23 kg/m    BMI= Body mass index is 25.23 kg/m .  Wt Readings from Last 3 Encounters:   08/31/22 66.7 kg (147 lb)   08/29/22 62.6 kg (138 lb)   07/25/22 61.7 kg (136 lb)       General Appearance:   no distress, normal body habitus   ENT/Mouth: membranes moist, no oral lesions or bleeding gums.      EYES:  no scleral icterus, normal conjunctivae   Neck: no carotid bruits or thyromegaly   Chest/Lungs:   lungs are clear to auscultation, no rales or wheezing, no sternal scar, equal chest wall expansion    Cardiovascular:   Regular. Normal first and second heart sounds with no murmur. No rubs or gallops; the right carotid, radial and posterior tibial pulses are intact and the left carotid, radial and posterior tibial pulses are intact.  Jugular venous pressure is flat, no edema bilaterally    Abdomen:  no organomegaly, masses, bruits, or tenderness; bowel sounds are present   Extremities: no cyanosis or clubbing   Skin: no xanthelasma, warm.    Neurologic: normal  bilateral, no tremors     Psychiatric: alert and oriented x3, calm        Please refer above for cardiac ROS details.        Medical History  Surgical History Family History Social History   Past Medical History:   Diagnosis Date     Chronic gastritis without bleeding, unspecified gastritis type 4/3/2018     Coccidioidomycosis, unspecified     Created by Conversion      GERD (gastroesophageal reflux disease)      Hepatic steatosis 4/24/2017    HCV neg,      Osteoarthrosis involving lower leg     Created by Conversion  Replacement Utility updated for latest IMO load     Polyarthralgia 4/5/2017     Vitamin D deficiency      No " past surgical history on file.  Family History   Problem Relation Age of Onset     Diabetes Mother         Social History     Socioeconomic History     Marital status:      Spouse name: Not on file     Number of children: Not on file     Years of education: Not on file     Highest education level: Not on file   Occupational History     Not on file   Tobacco Use     Smoking status: Never Smoker     Smokeless tobacco: Never Used   Substance and Sexual Activity     Alcohol use: No     Drug use: No     Sexual activity: Not on file   Other Topics Concern     Not on file   Social History Narrative     Not on file     Social Determinants of Health     Financial Resource Strain: Not on file   Food Insecurity: Not on file   Transportation Needs: Not on file   Physical Activity: Not on file   Stress: Not on file   Social Connections: Not on file   Intimate Partner Violence: Not on file   Housing Stability: Not on file           Medications  Allergies   Current Outpatient Medications   Medication Sig Dispense Refill     acetaminophen (TYLENOL) 500 MG tablet Take 2 tablets (1,000 mg) by mouth every 6 hours as needed for fever or pain 100 tablet 1     aspirin (ASA) 81 MG EC tablet Take 1 tablet (81 mg) by mouth daily 90 tablet 1     cholecalciferol 50 MCG (2000 UT) CAPS Take 2,000 Units by mouth daily 30 capsule 5     colchicine (COLCYRS) 0.6 MG tablet Take 1 tablet (0.6 mg) by mouth daily 90 tablet 3     dicyclomine (BENTYL) 20 MG tablet TAKE 1 TABLET BY MOUTH 4 TIMES DAILY AS NEEDED FOR ABDOMINAL PAIN CRAMPING 120 tablet 1     docusate sodium (EQ STOOL SOFTENER) 100 MG capsule TAKE 1 CAPSULE BY MOUTH TWICE DAILY AS NEEDED 60 capsule 11     famotidine (PEPCID) 40 MG tablet Take 1 tablet (40 mg) by mouth every evening 30 tablet 5     gabapentin (NEURONTIN) 100 MG capsule Take 1 capsule (100 mg) by mouth 3 times daily TAKE 1 CAPSULE BY MOUTH THREE TIMES DAILY;  Needs visit prior to future refills. 90 capsule 0      hydrocortisone-pramoxine (ANALPRAM-HC) 2.5-1 % rectal cream [HYDROCORTISONE-PRAMOXINE (ANALPRAM-HC) 2.5-1 % RECTAL CREAM] Insert into the rectum 3 (three) times a day. Apply  Externally bid as directed 30 g 1     hydroxychloroquine (PLAQUENIL) 200 MG tablet Take 1 tablet (200 mg) by mouth daily 90 tablet 1     ibuprofen (ADVIL/MOTRIN) 600 MG tablet [IBUPROFEN (ADVIL,MOTRIN) 600 MG TABLET] TAKE 1 TABLET BY MOUTH THREE TIMES DAILY AS NEEDED FOR PAIN 60 tablet 3     lidocaine (XYLOCAINE) 5 % external ointment [LIDOCAINE (XYLOCAINE) 5 % OINTMENT] Apply two times a day prn 35.44 g 1     neomycin-polymyxin-hydrocortisone (CORTISPORIN) 3.5-15361-8 otic suspension Place 3 drops into both ears 3 times daily 10 mL 1     pantoprazole (PROTONIX) 40 MG EC tablet Take 1 tablet (40 mg) by mouth daily 30 tablet 5     polyethylene glycol (MIRALAX) 17 GM/Dose powder Take 17 g by mouth 2 times daily 765 g 5     Pramoxine-HC (HYDROCORTISONE ACE-PRAMOXINE) 2.5-1 % CREA Apply topically BID to hemorrhoids as needed 57 g 1     propranoloL (INDERAL LA) 60 mg 24 hr capsule [PROPRANOLOL (INDERAL LA) 60 MG 24 HR CAPSULE] Take 1 capsule (60 mg total) by mouth daily. 30 capsule 5     SENNA-S 8.6-50 MG tablet TAKE 1 TABLET BY MOUTH TWICE DAILY AS NEEDED FOR  CONSTIPATION 30 tablet 11     sucralfate (CARAFATE) 1 GM tablet [SUCRALFATE (CARAFATE) 1 GRAM TABLET] 1 po four times a day before meals and at bedtime as needed for reflux 120 tablet 3     tamsulosin (FLOMAX) 0.4 MG capsule Take 1 capsule (0.4 mg) by mouth daily 30 capsule 3     VITAMIN D3 50 MCG (2000 UT) tablet Take 1 tablet (50 mcg) by mouth daily 90 tablet 1       Allergies   Allergen Reactions     Tramadol Itching     Pt states itchy all over     Primidone Other (See Comments)     Dizziness and vomitting          Lab Results    Chemistry/lipid CBC Cardiac Enzymes/BNP/TSH/INR   Recent Labs   Lab Test 05/03/21  0840   CHOL 218*   HDL 35*      TRIG 337*     Recent Labs   Lab Test  05/03/21  0840 11/12/18  0840 09/28/17  0837    81 91     Recent Labs   Lab Test 08/29/22  1759      POTASSIUM 3.7   CHLORIDE 101   CO2 28      BUN 7*   CR 1.03   GFRESTIMATED 84   KELSEY 9.6     Recent Labs   Lab Test 08/29/22 1759 05/01/22  0422 02/21/22  1114   CR 1.03 1.03 1.19     No results for input(s): A1C in the last 69403 hours.       Recent Labs   Lab Test 08/29/22 1759   WBC 5.4   HGB 13.4   HCT 40.0   MCV 85        Recent Labs   Lab Test 08/29/22 1759 05/01/22  0422 02/21/22  1114   HGB 13.4 12.6* 14.9    Recent Labs   Lab Test 08/29/22 2103 08/29/22 1759 09/04/21 2005   TROPONINI <0.01 0.01 <0.01     No results for input(s): BNP, NTBNPI, NTBNP in the last 80555 hours.  No results for input(s): TSH in the last 61408 hours.  No results for input(s): INR in the last 13588 hours.     Today's clinic visit entailed:  Review of prior external note(s) from - [facility] information from epic reviewed  40 minutes spent on the date of the encounter doing chart review, review of outside records, review of test results, interpretation of tests, patient visit and documentation   Provider  Link to [facility] Help Grid     The level of medical decision making during this visit was of high complexity.        Tahmina Haddad MD              Thank you for allowing me to participate in the care of your patient.      Sincerely,     Tahmina Haddad MD     [facility]  cc:   Tahmina Colbert MD  [facility] [id number]

## 2022-08-31 NOTE — PATIENT INSTRUCTIONS
It was a pleasure seeing you at [facility] today.        Here are my suggestions for your care:    1.  Start taking colchicine daily    2.  Schedule a stress test at the         Let's meet again in 1 months.    You can always call my nurse Corrie Carolina RN who is a nurse helping me in the care of my patients. She can be reached at [phone] if you have any questions.    For scheduling, please call my  Soledad Coulter at [phone].    Thank you again for trusting me with your care. Please feel free to call my office at any time if you have any question or if I can assist you in any way.    Tahmina Haddad MD  [facility]

## 2022-08-31 NOTE — PROGRESS NOTES
HEART CARE ENCOUNTER CONSULTATON NOTE      [facility]  [phone]      Assessment/Recommendations   Assessment/Plan:  Chest pain, atypical - I suspect that this is not ischemic, but given his chronically abnormal EKG I am going to send him for an exercise nuclear stress test to risk stratify.  This could be pericarditis. He notes being told that he cannot use NSAIDS and should use tylenol instead (from an ER visit for vomiting). Will start colchicine daily and go from there while awaiting the stress test results. Consider echo.    Chronically abnormal EKG - most likely repolarization abnormality. Exam is benign.    Risk modification - will encourage exercise, diet pending his stress test results.    F/U 1 month       History of Present Illness/Subjective    HPI: Nesha Newton is a 61 year old male originally from [facility], who spent time in [facility], who has polyarthritis, GERD and a chronically abnormal EKG consistent with repolarization.  He presented to the ED this week with chest pain.  His college student daughter provides translation today.  Over the past year he has had three day long episodes of upper left chest pain that radiates to his back.  It worsens with movement and maybe when laying down, and seems to improve with deep breaths.  There is no relationship to food, activity, and he denies injury. There is no dyspnea and he denies palpitations. In the ER his EKG was NSR with diffuse ST elevation, which is chronic. CTA chest showed no PE, dissection or coronary calcification. Nesha was prescribed famotidine and simethicone and sent to cardiology for follow up. He notes that the pain continues, and is present currently. It is never debilitating and it does not limit his activities.  His is active around his home but does not exercise otherwise. He also notes that his PCP retired and he needs a new doctor.       Physical Examination  Review of Systems   Vitals: /66 (BP Location:  "Right arm, Patient Position: Sitting, Cuff Size: Adult Regular)   Pulse 74   Resp 20   Ht 1.626 m (5' 4\")   Wt 66.7 kg (147 lb)   BMI 25.23 kg/m    BMI= Body mass index is 25.23 kg/m .  Wt Readings from Last 3 Encounters:   08/31/22 66.7 kg (147 lb)   08/29/22 62.6 kg (138 lb)   07/25/22 61.7 kg (136 lb)       General Appearance:   no distress, normal body habitus   ENT/Mouth: membranes moist, no oral lesions or bleeding gums.      EYES:  no scleral icterus, normal conjunctivae   Neck: no carotid bruits or thyromegaly   Chest/Lungs:   lungs are clear to auscultation, no rales or wheezing, no sternal scar, equal chest wall expansion    Cardiovascular:   Regular. Normal first and second heart sounds with no murmur. No rubs or gallops; the right carotid, radial and posterior tibial pulses are intact and the left carotid, radial and posterior tibial pulses are intact.  Jugular venous pressure is flat, no edema bilaterally    Abdomen:  no organomegaly, masses, bruits, or tenderness; bowel sounds are present   Extremities: no cyanosis or clubbing   Skin: no xanthelasma, warm.    Neurologic: normal  bilateral, no tremors     Psychiatric: alert and oriented x3, calm        Please refer above for cardiac ROS details.        Medical History  Surgical History Family History Social History   Past Medical History:   Diagnosis Date     Chronic gastritis without bleeding, unspecified gastritis type 4/3/2018     Coccidioidomycosis, unspecified     Created by Conversion      GERD (gastroesophageal reflux disease)      Hepatic steatosis 4/24/2017    HCV neg,      Osteoarthrosis involving lower leg     Created by Conversion  Replacement Utility updated for latest IMO load     Polyarthralgia 4/5/2017     Vitamin D deficiency      No past surgical history on file.  Family History   Problem Relation Age of Onset     Diabetes Mother         Social History     Socioeconomic History     Marital status:      Spouse name: Not " on file     Number of children: Not on file     Years of education: Not on file     Highest education level: Not on file   Occupational History     Not on file   Tobacco Use     Smoking status: Never Smoker     Smokeless tobacco: Never Used   Substance and Sexual Activity     Alcohol use: No     Drug use: No     Sexual activity: Not on file   Other Topics Concern     Not on file   Social History Narrative     Not on file     Social Determinants of Health     Financial Resource Strain: Not on file   Food Insecurity: Not on file   Transportation Needs: Not on file   Physical Activity: Not on file   Stress: Not on file   Social Connections: Not on file   Intimate Partner Violence: Not on file   Housing Stability: Not on file           Medications  Allergies   Current Outpatient Medications   Medication Sig Dispense Refill     acetaminophen (TYLENOL) 500 MG tablet Take 2 tablets (1,000 mg) by mouth every 6 hours as needed for fever or pain 100 tablet 1     aspirin (ASA) 81 MG EC tablet Take 1 tablet (81 mg) by mouth daily 90 tablet 1     cholecalciferol 50 MCG (2000 UT) CAPS Take 2,000 Units by mouth daily 30 capsule 5     colchicine (COLCYRS) 0.6 MG tablet Take 1 tablet (0.6 mg) by mouth daily 90 tablet 3     dicyclomine (BENTYL) 20 MG tablet TAKE 1 TABLET BY MOUTH 4 TIMES DAILY AS NEEDED FOR ABDOMINAL PAIN CRAMPING 120 tablet 1     docusate sodium (EQ STOOL SOFTENER) 100 MG capsule TAKE 1 CAPSULE BY MOUTH TWICE DAILY AS NEEDED 60 capsule 11     famotidine (PEPCID) 40 MG tablet Take 1 tablet (40 mg) by mouth every evening 30 tablet 5     gabapentin (NEURONTIN) 100 MG capsule Take 1 capsule (100 mg) by mouth 3 times daily TAKE 1 CAPSULE BY MOUTH THREE TIMES DAILY;  Needs visit prior to future refills. 90 capsule 0     hydrocortisone-pramoxine (ANALPRAM-HC) 2.5-1 % rectal cream [HYDROCORTISONE-PRAMOXINE (ANALPRAM-HC) 2.5-1 % RECTAL CREAM] Insert into the rectum 3 (three) times a day. Apply  Externally bid as directed 30  g 1     hydroxychloroquine (PLAQUENIL) 200 MG tablet Take 1 tablet (200 mg) by mouth daily 90 tablet 1     ibuprofen (ADVIL/MOTRIN) 600 MG tablet [IBUPROFEN (ADVIL,MOTRIN) 600 MG TABLET] TAKE 1 TABLET BY MOUTH THREE TIMES DAILY AS NEEDED FOR PAIN 60 tablet 3     lidocaine (XYLOCAINE) 5 % external ointment [LIDOCAINE (XYLOCAINE) 5 % OINTMENT] Apply two times a day prn 35.44 g 1     neomycin-polymyxin-hydrocortisone (CORTISPORIN) 3.5-58212-5 otic suspension Place 3 drops into both ears 3 times daily 10 mL 1     pantoprazole (PROTONIX) 40 MG EC tablet Take 1 tablet (40 mg) by mouth daily 30 tablet 5     polyethylene glycol (MIRALAX) 17 GM/Dose powder Take 17 g by mouth 2 times daily 765 g 5     Pramoxine-HC (HYDROCORTISONE ACE-PRAMOXINE) 2.5-1 % CREA Apply topically BID to hemorrhoids as needed 57 g 1     propranoloL (INDERAL LA) 60 mg 24 hr capsule [PROPRANOLOL (INDERAL LA) 60 MG 24 HR CAPSULE] Take 1 capsule (60 mg total) by mouth daily. 30 capsule 5     SENNA-S 8.6-50 MG tablet TAKE 1 TABLET BY MOUTH TWICE DAILY AS NEEDED FOR  CONSTIPATION 30 tablet 11     sucralfate (CARAFATE) 1 GM tablet [SUCRALFATE (CARAFATE) 1 GRAM TABLET] 1 po four times a day before meals and at bedtime as needed for reflux 120 tablet 3     tamsulosin (FLOMAX) 0.4 MG capsule Take 1 capsule (0.4 mg) by mouth daily 30 capsule 3     VITAMIN D3 50 MCG (2000 UT) tablet Take 1 tablet (50 mcg) by mouth daily 90 tablet 1       Allergies   Allergen Reactions     Tramadol Itching     Pt states itchy all over     Primidone Other (See Comments)     Dizziness and vomitting          Lab Results    Chemistry/lipid CBC Cardiac Enzymes/BNP/TSH/INR   Recent Labs   Lab Test 05/03/21  0840   CHOL 218*   HDL 35*      TRIG 337*     Recent Labs   Lab Test 05/03/21  0840 11/12/18  0840 09/28/17  0837    81 91     Recent Labs   Lab Test 08/29/22  1759      POTASSIUM 3.7   CHLORIDE 101   CO2 28      BUN 7*   CR 1.03   GFRESTIMATED 84   KELSEY  9.6     Recent Labs   Lab Test 08/29/22 1759 05/01/22 0422 02/21/22  1114   CR 1.03 1.03 1.19     No results for input(s): A1C in the last 03370 hours.       Recent Labs   Lab Test 08/29/22 1759   WBC 5.4   HGB 13.4   HCT 40.0   MCV 85        Recent Labs   Lab Test 08/29/22 1759 05/01/22 0422 02/21/22  1114   HGB 13.4 12.6* 14.9    Recent Labs   Lab Test 08/29/22 2103 08/29/22 1759 09/04/21 2005   TROPONINI <0.01 0.01 <0.01     No results for input(s): BNP, NTBNPI, NTBNP in the last 55735 hours.  No results for input(s): TSH in the last 42861 hours.  No results for input(s): INR in the last 83377 hours.     Today's clinic visit entailed:  Review of prior external note(s) from - [facility] information from epic reviewed  40 minutes spent on the date of the encounter doing chart review, review of outside records, review of test results, interpretation of tests, patient visit and documentation   Provider  Link to [facility] Help Grid     The level of medical decision making during this visit was of high complexity.        Tahmina Haddad MD

## 2022-09-09 ENCOUNTER — HOSPITAL ENCOUNTER (OUTPATIENT)
Dept: NUCLEAR MEDICINE | Facility: CLINIC | Age: 62
Discharge: HOME | End: 2022-09-09
Attending: INTERNAL MEDICINE
Payer: COMMERCIAL

## 2022-09-09 ENCOUNTER — HOSPITAL ENCOUNTER (OUTPATIENT)
Dept: CARDIOLOGY | Facility: CLINIC | Age: 62
Discharge: HOME | End: 2022-09-09
Attending: INTERNAL MEDICINE
Payer: COMMERCIAL

## 2022-09-09 DIAGNOSIS — R07.9 CHEST PAIN, UNSPECIFIED TYPE: ICD-10-CM

## 2022-09-09 LAB
CV STRESS CURRENT BP HE: NORMAL
CV STRESS CURRENT HR HE: 100
CV STRESS CURRENT HR HE: 101
CV STRESS CURRENT HR HE: 101
CV STRESS CURRENT HR HE: 102
CV STRESS CURRENT HR HE: 105
CV STRESS CURRENT HR HE: 106
CV STRESS CURRENT HR HE: 108
CV STRESS CURRENT HR HE: 115
CV STRESS CURRENT HR HE: 118
CV STRESS CURRENT HR HE: 119
CV STRESS CURRENT HR HE: 71
CV STRESS CURRENT HR HE: 74
CV STRESS CURRENT HR HE: 80
CV STRESS CURRENT HR HE: 81
CV STRESS CURRENT HR HE: 83
CV STRESS CURRENT HR HE: 88
CV STRESS CURRENT HR HE: 88
CV STRESS CURRENT HR HE: 93
CV STRESS CURRENT HR HE: 94
CV STRESS CURRENT HR HE: 95
CV STRESS CURRENT HR HE: 95
CV STRESS CURRENT HR HE: 96
CV STRESS CURRENT HR HE: 98
CV STRESS CURRENT HR HE: 99
CV STRESS DEVIATION TIME HE: NORMAL
CV STRESS ECHO PERCENT HR HE: NORMAL
CV STRESS EXERCISE STAGE HE: NORMAL
CV STRESS FINAL RESTING BP HE: NORMAL
CV STRESS FINAL RESTING HR HE: 80
CV STRESS MAX HR HE: 119
CV STRESS MAX TREADMILL GRADE HE: 0
CV STRESS MAX TREADMILL SPEED HE: 0
CV STRESS PEAK DIA BP HE: NORMAL
CV STRESS PEAK SYS BP HE: NORMAL
CV STRESS PHASE HE: NORMAL
CV STRESS PROTOCOL HE: NORMAL
CV STRESS RESTING PT POSITION HE: NORMAL
CV STRESS RESTING PT POSITION HE: NORMAL
CV STRESS ST DEVIATION AMOUNT HE: NORMAL
CV STRESS ST DEVIATION ELEVATION HE: NORMAL
CV STRESS ST EVELATION AMOUNT HE: NORMAL
CV STRESS TEST TYPE HE: NORMAL
CV STRESS TOTAL STAGE TIME MIN 1 HE: NORMAL
RATE PRESSURE PRODUCT: NORMAL
STRESS ECHO BASELINE DIASTOLIC HE: 78
STRESS ECHO BASELINE HR: 71
STRESS ECHO BASELINE SYSTOLIC BP: 104
STRESS ECHO CALCULATED PERCENT HR: 74 %
STRESS ECHO LAST STRESS DIASTOLIC BP: 72
STRESS ECHO LAST STRESS HR: 99
STRESS ECHO LAST STRESS SYSTOLIC BP: 108
STRESS ECHO TARGET HR: 161

## 2022-09-09 PROCEDURE — 93017 CV STRESS TEST TRACING ONLY: CPT

## 2022-09-09 PROCEDURE — 78452 HT MUSCLE IMAGE SPECT MULT: CPT

## 2022-09-09 PROCEDURE — A9500 TC99M SESTAMIBI: HCPCS | Performed by: INTERNAL MEDICINE

## 2022-09-09 PROCEDURE — 93018 CV STRESS TEST I&R ONLY: CPT | Performed by: INTERNAL MEDICINE

## 2022-09-09 PROCEDURE — 93017 CV STRESS TEST TRACING ONLY: CPT | Performed by: INTERNAL MEDICINE

## 2022-09-09 PROCEDURE — 343N000001 HC RX 343: Performed by: INTERNAL MEDICINE

## 2022-09-09 PROCEDURE — 78452 HT MUSCLE IMAGE SPECT MULT: CPT | Mod: 26 | Performed by: INTERNAL MEDICINE

## 2022-09-09 PROCEDURE — 250N000011 HC RX IP 250 OP 636: Performed by: INTERNAL MEDICINE

## 2022-09-09 PROCEDURE — 93016 CV STRESS TEST SUPVJ ONLY: CPT | Performed by: INTERNAL MEDICINE

## 2022-09-09 RX ORDER — REGADENOSON 0.08 MG/ML
0.4 INJECTION, SOLUTION INTRAVENOUS ONCE
Status: COMPLETED | OUTPATIENT
Start: 2022-09-09 | End: 2022-09-09

## 2022-09-09 RX ORDER — AMINOPHYLLINE 25 MG/ML
50 INJECTION, SOLUTION INTRAVENOUS
Status: DISCONTINUED | OUTPATIENT
Start: 2022-09-09 | End: 2022-09-09 | Stop reason: HOSPADM

## 2022-09-09 RX ADMIN — REGADENOSON 0.4 MG: 0.08 INJECTION, SOLUTION INTRAVENOUS at 08:06

## 2022-09-09 RX ADMIN — Medication 8.75 MILLICURIE: at 07:30

## 2022-09-09 RX ADMIN — AMINOPHYLLINE 50 MG: 25 INJECTION, SOLUTION INTRAVENOUS at 08:13

## 2022-09-09 RX ADMIN — Medication 31.1 MILLICURIE: at 08:00

## 2022-09-19 DIAGNOSIS — N40.0 BENIGN PROSTATIC HYPERPLASIA, UNSPECIFIED WHETHER LOWER URINARY TRACT SYMPTOMS PRESENT: ICD-10-CM

## 2022-09-20 ENCOUNTER — TRANSFERRED RECORDS (OUTPATIENT)
Dept: HEALTH INFORMATION MANAGEMENT | Facility: CLINIC | Age: 62
End: 2022-09-20

## 2022-09-21 RX ORDER — TAMSULOSIN HYDROCHLORIDE 0.4 MG/1
CAPSULE ORAL
Qty: 90 CAPSULE | Refills: 3 | Status: SHIPPED | OUTPATIENT
Start: 2022-09-21 | End: 2023-02-14

## 2022-09-21 NOTE — PROGRESS NOTES
Patient came in today to [facility] with Dr. Stack.  I notified them that Dr. Stack has a closed Pratice but he could see them for a ER follow up and they declined.  They wanted to cancel the appt.  9/21/22

## 2022-09-21 NOTE — TELEPHONE ENCOUNTER
"Last Written Prescription Date:  5/6/22  Last Fill Quantity: 30,  # refills: 3   Last office visit provider:  7/25/22     Requested Prescriptions   Pending Prescriptions Disp Refills     tamsulosin (FLOMAX) 0.4 MG capsule [Pharmacy Med Name: Tamsulosin HCl 0.4 MG Oral Capsule] 90 capsule 0     Sig: Take 1 capsule by mouth once daily       Alpha Blockers Passed - 9/21/2022  8:26 AM        Passed - Blood pressure under 140/90 in past 12 months     BP Readings from Last 3 Encounters:   08/31/22 102/66   08/29/22 122/82   07/25/22 108/78                 Passed - Recent (12 mo) or future (30 days) visit within the authorizing provider's specialty     Patient has had an office visit with the authorizing provider or a provider within the authorizing providers department within the previous 12 mos or has a future within next 30 days. See \"Patient Info\" tab in inbasket, or \"Choose Columns\" in Meds & Orders section of the refill encounter.              Passed - Patient does not have Tadalafil, Vardenafil, or Sildenafil on their medication list        Passed - Medication is active on med list        Passed - Patient is 18 years of age or older             Jj Dodge RN 09/21/22 8:26 AM  "

## 2022-10-01 ENCOUNTER — HEALTH MAINTENANCE LETTER (OUTPATIENT)
Age: 62
End: 2022-10-01

## 2022-10-27 ENCOUNTER — OFFICE VISIT (OUTPATIENT)
Dept: FAMILY MEDICINE | Facility: CLINIC | Age: 62
End: 2022-10-27
Payer: COMMERCIAL

## 2022-10-27 VITALS
HEIGHT: 67 IN | RESPIRATION RATE: 21 BRPM | BODY MASS INDEX: 24.01 KG/M2 | HEART RATE: 63 BPM | DIASTOLIC BLOOD PRESSURE: 60 MMHG | WEIGHT: 153 LBS | OXYGEN SATURATION: 98 % | SYSTOLIC BLOOD PRESSURE: 110 MMHG

## 2022-10-27 DIAGNOSIS — M17.12 PRIMARY OSTEOARTHRITIS OF LEFT KNEE: ICD-10-CM

## 2022-10-27 DIAGNOSIS — N40.0 BENIGN PROSTATIC HYPERPLASIA, UNSPECIFIED WHETHER LOWER URINARY TRACT SYMPTOMS PRESENT: ICD-10-CM

## 2022-10-27 DIAGNOSIS — Z23 ENCOUNTER FOR IMMUNIZATION: ICD-10-CM

## 2022-10-27 DIAGNOSIS — M1A.9XX0 CHRONIC GOUT WITHOUT TOPHUS, UNSPECIFIED CAUSE, UNSPECIFIED SITE: ICD-10-CM

## 2022-10-27 DIAGNOSIS — I77.1 HEPATIC ARTERY STENOSIS (H): Primary | ICD-10-CM

## 2022-10-27 DIAGNOSIS — K59.01 SLOW TRANSIT CONSTIPATION: ICD-10-CM

## 2022-10-27 DIAGNOSIS — E55.9 VITAMIN D DEFICIENCY: ICD-10-CM

## 2022-10-27 DIAGNOSIS — M17.11 PRIMARY OSTEOARTHRITIS OF RIGHT KNEE: ICD-10-CM

## 2022-10-27 DIAGNOSIS — Z11.4 SCREENING FOR HIV (HUMAN IMMUNODEFICIENCY VIRUS): ICD-10-CM

## 2022-10-27 DIAGNOSIS — E78.5 HYPERLIPIDEMIA LDL GOAL <100: ICD-10-CM

## 2022-10-27 DIAGNOSIS — K21.9 GASTROESOPHAGEAL REFLUX DISEASE, UNSPECIFIED WHETHER ESOPHAGITIS PRESENT: ICD-10-CM

## 2022-10-27 LAB
ALBUMIN SERPL BCG-MCNC: 4.4 G/DL (ref 3.5–5.2)
ALP SERPL-CCNC: 63 U/L (ref 40–129)
ALT SERPL W P-5'-P-CCNC: 22 U/L (ref 10–50)
ANION GAP SERPL CALCULATED.3IONS-SCNC: 8 MMOL/L (ref 7–15)
AST SERPL W P-5'-P-CCNC: 24 U/L (ref 10–50)
BILIRUB SERPL-MCNC: 0.3 MG/DL
BUN SERPL-MCNC: 11.6 MG/DL (ref 8–23)
CALCIUM SERPL-MCNC: 9.5 MG/DL (ref 8.6–10)
CHLORIDE SERPL-SCNC: 106 MMOL/L (ref 98–107)
CHOLEST SERPL-MCNC: 181 MG/DL
CREAT SERPL-MCNC: 1.17 MG/DL (ref 0.67–1.17)
DEPRECATED HCO3 PLAS-SCNC: 29 MMOL/L (ref 22–29)
ERYTHROCYTE [DISTWIDTH] IN BLOOD BY AUTOMATED COUNT: 13.7 % (ref 10–15)
GFR SERPL CREATININE-BSD FRML MDRD: 72 ML/MIN/1.73M2
GLUCOSE SERPL-MCNC: 82 MG/DL (ref 70–99)
HBA1C MFR BLD: 5.9 % (ref 0–5.6)
HCT VFR BLD AUTO: 37.7 % (ref 40–53)
HDLC SERPL-MCNC: 32 MG/DL
HGB BLD-MCNC: 12.9 G/DL (ref 13.3–17.7)
LDLC SERPL CALC-MCNC: 89 MG/DL
MCH RBC QN AUTO: 29.3 PG (ref 26.5–33)
MCHC RBC AUTO-ENTMCNC: 34.2 G/DL (ref 31.5–36.5)
MCV RBC AUTO: 86 FL (ref 78–100)
NONHDLC SERPL-MCNC: 149 MG/DL
PLATELET # BLD AUTO: 143 10E3/UL (ref 150–450)
POTASSIUM SERPL-SCNC: 4 MMOL/L (ref 3.4–5.3)
PROT SERPL-MCNC: 7.1 G/DL (ref 6.4–8.3)
RBC # BLD AUTO: 4.4 10E6/UL (ref 4.4–5.9)
SODIUM SERPL-SCNC: 143 MMOL/L (ref 136–145)
TRIGL SERPL-MCNC: 299 MG/DL
URATE SERPL-MCNC: 5.3 MG/DL (ref 3.4–7)
WBC # BLD AUTO: 4.5 10E3/UL (ref 4–11)

## 2022-10-27 PROCEDURE — 83036 HEMOGLOBIN GLYCOSYLATED A1C: CPT | Performed by: FAMILY MEDICINE

## 2022-10-27 PROCEDURE — 90471 IMMUNIZATION ADMIN: CPT | Performed by: FAMILY MEDICINE

## 2022-10-27 PROCEDURE — 80061 LIPID PANEL: CPT | Performed by: FAMILY MEDICINE

## 2022-10-27 PROCEDURE — 82306 VITAMIN D 25 HYDROXY: CPT | Performed by: FAMILY MEDICINE

## 2022-10-27 PROCEDURE — 99215 OFFICE O/P EST HI 40 MIN: CPT | Mod: 25 | Performed by: FAMILY MEDICINE

## 2022-10-27 PROCEDURE — 84550 ASSAY OF BLOOD/URIC ACID: CPT | Performed by: FAMILY MEDICINE

## 2022-10-27 PROCEDURE — 36415 COLL VENOUS BLD VENIPUNCTURE: CPT | Performed by: FAMILY MEDICINE

## 2022-10-27 PROCEDURE — 87389 HIV-1 AG W/HIV-1&-2 AB AG IA: CPT | Performed by: FAMILY MEDICINE

## 2022-10-27 PROCEDURE — 80053 COMPREHEN METABOLIC PANEL: CPT | Performed by: FAMILY MEDICINE

## 2022-10-27 PROCEDURE — 90682 RIV4 VACC RECOMBINANT DNA IM: CPT | Performed by: FAMILY MEDICINE

## 2022-10-27 PROCEDURE — 85027 COMPLETE CBC AUTOMATED: CPT | Performed by: FAMILY MEDICINE

## 2022-10-27 RX ORDER — PANTOPRAZOLE SODIUM 40 MG/1
40 TABLET, DELAYED RELEASE ORAL DAILY
Qty: 90 TABLET | Refills: 3 | Status: SHIPPED | OUTPATIENT
Start: 2022-10-27 | End: 2023-10-12

## 2022-10-27 RX ORDER — CHOLECALCIFEROL (VITAMIN D3) 50 MCG
1 TABLET ORAL DAILY
Qty: 90 TABLET | Refills: 3 | Status: SHIPPED | OUTPATIENT
Start: 2022-10-27

## 2022-10-27 RX ORDER — FAMOTIDINE 40 MG/1
40 TABLET, FILM COATED ORAL EVERY EVENING
Qty: 90 TABLET | Refills: 3 | Status: SHIPPED | OUTPATIENT
Start: 2022-10-27 | End: 2023-04-27

## 2022-10-27 ASSESSMENT — PAIN SCALES - GENERAL: PAINLEVEL: NO PAIN (0)

## 2022-10-27 NOTE — PROGRESS NOTES
Assessment/Plan:    Hepatic artery stenosis (H)  Establishment of cares.  Chart review suggesting hepatic artery stenosis history.  Further evaluation and determination of monitoring recommendations required.  Readdress at physical exam no later than 6 months.    Vitamin D deficiency  Vitamin D deficiency.  Utilizing vitamin D 3 taking 2000 units daily.  Ensure adequate vitamin D replacement.  - VITAMIN D3 50 MCG (2000 UT) tablet  Dispense: 90 tablet; Refill: 3  - Vitamin D Deficiency  - Vitamin D Deficiency    Gastroesophageal reflux disease, unspecified whether esophagitis present  Pantoprazole 40 mg daily continuing along with famotidine 40 mg at bedtime with benefits.  - famotidine (PEPCID) 40 MG tablet  Dispense: 90 tablet; Refill: 3  - pantoprazole (PROTONIX) 40 MG EC tablet  Dispense: 90 tablet; Refill: 3    Slow transit constipation  Ongoing constipation management continuing with use of Sharri-Colace and docusate sodium.    Primary osteoarthritis of left knee  Bilateral knee osteoarthritis described.  Has been followed by orthopedic specialist previously with prior injections.    Primary osteoarthritis of right knee  As above.    Benign prostatic hyperplasia, unspecified whether lower urinary tract symptoms present  Utilizes tamsulosin 0.4 mg daily.    Hyperlipidemia LDL goal <100  Check A1c today as well as lipid cascade, nonfasting  - Hemoglobin A1c  - Lipid panel reflex to direct LDL non-Fasting  - Hemoglobin A1c  - Lipid panel reflex to direct LDL non-Fasting    Chronic gout without tophus, unspecified cause, unspecified site  Colchicine 0.6 mg daily for gout prevention.  Check uric acid for goal less than 6 as well as ensure normal renal function as well as CBC today.  - Uric acid  - Comprehensive metabolic panel  - CBC with platelets  - Uric acid  - Comprehensive metabolic panel  - CBC with platelets    Screening for HIV (human immunodeficiency virus)  Routine HIV screen based on age criteria.  - HIV  "Antigen Antibody Combo  - HIV Antigen Antibody Combo    Encounter for immunization  Flublok immunization provided today.  - INFLUENZA QUAD, RECOMBINANT, P-FREE (RIV4) (FLUBLOK) AGE 50-64 [OTB780]    40 minutes total time spent on the date of encounter including patient chart review, counseling and coordination of cares, and documentation.         Subjective:    Nesha Newton is seen today for establishment of care.  Patient's daughter Malgorzata served as .  Bilateral knee pain.  Has had injections previously.  Colchicine 0.6 mg daily for gout prevention.  Pantoprazole 40 mg daily and famotidine 40 mg at bedtime for reflux management.  Gabapentin use 100 mg 3 times daily previously however apparently no longer utilizing.  Tamsulosin 0.4 mg daily for BPH management.  Vitamin D supplement 2000 units daily.  Continues use of Sharri-Colace for constipation management.  Sucralfate 1 g 4 times daily as well for reflux management.  Comprehensive review of systems as above otherwise all negative.  Past medical social family history reviewed and updated as noted below.  Needs seasonal flu shot.       - Lachi  3 daughters Malgorzata, Margarita, Michelle)  [facility] - [facility] out of [facility]...   Tobacco:  never  EtOH:  never  Mom - Clementine -  age 83 (2017)  Dad - Julio (101 years old)  3 kathleen - Deyvi  2 sis -   Surgeries:  left knee surgery (\"lacks fluid\" and still received injections)  Hospitalizations:  none  Work:  personal care assistant and  in [place]  Hobbies:  watching soccer always      No past surgical history on file.     Family History   Problem Relation Age of Onset     Diabetes Mother         Past Medical History:   Diagnosis Date     Chronic gastritis without bleeding, unspecified gastritis type 4/3/2018     Coccidioidomycosis, unspecified     Created by Conversion      GERD (gastroesophageal reflux disease)      Hepatic steatosis 2017    HCV neg,      Osteoarthrosis " "involving lower leg     Created by Conversion  Replacement Utility updated for latest IMO load     Polyarthralgia 4/5/2017     Vitamin D deficiency         Social History     Tobacco Use     Smoking status: Never     Smokeless tobacco: Never   Substance Use Topics     Alcohol use: No     Drug use: No        Current Outpatient Medications   Medication Sig Dispense Refill     aspirin (ASA) 81 MG EC tablet Take 1 tablet (81 mg) by mouth daily 90 tablet 1     colchicine (COLCYRS) 0.6 MG tablet Take 1 tablet (0.6 mg) by mouth daily 90 tablet 3     docusate sodium (EQ STOOL SOFTENER) 100 MG capsule TAKE 1 CAPSULE BY MOUTH TWICE DAILY AS NEEDED 60 capsule 11     famotidine (PEPCID) 40 MG tablet Take 1 tablet (40 mg) by mouth every evening 90 tablet 3     pantoprazole (PROTONIX) 40 MG EC tablet Take 1 tablet (40 mg) by mouth daily 90 tablet 3     polyethylene glycol (MIRALAX) 17 GM/Dose powder Take 17 g by mouth 2 times daily 765 g 5     SENNA-S 8.6-50 MG tablet TAKE 1 TABLET BY MOUTH TWICE DAILY AS NEEDED FOR  CONSTIPATION 30 tablet 11     sucralfate (CARAFATE) 1 GM tablet [SUCRALFATE (CARAFATE) 1 GRAM TABLET] 1 po four times a day before meals and at bedtime as needed for reflux 120 tablet 3     VITAMIN D3 50 MCG (2000 UT) tablet Take 1 tablet (50 mcg) by mouth daily 90 tablet 3     tamsulosin (FLOMAX) 0.4 MG capsule Take 1 capsule by mouth once daily (Patient not taking: Reported on 10/27/2022) 90 capsule 3          Objective:    Vitals:    10/27/22 1503   BP: 110/60   BP Location: Left arm   Patient Position: Sitting   Cuff Size: Adult Regular   Pulse: 63   Resp: 21   SpO2: 98%   Weight: 69.4 kg (153 lb)   Height: 1.689 m (5' 6.5\")      Body mass index is 24.32 kg/m .    Alert.  No apparent distress.  Chest clear.  Cardiac exam regular.  Abdomen benign.  Extremities warm and dry.      EXAM: CTA CHEST ABDOMEN PELVIS W CONTRAST  LOCATION: [facility]  DATE/TIME: 8/29/2022 6:54 PM     INDICATION: " Chest pain, with radiation between shoulders.  COMPARISON: 05/01/2022 CT abdomen pelvis.  TECHNIQUE: CT angiogram chest abdomen pelvis during arterial phase of injection of IV contrast. 2D and 3D MIP reconstructions were performed by the CT technologist. Dose reduction techniques were used.   CONTRAST: ISOVUE 370 100mL     FINDINGS:   CT ANGIOGRAM CHEST, ABDOMEN, AND PELVIS: Thoracic abdominal aorta negative for aneurysm or dissection. No pulmonary embolism. Celiac axis, Superior mesenteric arteries are patent. Solitary patent renal arteries without stenosis.     LUNGS AND PLEURA: Normal.     MEDIASTINUM/AXILLAE: Normal.     CORONARY ARTERY CALCIFICATION: None.     HEPATOBILIARY: Normal.     PANCREAS: Normal.     SPLEEN: Normal.     ADRENAL GLANDS: Normal.     KIDNEYS/BLADDER: Normal.     BOWEL: Normal appendix.      LYMPH NODES: Normal.     PELVIC ORGANS: Prostatic enlargement.     MUSCULOSKELETAL: Normal.                                                                      IMPRESSION:  1.  No pulmonary embolism.     2.  Thoracic and abdominal aorta negative for aneurysm or dissection.     3.  Normal appendix  .  4.  Prostatic enlargement.     5.  Constipation.      This note has been dictated using voice recognition software and as a result may contain minor grammatical errors and unintended word substitutions.

## 2022-10-28 LAB
DEPRECATED CALCIDIOL+CALCIFEROL SERPL-MC: 44 UG/L (ref 20–75)
HIV 1+2 AB+HIV1 P24 AG SERPL QL IA: NONREACTIVE

## 2022-11-04 ENCOUNTER — OFFICE VISIT (OUTPATIENT)
Dept: CARDIOLOGY | Facility: CLINIC | Age: 62
End: 2022-11-04
Attending: INTERNAL MEDICINE
Payer: COMMERCIAL

## 2022-11-04 VITALS
HEART RATE: 73 BPM | DIASTOLIC BLOOD PRESSURE: 72 MMHG | WEIGHT: 151 LBS | HEIGHT: 64 IN | BODY MASS INDEX: 25.78 KG/M2 | RESPIRATION RATE: 16 BRPM | SYSTOLIC BLOOD PRESSURE: 100 MMHG

## 2022-11-04 DIAGNOSIS — R07.9 CHEST PAIN, UNSPECIFIED TYPE: ICD-10-CM

## 2022-11-04 DIAGNOSIS — I30.0 ACUTE IDIOPATHIC PERICARDITIS: Primary | ICD-10-CM

## 2022-11-04 PROCEDURE — 99213 OFFICE O/P EST LOW 20 MIN: CPT | Performed by: INTERNAL MEDICINE

## 2022-11-04 NOTE — PROGRESS NOTES
"  HEART CARE ENCOUNTER CONSULTATON NOTE      [facility]  [phone]      Assessment/Recommendations   Assessment/Plan:  Pericarditis - stay on colchicine for 6 months before stopping. I asked that he call us if his symptoms return and we will increase the colchicine to BID and increase aspirin to TID    F/U 4-5 months       History of Present Illness/Subjective    HPI: Nesha Newton is a 61 year old male originally from [facility], who spent time in [facility], who has polyarthritis, GERD and a chronically abnormal EKG consistent with repolarization. I met him for prolonged left chest pain that radiated to his back.  It worsens with movement and when laying down. EKG was NSR with diffuse ST elevation, which is chronic. CTA chest showed no PE, dissection or coronary calcification. A nuclear stress test was negative and colchicine was started 8/2022 and he improved rapidly.    Nesha returns for follow up today, his daughter translates. He remains chest pain free on colchicine 0.6mg daily.  He is active and notes no dizziness, dyspnea or edema.       Physical Examination  Review of Systems   Vitals: /72 (BP Location: Left arm, Patient Position: Sitting, Cuff Size: Adult Regular)   Pulse 73   Resp 16   Ht 1.626 m (5' 4\")   Wt 68.5 kg (151 lb)   BMI 25.92 kg/m    BMI= Body mass index is 25.92 kg/m .  Wt Readings from Last 3 Encounters:   11/04/22 68.5 kg (151 lb)   10/27/22 69.4 kg (153 lb)   08/31/22 66.7 kg (147 lb)       General Appearance:   no distress, thin body habitus   ENT/Mouth: membranes moist, no oral lesions or bleeding gums.      EYES:  no scleral icterus, normal conjunctivae   Neck: no carotid bruits or thyromegaly   Chest/Lungs:   lungs are clear to auscultation, no rales or wheezing, no sternal scar, equal chest wall expansion    Cardiovascular:   Regular. Normal first and second heart sounds with no murmur. No rubs or gallops; the right carotid, radial and posterior tibial " pulses are intact and the left carotid, radial and posterior tibial pulses are intact.  Jugular venous pressure is flat, no edema bilaterally    Abdomen:  no organomegaly, masses, bruits, or tenderness; bowel sounds are present   Extremities: no cyanosis or clubbing   Skin: no xanthelasma, warm.    Neurologic: normal  bilateral, no tremors     Psychiatric: alert and oriented x3, calm        Please refer above for cardiac ROS details.        Medical History  Surgical History Family History Social History   Past Medical History:   Diagnosis Date     Chronic gastritis without bleeding, unspecified gastritis type 4/3/2018     Coccidioidomycosis, unspecified     Created by Conversion      GERD (gastroesophageal reflux disease)      Hepatic steatosis 4/24/2017    HCV neg,      Osteoarthrosis involving lower leg     Created by Conversion  Replacement Utility updated for latest IMO load     Polyarthralgia 4/5/2017     Vitamin D deficiency      No past surgical history on file.  Family History   Problem Relation Age of Onset     Diabetes Mother         Social History     Socioeconomic History     Marital status:      Spouse name: Not on file     Number of children: Not on file     Years of education: Not on file     Highest education level: Not on file   Occupational History     Not on file   Tobacco Use     Smoking status: Never     Smokeless tobacco: Never   Substance and Sexual Activity     Alcohol use: No     Drug use: No     Sexual activity: Not on file   Other Topics Concern     Not on file   Social History Narrative     Not on file     Social Determinants of Health     Financial Resource Strain: Not on file   Food Insecurity: Not on file   Transportation Needs: Not on file   Physical Activity: Not on file   Stress: Not on file   Social Connections: Not on file   Intimate Partner Violence: Not on file   Housing Stability: Not on file           Medications  Allergies   Current Outpatient Medications    Medication Sig Dispense Refill     aspirin (ASA) 81 MG EC tablet Take 1 tablet (81 mg) by mouth daily 90 tablet 1     colchicine (COLCYRS) 0.6 MG tablet Take 1 tablet (0.6 mg) by mouth daily 90 tablet 3     docusate sodium (EQ STOOL SOFTENER) 100 MG capsule TAKE 1 CAPSULE BY MOUTH TWICE DAILY AS NEEDED 60 capsule 11     famotidine (PEPCID) 40 MG tablet Take 1 tablet (40 mg) by mouth every evening 90 tablet 3     pantoprazole (PROTONIX) 40 MG EC tablet Take 1 tablet (40 mg) by mouth daily 90 tablet 3     polyethylene glycol (MIRALAX) 17 GM/Dose powder Take 17 g by mouth 2 times daily 765 g 5     SENNA-S 8.6-50 MG tablet TAKE 1 TABLET BY MOUTH TWICE DAILY AS NEEDED FOR  CONSTIPATION 30 tablet 11     sucralfate (CARAFATE) 1 GM tablet [SUCRALFATE (CARAFATE) 1 GRAM TABLET] 1 po four times a day before meals and at bedtime as needed for reflux 120 tablet 3     tamsulosin (FLOMAX) 0.4 MG capsule Take 1 capsule by mouth once daily 90 capsule 3     VITAMIN D3 50 MCG (2000 UT) tablet Take 1 tablet (50 mcg) by mouth daily 90 tablet 3       Allergies   Allergen Reactions     Tramadol Itching     Pt states itchy all over     Primidone Other (See Comments)     Dizziness and vomitting          Lab Results    Chemistry/lipid CBC Cardiac Enzymes/BNP/TSH/INR   Recent Labs   Lab Test 10/27/22  1637   CHOL 181   HDL 32*   LDL 89   TRIG 299*     Recent Labs   Lab Test 10/27/22  1637 05/03/21  0840 11/12/18  0840   LDL 89 116 81     Recent Labs   Lab Test 10/27/22  1637      POTASSIUM 4.0   CHLORIDE 106   CO2 29   GLC 82   BUN 11.6   CR 1.17   GFRESTIMATED 72   KELSEY 9.5     Recent Labs   Lab Test 10/27/22  1637 08/29/22  1759 05/01/22  0422   CR 1.17 1.03 1.03     Recent Labs   Lab Test 10/27/22  1637   A1C 5.9*          Recent Labs   Lab Test 10/27/22  1637   WBC 4.5   HGB 12.9*   HCT 37.7*   MCV 86   *     Recent Labs   Lab Test 10/27/22  1637 08/29/22  1759 05/01/22  0422   HGB 12.9* 13.4 12.6*    Recent Labs   Lab Test  08/29/22  2103 08/29/22  1759 09/04/21 2005   TROPONINI <0.01 0.01 <0.01     No results for input(s): BNP, NTBNPI, NTBNP in the last 57178 hours.  No results for input(s): TSH in the last 15936 hours.  No results for input(s): INR in the last 41740 hours.     Today's clinic visit entailed:  Review of prior external note(s) from - [facility] information from epic reviewed  40 minutes spent on the date of the encounter doing chart review, review of outside records, review of test results, interpretation of tests, patient visit and documentation   Provider  Link to [facility] Help Grid     The level of medical decision making during this visit was of moderate complexity.        Tahmina Haddad MD

## 2022-11-04 NOTE — LETTER
"11/4/2022    Aroldo Michaud MD  [address]  [facility] [address]    RE: Nesha Newton       Dear Colleague,     I had the pleasure of seeing Nesha Newton in the [facility].    HEART CARE ENCOUNTER CONSULTATON NOTE      [facility]  [phone]      Assessment/Recommendations   Assessment/Plan:  Pericarditis - stay on colchicine for 6 months before stopping. I asked that he call us if his symptoms return and we will increase the colchicine to BID and increase aspirin to TID    F/U 4-5 months       History of Present Illness/Subjective    HPI: Nesha Newton is a 61 year old male originally from [facility], who spent time in [facility], who has polyarthritis, GERD and a chronically abnormal EKG consistent with repolarization. I met him for prolonged left chest pain that radiated to his back.  It worsens with movement and when laying down. EKG was NSR with diffuse ST elevation, which is chronic. CTA chest showed no PE, dissection or coronary calcification. A nuclear stress test was negative and colchicine was started 8/2022 and he improved rapidly.    Nesha returns for follow up today, his daughter translates. He remains chest pain free on colchicine 0.6mg daily.  He is active and notes no dizziness, dyspnea or edema.       Physical Examination  Review of Systems   Vitals: /72 (BP Location: Left arm, Patient Position: Sitting, Cuff Size: Adult Regular)   Pulse 73   Resp 16   Ht 1.626 m (5' 4\")   Wt 68.5 kg (151 lb)   BMI 25.92 kg/m    BMI= Body mass index is 25.92 kg/m .  Wt Readings from Last 3 Encounters:   11/04/22 68.5 kg (151 lb)   10/27/22 69.4 kg (153 lb)   08/31/22 66.7 kg (147 lb)       General Appearance:   no distress, thin body habitus   ENT/Mouth: membranes moist, no oral lesions or bleeding gums.      EYES:  no scleral icterus, normal conjunctivae   Neck: no carotid bruits or thyromegaly   Chest/Lungs:   lungs are clear to auscultation, no " rales or wheezing, no sternal scar, equal chest wall expansion    Cardiovascular:   Regular. Normal first and second heart sounds with no murmur. No rubs or gallops; the right carotid, radial and posterior tibial pulses are intact and the left carotid, radial and posterior tibial pulses are intact.  Jugular venous pressure is flat, no edema bilaterally    Abdomen:  no organomegaly, masses, bruits, or tenderness; bowel sounds are present   Extremities: no cyanosis or clubbing   Skin: no xanthelasma, warm.    Neurologic: normal  bilateral, no tremors     Psychiatric: alert and oriented x3, calm        Please refer above for cardiac ROS details.        Medical History  Surgical History Family History Social History   Past Medical History:   Diagnosis Date     Chronic gastritis without bleeding, unspecified gastritis type 4/3/2018     Coccidioidomycosis, unspecified     Created by Conversion      GERD (gastroesophageal reflux disease)      Hepatic steatosis 4/24/2017    HCV neg,      Osteoarthrosis involving lower leg     Created by Conversion  Replacement Utility updated for latest IMO load     Polyarthralgia 4/5/2017     Vitamin D deficiency      No past surgical history on file.  Family History   Problem Relation Age of Onset     Diabetes Mother         Social History     Socioeconomic History     Marital status:      Spouse name: Not on file     Number of children: Not on file     Years of education: Not on file     Highest education level: Not on file   Occupational History     Not on file   Tobacco Use     Smoking status: Never     Smokeless tobacco: Never   Substance and Sexual Activity     Alcohol use: No     Drug use: No     Sexual activity: Not on file   Other Topics Concern     Not on file   Social History Narrative     Not on file     Social Determinants of Health     Financial Resource Strain: Not on file   Food Insecurity: Not on file   Transportation Needs: Not on file   Physical Activity: Not  on file   Stress: Not on file   Social Connections: Not on file   Intimate Partner Violence: Not on file   Housing Stability: Not on file           Medications  Allergies   Current Outpatient Medications   Medication Sig Dispense Refill     aspirin (ASA) 81 MG EC tablet Take 1 tablet (81 mg) by mouth daily 90 tablet 1     colchicine (COLCYRS) 0.6 MG tablet Take 1 tablet (0.6 mg) by mouth daily 90 tablet 3     docusate sodium (EQ STOOL SOFTENER) 100 MG capsule TAKE 1 CAPSULE BY MOUTH TWICE DAILY AS NEEDED 60 capsule 11     famotidine (PEPCID) 40 MG tablet Take 1 tablet (40 mg) by mouth every evening 90 tablet 3     pantoprazole (PROTONIX) 40 MG EC tablet Take 1 tablet (40 mg) by mouth daily 90 tablet 3     polyethylene glycol (MIRALAX) 17 GM/Dose powder Take 17 g by mouth 2 times daily 765 g 5     SENNA-S 8.6-50 MG tablet TAKE 1 TABLET BY MOUTH TWICE DAILY AS NEEDED FOR  CONSTIPATION 30 tablet 11     sucralfate (CARAFATE) 1 GM tablet [SUCRALFATE (CARAFATE) 1 GRAM TABLET] 1 po four times a day before meals and at bedtime as needed for reflux 120 tablet 3     tamsulosin (FLOMAX) 0.4 MG capsule Take 1 capsule by mouth once daily 90 capsule 3     VITAMIN D3 50 MCG (2000 UT) tablet Take 1 tablet (50 mcg) by mouth daily 90 tablet 3       Allergies   Allergen Reactions     Tramadol Itching     Pt states itchy all over     Primidone Other (See Comments)     Dizziness and vomitting          Lab Results    Chemistry/lipid CBC Cardiac Enzymes/BNP/TSH/INR   Recent Labs   Lab Test 10/27/22  1637   CHOL 181   HDL 32*   LDL 89   TRIG 299*     Recent Labs   Lab Test 10/27/22  1637 05/03/21  0840 11/12/18  0840   LDL 89 116 81     Recent Labs   Lab Test 10/27/22  1637      POTASSIUM 4.0   CHLORIDE 106   CO2 29   GLC 82   BUN 11.6   CR 1.17   GFRESTIMATED 72   KELSEY 9.5     Recent Labs   Lab Test 10/27/22  1637 08/29/22  1759 05/01/22  0422   CR 1.17 1.03 1.03     Recent Labs   Lab Test 10/27/22  1637   A1C 5.9*          Recent  Labs   Lab Test 10/27/22  1637   WBC 4.5   HGB 12.9*   HCT 37.7*   MCV 86   *     Recent Labs   Lab Test 10/27/22  1637 08/29/22  1759 05/01/22  0422   HGB 12.9* 13.4 12.6*    Recent Labs   Lab Test 08/29/22  2103 08/29/22  1759 09/04/21 2005   TROPONINI <0.01 0.01 <0.01     No results for input(s): BNP, NTBNPI, NTBNP in the last 44103 hours.  No results for input(s): TSH in the last 95670 hours.  No results for input(s): INR in the last 50885 hours.     Today's clinic visit entailed:  Review of prior external note(s) from [facility] information from epic reviewed  40 minutes spent on the date of the encounter doing chart review, review of outside records, review of test results, interpretation of tests, patient visit and documentation   Provider  Link to [facility] Help Grid     The level of medical decision making during this visit was of moderate complexity.        Tahmina Haddad MD              Thank you for allowing me to participate in the care of your patient.      Sincerely,     Tahmina Haddad MD     [facility]  cc:   Tahmina Haddad MD  [address]  [place] [address]

## 2022-11-04 NOTE — PATIENT INSTRUCTIONS
It was a pleasure seeing you at [facility] today.        Here are my suggestions for your care:    1. Stay on the colchicine daily, call if the pain comes back      Let's meet again in 5 months.    You can always call my nurse Corrie Carolina RN who is a nurse helping me in the care of my patients. She can be reached at [phone] if you have any questions.    For scheduling, please call my  Soledad Coulter at [phone].    Thank you again for trusting me with your care. Please feel free to call my office at any time if you have any question or if I can assist you in any way.    Tahmina Haddad MD  [facility]

## 2022-11-25 ENCOUNTER — TRANSFERRED RECORDS (OUTPATIENT)
Dept: HEALTH INFORMATION MANAGEMENT | Facility: CLINIC | Age: 62
End: 2022-11-25

## 2022-12-09 ENCOUNTER — TRANSFERRED RECORDS (OUTPATIENT)
Dept: HEALTH INFORMATION MANAGEMENT | Facility: CLINIC | Age: 62
End: 2022-12-09

## 2023-01-06 DIAGNOSIS — K59.00 CONSTIPATION, UNSPECIFIED CONSTIPATION TYPE: ICD-10-CM

## 2023-01-07 RX ORDER — DOCUSATE SODIUM, SENNOSIDES 50; 8.6 MG/1; MG/1
TABLET ORAL
Qty: 30 TABLET | Refills: 11 | Status: SHIPPED | OUTPATIENT
Start: 2023-01-07

## 2023-01-08 NOTE — TELEPHONE ENCOUNTER
"Last Written Prescription Date:  5/25/22  Last Fill Quantity: 30,  # refills:11   Last office visit provider:   10/27/22    Requested Prescriptions   Pending Prescriptions Disp Refills     EQ SENNA-S 8.6-50 MG tablet [Pharmacy Med Name: EQ Senna-S 8.6-50 MG Oral Tablet] 30 tablet 0     Sig: TAKE 1 TABLET BY MOUTH TWICE DAILY AS NEEDED FOR CONSTIPATION       Laxatives Protocol Passed - 1/6/2023  9:41 AM        Passed - Patient is age 6 or older        Passed - Recent (12 mo) or future (30 days) visit within the authorizing provider's specialty     Patient has had an office visit with the authorizing provider or a provider within the authorizing providers department within the previous 12 mos or has a future within next 30 days. See \"Patient Info\" tab in inbasket, or \"Choose Columns\" in Meds & Orders section of the refill encounter.              Passed - Medication is active on med list             Meseret Corley RN 01/07/23 6:46 PM  "

## 2023-01-23 ENCOUNTER — MEDICAL CORRESPONDENCE (OUTPATIENT)
Dept: HEALTH INFORMATION MANAGEMENT | Facility: CLINIC | Age: 63
End: 2023-01-23

## 2023-02-09 ENCOUNTER — TRANSCRIBE ORDERS (OUTPATIENT)
Dept: OTHER | Age: 63
End: 2023-02-09

## 2023-02-09 ENCOUNTER — HOSPITAL ENCOUNTER (EMERGENCY)
Facility: CLINIC | Age: 63
Discharge: HOME | End: 2023-02-09
Attending: EMERGENCY MEDICINE | Admitting: EMERGENCY MEDICINE
Payer: COMMERCIAL

## 2023-02-09 ENCOUNTER — APPOINTMENT (OUTPATIENT)
Dept: CT IMAGING | Facility: CLINIC | Age: 63
End: 2023-02-09
Attending: EMERGENCY MEDICINE
Payer: COMMERCIAL

## 2023-02-09 VITALS
WEIGHT: 141 LBS | HEIGHT: 67 IN | BODY MASS INDEX: 22.13 KG/M2 | DIASTOLIC BLOOD PRESSURE: 81 MMHG | HEART RATE: 62 BPM | TEMPERATURE: 98.4 F | SYSTOLIC BLOOD PRESSURE: 126 MMHG | OXYGEN SATURATION: 98 % | RESPIRATION RATE: 18 BRPM

## 2023-02-09 DIAGNOSIS — K64.4 EXTERNAL HEMORRHOIDS: ICD-10-CM

## 2023-02-09 DIAGNOSIS — K40.20 BILATERAL INGUINAL HERNIA WITHOUT OBSTRUCTION OR GANGRENE, RECURRENCE NOT SPECIFIED: Primary | ICD-10-CM

## 2023-02-09 DIAGNOSIS — R10.32 ABDOMINAL PAIN, LEFT LOWER QUADRANT: ICD-10-CM

## 2023-02-09 LAB
ALBUMIN SERPL-MCNC: 4.4 G/DL (ref 3.5–5)
ALBUMIN UR-MCNC: NEGATIVE MG/DL
ALP SERPL-CCNC: 62 U/L (ref 45–120)
ALT SERPL W P-5'-P-CCNC: 15 U/L (ref 0–45)
ANION GAP SERPL CALCULATED.3IONS-SCNC: 9 MMOL/L (ref 5–18)
APPEARANCE UR: CLEAR
AST SERPL W P-5'-P-CCNC: 19 U/L (ref 0–40)
BILIRUB DIRECT SERPL-MCNC: 0.1 MG/DL
BILIRUB SERPL-MCNC: 0.3 MG/DL (ref 0–1)
BILIRUB UR QL STRIP: NEGATIVE
BUN SERPL-MCNC: 9 MG/DL (ref 8–22)
CALCIUM SERPL-MCNC: 9.3 MG/DL (ref 8.5–10.5)
CHLORIDE BLD-SCNC: 105 MMOL/L (ref 98–107)
CO2 SERPL-SCNC: 25 MMOL/L (ref 22–31)
COLOR UR AUTO: COLORLESS
CREAT SERPL-MCNC: 1.11 MG/DL (ref 0.7–1.3)
ERYTHROCYTE [DISTWIDTH] IN BLOOD BY AUTOMATED COUNT: 13.2 % (ref 10–15)
GFR SERPL CREATININE-BSD FRML MDRD: 76 ML/MIN/1.73M2
GLUCOSE BLD-MCNC: 91 MG/DL (ref 70–125)
GLUCOSE UR STRIP-MCNC: NEGATIVE MG/DL
HCT VFR BLD AUTO: 42.7 % (ref 40–53)
HGB BLD-MCNC: 13.9 G/DL (ref 13.3–17.7)
HGB UR QL STRIP: NEGATIVE
KETONES UR STRIP-MCNC: NEGATIVE MG/DL
LEUKOCYTE ESTERASE UR QL STRIP: NEGATIVE
LIPASE SERPL-CCNC: 52 U/L (ref 0–52)
MCH RBC QN AUTO: 28.8 PG (ref 26.5–33)
MCHC RBC AUTO-ENTMCNC: 32.6 G/DL (ref 31.5–36.5)
MCV RBC AUTO: 88 FL (ref 78–100)
NITRATE UR QL: NEGATIVE
PH UR STRIP: 7 [PH] (ref 5–7)
PLATELET # BLD AUTO: 156 10E3/UL (ref 150–450)
POTASSIUM BLD-SCNC: 4.5 MMOL/L (ref 3.5–5)
PROT SERPL-MCNC: 7.6 G/DL (ref 6–8)
RBC # BLD AUTO: 4.83 10E6/UL (ref 4.4–5.9)
RBC URINE: <1 /HPF
SODIUM SERPL-SCNC: 139 MMOL/L (ref 136–145)
SP GR UR STRIP: 1.01 (ref 1–1.03)
UROBILINOGEN UR STRIP-MCNC: <2 MG/DL
WBC # BLD AUTO: 5.8 10E3/UL (ref 4–11)
WBC URINE: <1 /HPF

## 2023-02-09 PROCEDURE — 250N000011 HC RX IP 250 OP 636: Performed by: EMERGENCY MEDICINE

## 2023-02-09 PROCEDURE — 80053 COMPREHEN METABOLIC PANEL: CPT | Performed by: EMERGENCY MEDICINE

## 2023-02-09 PROCEDURE — 74177 CT ABD & PELVIS W/CONTRAST: CPT

## 2023-02-09 PROCEDURE — 99285 EMERGENCY DEPT VISIT HI MDM: CPT | Mod: 25

## 2023-02-09 PROCEDURE — 85027 COMPLETE CBC AUTOMATED: CPT | Performed by: PHYSICIAN ASSISTANT

## 2023-02-09 PROCEDURE — 36415 COLL VENOUS BLD VENIPUNCTURE: CPT | Performed by: PHYSICIAN ASSISTANT

## 2023-02-09 PROCEDURE — 82310 ASSAY OF CALCIUM: CPT | Performed by: PHYSICIAN ASSISTANT

## 2023-02-09 PROCEDURE — 81001 URINALYSIS AUTO W/SCOPE: CPT | Performed by: EMERGENCY MEDICINE

## 2023-02-09 PROCEDURE — 82248 BILIRUBIN DIRECT: CPT | Performed by: EMERGENCY MEDICINE

## 2023-02-09 PROCEDURE — 83690 ASSAY OF LIPASE: CPT | Performed by: EMERGENCY MEDICINE

## 2023-02-09 RX ORDER — IOPAMIDOL 755 MG/ML
90 INJECTION, SOLUTION INTRAVASCULAR ONCE
Status: COMPLETED | OUTPATIENT
Start: 2023-02-09 | End: 2023-02-09

## 2023-02-09 RX ADMIN — IOPAMIDOL 90 ML: 755 INJECTION, SOLUTION INTRAVENOUS at 19:33

## 2023-02-09 ASSESSMENT — ACTIVITIES OF DAILY LIVING (ADL)
ADLS_ACUITY_SCORE: 33
ADLS_ACUITY_SCORE: 33

## 2023-02-09 NOTE — ED TRIAGE NOTES
Pt here with left lower quadrant abdominal pain and blood in his urine. Started yesterday with pain and one hour ago bleeding started. Used language line to get refusal [id number] as daughter is interpreting for him, Malgorzata. Form signed. Pt started on Flomax in December.

## 2023-02-09 NOTE — ED PROVIDER NOTES
EMERGENCY DEPARTMENT ENCOUNTER      NAME: Nesha Newton  AGE: 62 year old male  YOB: 1960  MRN: [id number]  EVALUATION DATE & TIME: No admission date for patient encounter.    PCP: Aroldo Michaud    ED PROVIDER: Lanre Vital D.O.      Chief Complaint   Patient presents with     Abdominal Pain     Hematuria       FINAL IMPRESSION:  1. Abdominal pain, left lower quadrant    2. External hemorrhoids        ED COURSE & MEDICAL DECISION MAKING:    3:35 PM I met with the patient to gather history and to perform my initial exam. I discussed the plan for care while in the Emergency Department.  7:21 PM I performed rectal exam after patient was roomed.  8:12 PM I updated patient with results and plan for discharge.         Pertinent Labs & Imaging studies reviewed. (See chart for details)  62 year old male presents to the Emergency Department for evaluation of left lower quadrant pain and concern for blood on his toilet paper.  Patient had no truly bloody stools, or black-colored stools.  Initial concern was for hemorrhoid versus diverticulitis versus ischemic colitis versus volvulus versus mesenteric ischemia versus GI bleed.  On exam he did have some left lower quadrant tenderness, and a small hemorrhoid without gross blood or obvious bleeding.  CT imaging did not show any evidence of inflammation or ischemia that could easily explain his symptoms.  Patient's description of the blood that he has been noticing does not appear consistent with significant GI bleed, likely small bleeding hemorrhoid.  With CT imaging and exam, do not believe he requires surgical intervention or admission.  Believe that he can safely follow-up with his primary care provider.  Of note he did have a very large prostate, and it was noted by his daughter that he does have a follow-up appointment on February 20 to see urology for further evaluation of his prostate.  At this time I do not see indication for admission and believe he  can be safely discharged home.  Return precautions discussed.    Medical Decision Making    History:    Supplemental history from: Documented in chart, if applicable    External Record(s) reviewed: Documented in chart, if applicable.    Work Up:    Chart documentation includes differential considered and any EKGs or imaging independently interpreted by provider, where specified.    In additional to work up documented, I considered the following work up: Documented in chart, if applicable.    External consultation:    Discussion of management with another provider: Documented in chart, if applicable    Complicating factors:    Care impacted by chronic illness: N/A    Care affected by social determinants of health: N/A    Disposition considerations: Discharge. No recommendations on prescription strength medication(s). N/A.        At the conclusion of the encounter I discussed the results of all of the tests and the disposition. The questions were answered. The patient or family acknowledged understanding and was agreeable with the care plan.      HPI    Patient information was obtained from: Patient, family member    Use of : N/A       Nesha Newton is a 62 year old male with a pertinent history of hepatic artery stenosis who presents via walk-in for evaluation of abdominal pain, blood after bowel movements.    Per patient and daughter, patient currently endorses LLQ abdominal pain which began last night, as well as blood when wiping after bowel movements which he first noticed around 2 PM. Patient denies any blood in the toilet after bowel movements. He also notes a sensation of fullness with bowel movements. Patient notes that he has a known hemorrhoid. No history of abdominal surgeries. Patient denies tobacco and alcohol use.     Patient denies nausea, vomiting, diarrhea, chest pain, shortness of breath, light headedness, and all other complaints at this time.      REVIEW OF SYSTEMS  Constitutional:   Denies fever, chills, weight loss or weakness  Eyes:  No pain, discharge, redness  HENT:  Denies sore throat, ear pain, congestion  Respiratory: No SOB, wheeze or cough  Cardiovascular:  No CP, palpitations  GI:  Denies nausea, vomiting, diarrhea. Positive for LLQ abdominal pain, blood with wiping  : Denies dysuria, hematuria  Musculoskeletal:  Denies any new muscle/joint pain, swelling or loss of function.  Skin:  Denies rash, pallor  Neurologic:  Denies headache, focal weakness or sensory changes  Lymph: Denies swollen nodes    All other systems negative unless noted in HPI.    PAST MEDICAL HISTORY:  Past Medical History:   Diagnosis Date     Chronic gastritis without bleeding, unspecified gastritis type 4/3/2018     Coccidioidomycosis, unspecified     Created by Conversion      GERD (gastroesophageal reflux disease)      Hepatic steatosis 4/24/2017    HCV neg,      Osteoarthrosis involving lower leg     Created by Conversion  Replacement Utility updated for latest IMO load     Polyarthralgia 4/5/2017     Vitamin D deficiency        PAST SURGICAL HISTORY:  History reviewed. No pertinent surgical history.      CURRENT MEDICATIONS:    No current facility-administered medications for this encounter.     Current Outpatient Medications   Medication     aspirin (ASA) 81 MG EC tablet     colchicine (COLCYRS) 0.6 MG tablet     docusate sodium (EQ STOOL SOFTENER) 100 MG capsule     EQ SENNA-S 8.6-50 MG tablet     famotidine (PEPCID) 40 MG tablet     pantoprazole (PROTONIX) 40 MG EC tablet     polyethylene glycol (MIRALAX) 17 GM/Dose powder     sucralfate (CARAFATE) 1 GM tablet     tamsulosin (FLOMAX) 0.4 MG capsule     VITAMIN D3 50 MCG (2000 UT) tablet         ALLERGIES:  Allergies   Allergen Reactions     Tramadol Itching     Pt states itchy all over     Primidone Other (See Comments)     Dizziness and vomitting       FAMILY HISTORY:  Family History   Problem Relation Age of Onset     Diabetes Mother        SOCIAL  "HISTORY:  Social History     Socioeconomic History     Marital status:    Tobacco Use     Smoking status: Never     Smokeless tobacco: Never   Substance and Sexual Activity     Alcohol use: No     Drug use: No       VITALS:  Patient Vitals for the past 24 hrs:   BP Temp Temp src Pulse Resp SpO2 Height Weight   02/09/23 2030 -- -- -- 62 -- 98 % -- --   02/09/23 2000 -- -- -- 68 -- 98 % -- --   02/09/23 1915 126/81 98.4  F (36.9  C) Oral 66 -- 98 % -- --   02/09/23 1443 130/80 99  F (37.2  C) Temporal 81 18 97 % 1.702 m (5' 7\") 64 kg (141 lb)       PHYSICAL EXAM    VITAL SIGNS: /81   Pulse 62   Temp 98.4  F (36.9  C) (Oral)   Resp 18   Ht 1.702 m (5' 7\")   Wt 64 kg (141 lb)   SpO2 98%   BMI 22.08 kg/m      General Appearance: Well-appearing, well-nourished, no acute distress   Head:  Normocephalic, without obvious abnormality, atraumatic  Eyes:  PERRL, conjunctiva/corneas clear, EOM's intact,  ENT:  Lips, mucosa, and tongue normal, membranes are moist without pallor  Neck:  Normal ROM, symmetrical, trachea midline    Abdomen:  Soft, no rebound or guarding. LLQ tenderness.  Rectal: Small non bleeding hemorrhoid, no gross blood or black stool on exam  Musculoskeletal: Full ROM, no edema, no cyanosis, good ROM of major joints  Integument:  Warm, Dry, No erythema, No rash.    Neurologic:  Alert & oriented.  No focal deficits appreciated.  Ambulatory.  Psychiatric:  Affect normal, Judgment normal, Mood normal.      LABS  Results for orders placed or performed during the hospital encounter of 02/09/23 (from the past 24 hour(s))   UA with Microscopic reflex to Culture    Specimen: Urine, Clean Catch   Result Value Ref Range    Color Urine Colorless Colorless, Straw, Light Yellow, Yellow    Appearance Urine Clear Clear    Glucose Urine Negative Negative mg/dL    Bilirubin Urine Negative Negative    Ketones Urine Negative Negative mg/dL    Specific Gravity Urine 1.006 1.001 - 1.030    Blood Urine Negative " Negative    pH Urine 7.0 5.0 - 7.0    Protein Albumin Urine Negative Negative mg/dL    Urobilinogen Urine <2.0 <2.0 mg/dL    Nitrite Urine Negative Negative    Leukocyte Esterase Urine Negative Negative    RBC Urine <1 <=2 /HPF    WBC Urine <1 <=5 /HPF    Narrative    Urine Culture not indicated   CBC with platelets   Result Value Ref Range    WBC Count 5.8 4.0 - 11.0 10e3/uL    RBC Count 4.83 4.40 - 5.90 10e6/uL    Hemoglobin 13.9 13.3 - 17.7 g/dL    Hematocrit 42.7 40.0 - 53.0 %    MCV 88 78 - 100 fL    MCH 28.8 26.5 - 33.0 pg    MCHC 32.6 31.5 - 36.5 g/dL    RDW 13.2 10.0 - 15.0 %    Platelet Count 156 150 - 450 10e3/uL   Basic metabolic panel   Result Value Ref Range    Sodium 139 136 - 145 mmol/L    Potassium 4.5 3.5 - 5.0 mmol/L    Chloride 105 98 - 107 mmol/L    Carbon Dioxide (CO2) 25 22 - 31 mmol/L    Anion Gap 9 5 - 18 mmol/L    Urea Nitrogen 9 8 - 22 mg/dL    Creatinine 1.11 0.70 - 1.30 mg/dL    Calcium 9.3 8.5 - 10.5 mg/dL    Glucose 91 70 - 125 mg/dL    GFR Estimate 76 >60 mL/min/1.73m2   Hepatic function panel   Result Value Ref Range    Bilirubin Total 0.3 0.0 - 1.0 mg/dL    Bilirubin Direct 0.1 <=0.5 mg/dL    Protein Total 7.6 6.0 - 8.0 g/dL    Albumin 4.4 3.5 - 5.0 g/dL    Alkaline Phosphatase 62 45 - 120 U/L    AST 19 0 - 40 U/L    ALT 15 0 - 45 U/L   Lipase   Result Value Ref Range    Lipase 52 0 - 52 U/L   CT Abdomen Pelvis w Contrast    Narrative    EXAM: CT ABDOMEN PELVIS W CONTRAST  LOCATION: [facility]  DATE/TIME: 2/9/2023 7:37 PM    INDICATION: LLQ abdominal pain. Hematuria.  COMPARISON: 08/29/2022.  TECHNIQUE: CT scan of the abdomen and pelvis was performed following injection of IV contrast. Multiplanar reformats were obtained. Dose reduction techniques were used.  CONTRAST: 90 mL Isovue-370.    FINDINGS:   LOWER CHEST: Normal.    HEPATOBILIARY: Stable incidental 6 x 10 mm right hepatic subcapsular hypodensity / cyst. Otherwise, unremarkable.    PANCREAS:  Normal.    SPLEEN: Normal.    ADRENAL GLANDS: Normal.    KIDNEYS/BLADDER: Mildly distended urinary bladder. Otherwise unremarkable.    BOWEL: Unremarkable bowel and appendix. No obstruction or diverticulitis.    LYMPH NODES: No adenopathy.    VASCULATURE: Nonaneurysmal aorta.    PELVIC ORGANS: Moderate prostatomegaly.    MUSCULOSKELETAL: Degenerative changes spine.    OTHER: No free fluid or air. No abscess.      Impression    IMPRESSION:     1.  Mildly distended urinary bladder. Moderate prostatomegaly. Correlate for bladder outlet obstruction.    2.  Some areas of bowel are decompressed, though no obvious acute inflammation or obstruction. No diverticulitis or appendicitis.             RADIOLOGY  CT Abdomen Pelvis w Contrast   Final Result   IMPRESSION:       1.  Mildly distended urinary bladder. Moderate prostatomegaly. Correlate for bladder outlet obstruction.      2.  Some areas of bowel are decompressed, though no obvious acute inflammation or obstruction. No diverticulitis or appendicitis.                 MEDICATIONS GIVEN IN THE EMERGENCY:  Medications   iopamidol (ISOVUE-370) solution 90 mL (90 mLs Intravenous Given 2/9/23 1933)       NEW PRESCRIPTIONS STARTED AT TODAY'S ER VISIT  New Prescriptions    No medications on file        I, Vivek Sol, am serving as a scribe to document services personally performed by Lanre Vital D.O., based on my observations and the provider's statements to me.  I, Lanre Vital D.O., attest that Vivek Sol is acting in a scribe capacity, has observed my performance of the services and has documented them in accordance with my direction.     Lanre Vital D.O.  Emergency Medicine  [facility] EMERGENCY ROOM  [address] [facility] [id number] [phone]  Dept: [phone]      Lanre Vital DO  02/09/23 5881

## 2023-02-10 NOTE — ED NOTES
Patient spoke with Provider. Stable at this time, no signs of distress. IV removed; catheter tip intact. Received discharge teaching with assistance of daughter, and daughter signed paperwork. Verbalized full understanding. Ambulated out of the unit with all belongings.

## 2023-02-10 NOTE — DISCHARGE INSTRUCTIONS
You can use over the counter Preparation H as needed for treatment of the hemorrhoids.  Be sure to follow-up with your primary care provider in the next 3-5 business days.  Make sure to keep your appointment with your urologist on the 20th of February.

## 2023-02-13 PROBLEM — K44.9 DIAPHRAGMATIC HERNIA: Status: ACTIVE | Noted: 2022-11-25

## 2023-02-13 PROBLEM — R12 HEARTBURN: Status: ACTIVE | Noted: 2022-11-29

## 2023-02-13 PROBLEM — R10.32 LEFT LOWER QUADRANT PAIN: Status: ACTIVE | Noted: 2023-02-13

## 2023-02-13 PROBLEM — Z86.0100 HISTORY OF COLONIC POLYPS: Status: ACTIVE | Noted: 2021-07-12

## 2023-02-13 PROBLEM — K92.1 HEMATOCHEZIA: Status: ACTIVE | Noted: 2023-02-13

## 2023-02-14 ENCOUNTER — OFFICE VISIT (OUTPATIENT)
Dept: FAMILY MEDICINE | Facility: CLINIC | Age: 63
End: 2023-02-14
Payer: COMMERCIAL

## 2023-02-14 VITALS
OXYGEN SATURATION: 99 % | DIASTOLIC BLOOD PRESSURE: 70 MMHG | SYSTOLIC BLOOD PRESSURE: 110 MMHG | TEMPERATURE: 98 F | HEART RATE: 72 BPM | HEIGHT: 65 IN | BODY MASS INDEX: 25.5 KG/M2 | WEIGHT: 153.06 LBS

## 2023-02-14 DIAGNOSIS — N40.0 BENIGN PROSTATIC HYPERPLASIA, UNSPECIFIED WHETHER LOWER URINARY TRACT SYMPTOMS PRESENT: ICD-10-CM

## 2023-02-14 DIAGNOSIS — K64.8 INTERNAL HEMORRHOIDS: Primary | ICD-10-CM

## 2023-02-14 PROCEDURE — 99214 OFFICE O/P EST MOD 30 MIN: CPT | Performed by: STUDENT IN AN ORGANIZED HEALTH CARE EDUCATION/TRAINING PROGRAM

## 2023-02-14 RX ORDER — TAMSULOSIN HYDROCHLORIDE 0.4 MG/1
0.8 CAPSULE ORAL DAILY
COMMUNITY
Start: 2023-02-14

## 2023-02-14 ASSESSMENT — PAIN SCALES - GENERAL: PAINLEVEL: NO PAIN (0)

## 2023-02-14 NOTE — PROGRESS NOTES
ER Follow-up Visit      Assessment and Plan     62-year-old male with relevant past medical history of BPH and internal hemorrhoids who presents for these issues after going to the ER with abdominal pain and intermittent rectal bleeding.  Felt to be a combination of BPH with retention given CT showing this as well as some hemorrhoids causing intermittent bleeding.  CT scan was not concerning for things like diverticulitis or colitis.  I think most of his issue now is coming from bladder distention and BPH.  I recommended he increase his Flomax from 0.4 mg daily to 0.8 mg daily consistently.  I do not think he needs a catheter today as he is not having significant pain over suprapubic region.  Does have some tenderness there though.  He is seeing urology in 6 days and may need a catheter for a time.  May need a TURP but will let urology make this determination.  For now for his hemorrhoids he will continue to use senna twice daily as well as fiber daily.  Continue to drink large amounts of water.  If hemorrhoids worsen in the future after dealing with prostate issues or would like to discuss hemorrhoids in more detail would recommend follow-up with colorectal again and will place referral.    1. Benign prostatic hyperplasia, unspecified whether lower urinary tract symptoms present    2. Internal hemorrhoids    Options for treatment and follow-up care were reviewed with the patient engaged in the decision making process and verbalized understanding of the options discussed and agreed with the final plan.      Liam Soria MD           [facility]       ER Follow-up Visit:    ER: Carrie    Date of Visit: 2/9/23    Reason(s) for Presentation: Abdominal pain    Diagnostic Tests:     CT abdomen:  IMPRESSION:   1.  Mildly distended urinary bladder. Moderate prostatomegaly. Correlate for bladder outlet obstruction.  2.  Some areas of bowel are decompressed, though no obvious acute inflammation or obstruction. No  diverticulitis or appendicitis.    Summary of ER visit copied below/Treatments Recieved:   Pertinent Labs & Imaging studies reviewed. (See chart for details)  62 year old male presents to the Emergency Department for evaluation of left lower quadrant pain and concern for blood on his toilet paper.  Patient had no truly bloody stools, or black-colored stools.  Initial concern was for hemorrhoid versus diverticulitis versus ischemic colitis versus volvulus versus mesenteric ischemia versus GI bleed.  On exam he did have some left lower quadrant tenderness, and a small hemorrhoid without gross blood or obvious bleeding.  CT imaging did not show any evidence of inflammation or ischemia that could easily explain his symptoms.  Patient's description of the blood that he has been noticing does not appear consistent with significant GI bleed, likely small bleeding hemorrhoid.  With CT imaging and exam, do not believe he requires surgical intervention or admission.  Believe that he can safely follow-up with his primary care provider.  Of note he did have a very large prostate, and it was noted by his daughter that he does have a follow-up appointment on February 20 to see urology for further evaluation of his prostate.  At this time I do not see indication for admission and believe he can be safely discharged home.  Return precautions discussed.    Concerns today:     Chief Complaint   Patient presents with     hospital follow up      Stool bleeding,      Prostate Problem     Hemorrhoids      Daughter interpreted for patient.  Patient tells me he is feeling better since the ER.  He is not having pain today in his left lower quadrant where he did previously.  He still having occasional blood on toilet paper with wiping.  Really does not have rectal or anal pain though.  Review of the chart he has seen colorectal previously in 2017 and was noted to have internal hemorrhoids.  He was recommended to follow high-fiber high water  "intake diet.  Asking now he is on MiraLAX 17 g twice daily as well as senna twice daily as needed but he has been using twice daily consistently since hemorrhoids have been flaring.  His stools are much softer due to this where they had been hard and constipated previously.  In the ER he was noted to have a mildly extended bladder enlarged prostate.  A catheter was not placed.  He tells me he is urinating frequently every 20 to 30 minutes but only once or twice a night.  He does drink significant water.  He is on Flomax 0.4 mg daily.  Occasionally he will take it twice a day.  Also reviewed his last colonoscopy which was in July 2021.  Normal mostly but with internal hemorrhoids noted and recommended repeat colonoscopy in 10 years.                Review of Systems:     Complete review of systems is negative except as noted in the HPI            Physical Exam:   /70   Pulse 72   Temp 98  F (36.7  C)   Ht 1.638 m (5' 4.5\")   Wt 69.4 kg (153 lb 1 oz)   SpO2 99%   BMI 25.87 kg/m      General appearance: Alert, cooperative, no distress, appears stated age  Head: Normocephalic, atraumatic, without obvious abnormality  Eyes: Pupils equal round, reactive.  Conjunctiva clear.  Nose: Nares normal, no drainage.  Throat: Lips, mucosa, tongue normal mucosa pink and moist  Neck: Supple, symmetric, trachea midline  Abdomen: Soft, tender over suprapubic region, nondistended.  Bowel sounds active in all 4 quadrants.  No masses or organomegaly.  Extremities: Extremities normal, atraumatic.  No cyanosis or edema.  Skin: Skin color, texture, turgor normal no rashes or lesions on limited skin exam  Neurologic: CN II through XII intact, normal strength.      "

## 2023-02-20 ENCOUNTER — OFFICE VISIT (OUTPATIENT)
Dept: SURGERY | Facility: CLINIC | Age: 63
End: 2023-02-20
Attending: UROLOGY
Payer: COMMERCIAL

## 2023-02-20 VITALS
WEIGHT: 152 LBS | DIASTOLIC BLOOD PRESSURE: 80 MMHG | SYSTOLIC BLOOD PRESSURE: 120 MMHG | BODY MASS INDEX: 23.86 KG/M2 | HEIGHT: 67 IN

## 2023-02-20 DIAGNOSIS — K40.20 BILATERAL INGUINAL HERNIA WITHOUT OBSTRUCTION OR GANGRENE, RECURRENCE NOT SPECIFIED: ICD-10-CM

## 2023-02-20 PROCEDURE — 99203 OFFICE O/P NEW LOW 30 MIN: CPT | Performed by: SURGERY

## 2023-02-20 NOTE — LETTER
2/20/2023         RE: Nesha Newton  [address] [facility] [address]        Dear Colleague,    Thank you for referring your patient, Nesha Newton, to the [facility] [place]. Please see a copy of my visit note below.    HPI:  Nesha Newton is a 62 year old male who was referred to me by Aroldo Michaud for an inguinal hernia. He  presents today with complaints of intermittent constipation with left-sided lower abdominal discomfort and scant bleeding when he passes stool..  He is being followed by urologist for enlarged prostate.  He has had an inguinal hernia repair approximately 18 years ago per his reports in [facility] beds at the [facility] and states that he thinks it was on the left side.  He denies any left or right inguinal bulges.  His main complaint again was mild rectal bleeding with bowel movements.  He was actually seen in the emergency room recently and underwent CT imaging which was relatively unremarkable.    Allergies:Tramadol and Primidone    Past Medical History:   Diagnosis Date     Chronic gastritis without bleeding, unspecified gastritis type 4/3/2018     Coccidioidomycosis, unspecified     Created by Conversion      GERD (gastroesophageal reflux disease)      Hepatic steatosis 4/24/2017    HCV neg,      Osteoarthrosis involving lower leg     Created by Conversion  Replacement Utility updated for latest IMO load     Polyarthralgia 4/5/2017     Vitamin D deficiency        History reviewed. No pertinent surgical history.    CURRENT MEDS:  Current Outpatient Medications   Medication Sig Dispense Refill     aspirin (ASA) 81 MG EC tablet Take 1 tablet (81 mg) by mouth daily 90 tablet 1     colchicine (COLCYRS) 0.6 MG tablet Take 1 tablet (0.6 mg) by mouth daily 90 tablet 3     docusate sodium (EQ STOOL SOFTENER) 100 MG capsule TAKE 1 CAPSULE BY MOUTH TWICE DAILY AS NEEDED 60 capsule 11     EQ SENNA-S 8.6-50 MG tablet TAKE 1 TABLET BY MOUTH TWICE DAILY AS NEEDED  "FOR CONSTIPATION 30 tablet 11     famotidine (PEPCID) 40 MG tablet Take 1 tablet (40 mg) by mouth every evening 90 tablet 3     pantoprazole (PROTONIX) 40 MG EC tablet Take 1 tablet (40 mg) by mouth daily 90 tablet 3     polyethylene glycol (MIRALAX) 17 GM/Dose powder Take 17 g by mouth 2 times daily 765 g 5     sucralfate (CARAFATE) 1 GM tablet [SUCRALFATE (CARAFATE) 1 GRAM TABLET] 1 po four times a day before meals and at bedtime as needed for reflux 120 tablet 3     tamsulosin (FLOMAX) 0.4 MG capsule Take 2 capsules (0.8 mg) by mouth daily       VITAMIN D3 50 MCG (2000 UT) tablet Take 1 tablet (50 mcg) by mouth daily 90 tablet 3       Family history-reviewed and  non-contributory     reports that he has never smoked. He has never used smokeless tobacco. He reports that he does not drink alcohol and does not use drugs.    Review of Systems -   The 12 system review is within normal limits except for as mentioned above.  General ROS: No complaints or constitutional symptoms  Ophthalmic ROS: No complaints of visual changes  Skin: No complaints or symptoms   Endocrine: No complaints or symptoms  Hematologic/Lymphatic: No symptoms or complaints  Psychiatric: No symptoms or complaints  Respiratory ROS: no cough, shortness of breath, or wheezing  Cardiovascular ROS: no chest pain or dyspnea on exertion  Gastrointestinal ROS: As per HPI  Genito-Urinary ROS: no dysuria, trouble voiding, or hematuria  Musculoskeletal ROS: no joint or muscle pain  Neurological ROS: no TIA or stroke symptoms    /80   Ht 1.702 m (5' 7\")   Wt 68.9 kg (152 lb)   BMI 23.81 kg/m    Body mass index is 23.81 kg/m .    EXAM:  GENERAL: Well developed male, No acute distress, pleasant and conversant   EYES: Pupils equal, round and reactive, no scleral icterus  ABDOMEN: Soft, nontender, no evidence of a left or right inguinal hernia no obvious masses noted  SKIN: Pink, warm and dry, no obvious rashes or lesions   NEURO:No focal deficits, " ambulatory  MUSCULOSKELETAL:No obvious deformities, no swelling, normal appearing          IMAGES:   Relevant images were reviewed and discussed with the patient.  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  He is currently undergoing treatment for his enlarged prostate.  I do not feel or appreciate any obvious hernias.  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  I recommended that he continue to take his stool softeners and follow-up with his urologist.  He may also require colonoscopy if he has not received 1 recently..  Otherwise he may follow-up with me on a as needed basis.      Dk Iqbal D.O., FACS  [phone]  [facility] Department of Surgery      Again, thank you for allowing me to participate in the care of your patient.        Sincerely,        Scott Iqbal, DO

## 2023-02-20 NOTE — PROGRESS NOTES
HPI:  Nesha Newton is a 62 year old male who was referred to me by Aroldo Michaud for an inguinal hernia. He  presents today with complaints of intermittent constipation with left-sided lower abdominal discomfort and scant bleeding when he passes stool..  He is being followed by urologist for enlarged prostate.  He has had an inguinal hernia repair approximately 18 years ago per his reports in [facility] and states that he thinks it was on the left side.  He denies any left or right inguinal bulges.  His main complaint again was mild rectal bleeding with bowel movements.  He was actually seen in the emergency room recently and underwent CT imaging which was relatively unremarkable.    Allergies:Tramadol and Primidone    Past Medical History:   Diagnosis Date     Chronic gastritis without bleeding, unspecified gastritis type 4/3/2018     Coccidioidomycosis, unspecified     Created by Conversion      GERD (gastroesophageal reflux disease)      Hepatic steatosis 4/24/2017    HCV neg,      Osteoarthrosis involving lower leg     Created by Conversion  Replacement Utility updated for latest IMO load     Polyarthralgia 4/5/2017     Vitamin D deficiency        History reviewed. No pertinent surgical history.    CURRENT MEDS:  Current Outpatient Medications   Medication Sig Dispense Refill     aspirin (ASA) 81 MG EC tablet Take 1 tablet (81 mg) by mouth daily 90 tablet 1     colchicine (COLCYRS) 0.6 MG tablet Take 1 tablet (0.6 mg) by mouth daily 90 tablet 3     docusate sodium (EQ STOOL SOFTENER) 100 MG capsule TAKE 1 CAPSULE BY MOUTH TWICE DAILY AS NEEDED 60 capsule 11     EQ SENNA-S 8.6-50 MG tablet TAKE 1 TABLET BY MOUTH TWICE DAILY AS NEEDED FOR CONSTIPATION 30 tablet 11     famotidine (PEPCID) 40 MG tablet Take 1 tablet (40 mg) by mouth every evening 90 tablet 3     pantoprazole (PROTONIX) 40 MG EC tablet Take 1 tablet (40 mg) by mouth daily 90 tablet 3     polyethylene glycol (MIRALAX) 17 GM/Dose powder Take 17 g by  "mouth 2 times daily 765 g 5     sucralfate (CARAFATE) 1 GM tablet [SUCRALFATE (CARAFATE) 1 GRAM TABLET] 1 po four times a day before meals and at bedtime as needed for reflux 120 tablet 3     tamsulosin (FLOMAX) 0.4 MG capsule Take 2 capsules (0.8 mg) by mouth daily       VITAMIN D3 50 MCG (2000 UT) tablet Take 1 tablet (50 mcg) by mouth daily 90 tablet 3       Family history-reviewed and  non-contributory     reports that he has never smoked. He has never used smokeless tobacco. He reports that he does not drink alcohol and does not use drugs.    Review of Systems -   The 12 system review is within normal limits except for as mentioned above.  General ROS: No complaints or constitutional symptoms  Ophthalmic ROS: No complaints of visual changes  Skin: No complaints or symptoms   Endocrine: No complaints or symptoms  Hematologic/Lymphatic: No symptoms or complaints  Psychiatric: No symptoms or complaints  Respiratory ROS: no cough, shortness of breath, or wheezing  Cardiovascular ROS: no chest pain or dyspnea on exertion  Gastrointestinal ROS: As per HPI  Genito-Urinary ROS: no dysuria, trouble voiding, or hematuria  Musculoskeletal ROS: no joint or muscle pain  Neurological ROS: no TIA or stroke symptoms    /80   Ht 1.702 m (5' 7\")   Wt 68.9 kg (152 lb)   BMI 23.81 kg/m    Body mass index is 23.81 kg/m .    EXAM:  GENERAL: Well developed male, No acute distress, pleasant and conversant   EYES: Pupils equal, round and reactive, no scleral icterus  ABDOMEN: Soft, nontender, no evidence of a left or right inguinal hernia no obvious masses noted  SKIN: Pink, warm and dry, no obvious rashes or lesions   NEURO:No focal deficits, ambulatory  MUSCULOSKELETAL:No obvious deformities, no swelling, normal appearing          IMAGES:   Relevant images were reviewed and discussed with the patient.  Notable findings were CT images reviewed and demonstrate a very small spermatic cord lipoma with no obvious " hernias      Assessment/Plan:   Nesha Newton is a 62 year old male with an enlarged prostate and intermittent constipation with subsequent intermittent bleeding per rectum.  He is currently undergoing treatment for his enlarged prostate.  I do not feel or appreciate any obvious hernias.  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  I recommended that he continue to take his stool softeners and follow-up with his urologist.  He may also require colonoscopy if he has not received 1 recently..  Otherwise he may follow-up with me on a as needed basis.      Dk Iqbal D.O., FACS  [phone]  [facility] Department of Surgery

## 2023-02-24 ENCOUNTER — TELEPHONE (OUTPATIENT)
Dept: SURGERY | Facility: CLINIC | Age: 63
End: 2023-02-24
Payer: COMMERCIAL

## 2023-02-24 NOTE — TELEPHONE ENCOUNTER
Daughter calling dad seen Dr. Iqbal Monday and said he told him to see Urology.  They made an appt with urology on 3/24 but Urology is telling them to see Dr. Iqbal.  They are confused as to what they should be doing.    Please call and advise.    Thanks!

## 2023-02-24 NOTE — TELEPHONE ENCOUNTER
I spoke to patients' daughter. She made an appointment with urology and they stated patient needs to see Dr. Iqbal for a hernia. After reading Dr. Iqbal's notes, I explained that he does not have a hernia. She says he has blood when he wipes after having a bowel movement. Last colonoscopy was in June at [facility]. He did not have the bleeding then. I told her he should follow up with urology for his enlarged prostate. I will get the colonoscopy results from [facility] and see if Dr. Iqbal would recommend another colonoscopy.

## 2023-04-04 ENCOUNTER — TRANSFERRED RECORDS (OUTPATIENT)
Dept: HEALTH INFORMATION MANAGEMENT | Facility: CLINIC | Age: 63
End: 2023-04-04
Payer: COMMERCIAL

## 2023-04-27 ENCOUNTER — OFFICE VISIT (OUTPATIENT)
Dept: FAMILY MEDICINE | Facility: CLINIC | Age: 63
End: 2023-04-27
Attending: FAMILY MEDICINE
Payer: COMMERCIAL

## 2023-04-27 VITALS
DIASTOLIC BLOOD PRESSURE: 70 MMHG | BODY MASS INDEX: 23.23 KG/M2 | SYSTOLIC BLOOD PRESSURE: 106 MMHG | HEIGHT: 67 IN | OXYGEN SATURATION: 98 % | RESPIRATION RATE: 16 BRPM | HEART RATE: 93 BPM | WEIGHT: 148 LBS | TEMPERATURE: 97.4 F

## 2023-04-27 DIAGNOSIS — E55.9 VITAMIN D DEFICIENCY: ICD-10-CM

## 2023-04-27 DIAGNOSIS — M06.4 INFLAMMATORY POLYARTHROPATHY OF HAND (H): ICD-10-CM

## 2023-04-27 DIAGNOSIS — E78.5 DYSLIPIDEMIA: ICD-10-CM

## 2023-04-27 DIAGNOSIS — Z00.00 ROUTINE PHYSICAL EXAMINATION: Primary | ICD-10-CM

## 2023-04-27 DIAGNOSIS — K92.1 HEMATOCHEZIA: ICD-10-CM

## 2023-04-27 DIAGNOSIS — Z12.5 SCREENING FOR PROSTATE CANCER: ICD-10-CM

## 2023-04-27 DIAGNOSIS — N40.1 BPH WITH URINARY OBSTRUCTION: ICD-10-CM

## 2023-04-27 DIAGNOSIS — J82.89 PULMONARY EOSINOPHILIA (H): ICD-10-CM

## 2023-04-27 DIAGNOSIS — M1A.9XX0 CHRONIC GOUT WITHOUT TOPHUS, UNSPECIFIED CAUSE, UNSPECIFIED SITE: ICD-10-CM

## 2023-04-27 DIAGNOSIS — Z23 ENCOUNTER FOR IMMUNIZATION: ICD-10-CM

## 2023-04-27 DIAGNOSIS — N13.8 BPH WITH URINARY OBSTRUCTION: ICD-10-CM

## 2023-04-27 DIAGNOSIS — K21.9 GASTROESOPHAGEAL REFLUX DISEASE, UNSPECIFIED WHETHER ESOPHAGITIS PRESENT: ICD-10-CM

## 2023-04-27 DIAGNOSIS — K44.9 HIATAL HERNIA: ICD-10-CM

## 2023-04-27 PROBLEM — I77.1 HEPATIC ARTERY STENOSIS (H): Status: RESOLVED | Noted: 2022-10-27 | Resolved: 2023-04-27

## 2023-04-27 LAB
ERYTHROCYTE [DISTWIDTH] IN BLOOD BY AUTOMATED COUNT: 13.5 % (ref 10–15)
HCT VFR BLD AUTO: 42.8 % (ref 40–53)
HGB BLD-MCNC: 14.6 G/DL (ref 13.3–17.7)
MCH RBC QN AUTO: 29.7 PG (ref 26.5–33)
MCHC RBC AUTO-ENTMCNC: 34.1 G/DL (ref 31.5–36.5)
MCV RBC AUTO: 87 FL (ref 78–100)
PLATELET # BLD AUTO: 145 10E3/UL (ref 150–450)
RBC # BLD AUTO: 4.92 10E6/UL (ref 4.4–5.9)
WBC # BLD AUTO: 6 10E3/UL (ref 4–11)

## 2023-04-27 PROCEDURE — 85027 COMPLETE CBC AUTOMATED: CPT | Performed by: FAMILY MEDICINE

## 2023-04-27 PROCEDURE — 99213 OFFICE O/P EST LOW 20 MIN: CPT | Mod: 25 | Performed by: FAMILY MEDICINE

## 2023-04-27 PROCEDURE — 99396 PREV VISIT EST AGE 40-64: CPT | Mod: 25 | Performed by: FAMILY MEDICINE

## 2023-04-27 PROCEDURE — 90750 HZV VACC RECOMBINANT IM: CPT | Performed by: FAMILY MEDICINE

## 2023-04-27 PROCEDURE — 36415 COLL VENOUS BLD VENIPUNCTURE: CPT | Performed by: FAMILY MEDICINE

## 2023-04-27 PROCEDURE — 90471 IMMUNIZATION ADMIN: CPT | Performed by: FAMILY MEDICINE

## 2023-04-27 PROCEDURE — 84550 ASSAY OF BLOOD/URIC ACID: CPT | Performed by: FAMILY MEDICINE

## 2023-04-27 PROCEDURE — 80061 LIPID PANEL: CPT | Performed by: FAMILY MEDICINE

## 2023-04-27 PROCEDURE — 80053 COMPREHEN METABOLIC PANEL: CPT | Performed by: FAMILY MEDICINE

## 2023-04-27 PROCEDURE — G0103 PSA SCREENING: HCPCS | Performed by: FAMILY MEDICINE

## 2023-04-27 RX ORDER — FINASTERIDE 5 MG/1
5 TABLET, FILM COATED ORAL DAILY
Qty: 90 TABLET | Refills: 3 | COMMUNITY
Start: 2023-04-27

## 2023-04-27 ASSESSMENT — ENCOUNTER SYMPTOMS
PARESTHESIAS: 0
FEVER: 0
ABDOMINAL PAIN: 0
JOINT SWELLING: 0
MYALGIAS: 1
FREQUENCY: 0
SORE THROAT: 0
COUGH: 0
EYE PAIN: 0
HEADACHES: 0
DIARRHEA: 0
NAUSEA: 0
CONSTIPATION: 1
SHORTNESS OF BREATH: 0
ARTHRALGIAS: 1
DIZZINESS: 0
DYSURIA: 0
HEMATOCHEZIA: 0
WEAKNESS: 0
HEARTBURN: 0
HEMATURIA: 0
NERVOUS/ANXIOUS: 0
PALPITATIONS: 0
CHILLS: 0

## 2023-04-27 ASSESSMENT — PAIN SCALES - GENERAL: PAINLEVEL: NO PAIN (0)

## 2023-04-27 NOTE — PROGRESS NOTES
Assessment/Plan:     Routine physical examination  Routine healthcare maintenance.  Preventative cares reviewed.  Annual physical exams to continue.  Prior colonoscopy July 8, 2021 and told to repeat at 10-year interval.    Hematochezia  Prior concerns of hematochezia.  Unclear etiology.  Went between GI and urology.  Now doing better after initiation of finasteride 5 mg daily while continuing tamsulosin 0.4 mg using 2 tablets daily.  Patient with internal hemorrhoids on colonoscopy July 8, 2021.  Patient to discontinue aspirin 81 mg daily without history of diabetes, heart disease or stroke.  - CBC with platelets  - Comprehensive metabolic panel    BPH with urinary obstruction  BPH with urinary obstruction improved with finasteride 5 mg daily which was recently added while continuing tamsulosin 0.4 mg using 2 capsules (0.8 mg) daily.  - finasteride (PROSCAR) 5 MG tablet  Dispense: 90 tablet; Refill: 3    Inflammatory polyarthropathy of hand (H)  History of inflammatory polyarthropathy of hand and appears to be doing well currently.  Continues colchicine 0.6 mg daily with question of gout history noted    Pulmonary eosinophilia (H)  Denies current concerns for respiratory difficulties etc.  Stable findings.    Gastroesophageal reflux disease, unspecified whether esophagitis present  Using pantoprazole 40 mg daily.  Sucralfate 1 g 4 times daily.  No longer on famotidine.  - Comprehensive metabolic panel    Vitamin D deficiency  Utilizing vitamin D supplement 2000 units daily and will ensure adequate replacement.    Hiatal hernia  Described history of hiatal hernia on upper endoscopy November 25, 2022.  History of atypical chest pain.  Using pantoprazole 40 mg daily plus sucralfate 1 g 4 times daily.    Chronic gout without tophus, unspecified cause, unspecified site  Check uric acid as well as renal function and CBC.  Continues use of colchicine 0.6 mg daily.  - Uric acid  - CBC with platelets  - Comprehensive  "metabolic panel    Encounter for immunization  Shingrix immunization provided with booster anticipated at follow-up exam in 6 months.  - ZOSTER VACCINE RECOMBINANT ADJUVANTED (SHINGRIX)    Dyslipidemia  Check lipid cascade today while fasting  - Lipid panel reflex to direct LDL Fasting    Screening for prostate cancer  PSA for prostate cancer screening.  - Prostate Specific Antigen Screen             Subjective:     Nesha Newton is a 62 year old male who presents for an annual exam.  In general doing well.  Had episode of hematochezia.  Was seen both with gastroenterology as well as urology apparently.  States that symptoms resolved following initiation of finasteride 5 mg daily which may have been related to prostate etiology per specialist.  Continues tamsulosin 0.8 mg daily as well.  Did have internal hemorrhoids on colonoscopy 2021 and told to repeat otherwise in 10 years for colon cancer screening.  Had upper endoscopy 2022 with hiatal hernia.  Continues use of pantoprazole 40 mg daily plus sucralfate 1 g 4 times daily.  Vitamin D deficiency utilizing 2000 units vitamin D supplement daily.  Denies recent gout attacks.  Dyslipidemia historically.  Comprehensive review of systems as above otherwise all negative.       - Lachi   3 daughters (Malgorzata, Margarita, Michelle)   Originally from [facility] - kicked out of [facility]...   Tobacco:  never   EtOH:  never   Mom - Clementine -  age 83 (2017)   Dad - Julio (101 years old)   3 bros - Deyvi   2 sis -   Surgeries:  left knee surgery (\"lacks fluid\" and still received injections)   Hospitalizations:  none   Work:  personal care assistant and  in [place]   Hobbies:  watching soccer always (routes for [employer] otherwise [place] in World [place] if supporting a country...)      Healthy Habits:     Getting at least 3 servings of Calcium per day:  Yes    Bi-annual eye exam:  Yes    Dental care twice a year:  NO    Sleep apnea or " symptoms of sleep apnea:  None    Diet:  Low salt and Low fat/cholesterol    Frequency of exercise:  2-3 days/week    Duration of exercise:  15-30 minutes    Taking medications regularly:  Yes    Medication side effects:  Other    PHQ-2 Total Score: 0    Additional concerns today:  No       Immunization History   Administered Date(s) Administered     COVID-19 Vaccine 12+ (Pfizer) 03/18/2021, 04/08/2021, 12/03/2021     Flu, Unspecified 12/13/2022     Hepatitis A Immunity: Titer/MD Dx 05/10/2017     Hepatitis B Immunity: Titer 04/28/2017     Influenza Vaccine 50-64 or 18-64 w/egg allergy (Flublok) 10/09/2018, 10/16/2019, 10/29/2020, 10/27/2022     Influenza Vaccine >6 months (Alfuria,Fluzone) 09/10/2015, 09/27/2017     Influenza Vaccine, 6+MO IM (QUADRIVALENT W/PRESERVATIVES) 10/06/2014, 09/28/2016     TDAP (Adacel,Boostrix) 02/13/2017     Typhoid IM 07/25/2022     Zoster recombinant adjuvanted (SHINGRIX) 04/27/2023     Immunization status: Shingrix immunization provided with booster anticipated at follow-up in 6 months.    Current Outpatient Medications   Medication Sig Dispense Refill     colchicine (COLCYRS) 0.6 MG tablet Take 1 tablet (0.6 mg) by mouth daily 90 tablet 3     docusate sodium (EQ STOOL SOFTENER) 100 MG capsule TAKE 1 CAPSULE BY MOUTH TWICE DAILY AS NEEDED 60 capsule 11     EQ SENNA-S 8.6-50 MG tablet TAKE 1 TABLET BY MOUTH TWICE DAILY AS NEEDED FOR CONSTIPATION 30 tablet 11     finasteride (PROSCAR) 5 MG tablet Take 1 tablet (5 mg) by mouth daily 90 tablet 3     pantoprazole (PROTONIX) 40 MG EC tablet Take 1 tablet (40 mg) by mouth daily 90 tablet 3     polyethylene glycol (MIRALAX) 17 GM/Dose powder TAKE 17 G BY MOUTH 2 TIMES DAILY 1020 g 3     sucralfate (CARAFATE) 1 GM tablet [SUCRALFATE (CARAFATE) 1 GRAM TABLET] 1 po four times a day before meals and at bedtime as needed for reflux 120 tablet 3     tamsulosin (FLOMAX) 0.4 MG capsule Take 2 capsules (0.8 mg) by mouth daily       VITAMIN D3 50 MCG  (2000 UT) tablet Take 1 tablet (50 mcg) by mouth daily 90 tablet 3     Past Medical History:   Diagnosis Date     Chronic gastritis without bleeding, unspecified gastritis type 4/3/2018     Coccidioidomycosis, unspecified     Created by Conversion      GERD (gastroesophageal reflux disease)      Hepatic steatosis 4/24/2017    HCV neg,      Osteoarthrosis involving lower leg     Created by Conversion  Replacement Utility updated for latest IMO load     Polyarthralgia 4/5/2017     Vitamin D deficiency      No past surgical history on file.  Tramadol and Primidone  Family History   Problem Relation Age of Onset     Diabetes Mother      Social History     Socioeconomic History     Marital status:      Spouse name: Not on file     Number of children: Not on file     Years of education: Not on file     Highest education level: Not on file   Occupational History     Not on file   Tobacco Use     Smoking status: Never     Smokeless tobacco: Never   Vaping Use     Vaping status: Not on file   Substance and Sexual Activity     Alcohol use: No     Drug use: No     Sexual activity: Not on file   Other Topics Concern     Not on file   Social History Narrative     Not on file     Social Determinants of Health     Financial Resource Strain: Not on file   Food Insecurity: Not on file   Transportation Needs: Not on file   Physical Activity: Not on file   Stress: Not on file   Social Connections: Not on file   Intimate Partner Violence: Not on file   Housing Stability: Not on file       Review of Systems  Comprehensive ROS: as above, otherwise all negative.           NM MPI with Lesiscan 9/9/22  Result Text     The nuclear stress test is negative for inducible myocardial ischemia or infarction.     The left ventricular ejection fraction at stress is greater than 70%.     The patient is at a low risk of future cardiac ischemic events.     The  images demonstrate breast attenuation.     There is no prior study for  "comparison.        EXAM: CTA CHEST ABDOMEN PELVIS W CONTRAST  LOCATION: [facility]  DATE/TIME: 8/29/2022 6:54 PM     INDICATION: Chest pain, with radiation between shoulders.  COMPARISON: 05/01/2022 CT abdomen pelvis.  TECHNIQUE: CT angiogram chest abdomen pelvis during arterial phase of injection of IV contrast. 2D and 3D MIP reconstructions were performed by the CT technologist. Dose reduction techniques were used.   CONTRAST: ISOVUE 370 100mL     FINDINGS:   CT ANGIOGRAM CHEST, ABDOMEN, AND PELVIS: Thoracic abdominal aorta negative for aneurysm or dissection. No pulmonary embolism. Celiac axis, Superior mesenteric arteries are patent. Solitary patent renal arteries without stenosis.     LUNGS AND PLEURA: Normal.     MEDIASTINUM/AXILLAE: Normal.     CORONARY ARTERY CALCIFICATION: None.     HEPATOBILIARY: Normal.     PANCREAS: Normal.     SPLEEN: Normal.     ADRENAL GLANDS: Normal.     KIDNEYS/BLADDER: Normal.     BOWEL: Normal appendix.      LYMPH NODES: Normal.     PELVIC ORGANS: Prostatic enlargement.     MUSCULOSKELETAL: Normal.                                                                      IMPRESSION:  1.  No pulmonary embolism.     2.  Thoracic and abdominal aorta negative for aneurysm or dissection.     3.  Normal appendix  .  4.  Prostatic enlargement.     5.  Constipation.      Objective:     /70   Pulse 93   Temp 97.4  F (36.3  C)   Resp 16   Ht 1.689 m (5' 6.5\")   Wt 67.1 kg (148 lb)   SpO2 98%   BMI 23.53 kg/m    Body mass index is 23.53 kg/m .    Physical    General Appearance:    Alert, cooperative, no distress, appears stated age.     Head:    Normocephalic, without obvious abnormality, atraumatic   Eyes:    PERRL, conjunctiva/corneas clear, EOM's intact, fundi     benign, both eyes        Ears:    Normal TM's and external ear canals, both ears   Nose:   Nares normal, septum midline, mucosa normal, no drainage    or sinus tenderness   Throat:   Lips, " mucosa, and tongue normal; teeth and gums normal   Neck:   Supple, symmetrical, trachea midline, no adenopathy;        thyroid:  No enlargement/tenderness/nodules; no carotid    bruit or JVD   Back:     Symmetric, no curvature, ROM normal, no CVA tenderness   Lungs:     Clear to auscultation bilaterally, respirations unlabored   Chest wall:    No tenderness or deformity   Heart:    Regular rate and rhythm, S1 and S2 normal, no murmur, rub   or gallop   Abdomen:     Soft, non-tender, bowel sounds active all four quadrants,     no masses, no organomegaly.     Genitalia:    Normal male without lesion, discharge or tenderness.  No inguinal hernia noted.  Wearing absorbent pull-up.   Rectal:    Normal tone.  Prostate mildly enlarged otherwise symmetric, no masses or tenderness.   Extremities:   Extremities normal, atraumatic, no cyanosis or edema   Pulses:   2+ and symmetric all extremities   Skin:   Skin color, texture, turgor normal, no rashes or lesions   Lymph nodes:   Cervical, supraclavicular, and axillary nodes normal   Neurologic:   CNII-XII intact. Normal strength, sensation and reflexes       throughout                This note has been dictated using voice recognition software and as a result may contain minor grammatical errors and unintended word substitutions.         Answers for HPI/ROS submitted by the patient on 4/27/2023  Frequency of exercise:: 2-3 days/week  Getting at least 3 servings of Calcium per day:: Yes  Diet:: Low salt, Low fat/cholesterol  Taking medications regularly:: Yes  Medication side effects:: Other  Bi-annual eye exam:: Yes  Dental care twice a year:: NO  Sleep apnea or symptoms of sleep apnea:: None  abdominal pain: No  Blood in stool: No  Blood in urine: No  chest pain: No  chills: No  congestion: No  constipation: Yes  cough: No  diarrhea: No  dizziness: No  ear pain: No  eye pain: No  nervous/anxious: No  fever: No  frequency: No  genital sores: No  headaches: No  hearing loss:  No  heartburn: No  arthralgias: Yes  joint swelling: No  peripheral edema: No  mood changes: No  myalgias: Yes  nausea: No  dysuria: No  palpitations: No  Skin sensation changes: No  sore throat: No  urgency: No  rash: No  shortness of breath: No  visual disturbance: No  weakness: No  Additional concerns today:: No  Duration of exercise:: 15-30 minutes

## 2023-04-28 LAB
ALBUMIN SERPL BCG-MCNC: 4.7 G/DL (ref 3.5–5.2)
ALP SERPL-CCNC: 64 U/L (ref 40–129)
ALT SERPL W P-5'-P-CCNC: 25 U/L (ref 10–50)
ANION GAP SERPL CALCULATED.3IONS-SCNC: 10 MMOL/L (ref 7–15)
AST SERPL W P-5'-P-CCNC: 17 U/L (ref 10–50)
BILIRUB SERPL-MCNC: 0.5 MG/DL
BUN SERPL-MCNC: 12.8 MG/DL (ref 8–23)
CALCIUM SERPL-MCNC: 9.6 MG/DL (ref 8.8–10.2)
CHLORIDE SERPL-SCNC: 102 MMOL/L (ref 98–107)
CHOLEST SERPL-MCNC: 197 MG/DL
CREAT SERPL-MCNC: 1.13 MG/DL (ref 0.67–1.17)
DEPRECATED HCO3 PLAS-SCNC: 28 MMOL/L (ref 22–29)
GFR SERPL CREATININE-BSD FRML MDRD: 74 ML/MIN/1.73M2
GLUCOSE SERPL-MCNC: 94 MG/DL (ref 70–99)
HDLC SERPL-MCNC: 41 MG/DL
LDLC SERPL CALC-MCNC: 123 MG/DL
NONHDLC SERPL-MCNC: 156 MG/DL
POTASSIUM SERPL-SCNC: 4.2 MMOL/L (ref 3.4–5.3)
PROT SERPL-MCNC: 7.8 G/DL (ref 6.4–8.3)
PSA SERPL DL<=0.01 NG/ML-MCNC: 0.61 NG/ML (ref 0–4.5)
SODIUM SERPL-SCNC: 140 MMOL/L (ref 136–145)
TRIGL SERPL-MCNC: 165 MG/DL
URATE SERPL-MCNC: 5 MG/DL (ref 3.4–7)

## 2023-06-14 DIAGNOSIS — K59.00 CONSTIPATION, UNSPECIFIED CONSTIPATION TYPE: ICD-10-CM

## 2023-06-15 RX ORDER — DOCUSATE SODIUM 100 MG/1
CAPSULE, LIQUID FILLED ORAL
Qty: 60 CAPSULE | Refills: 10 | Status: SHIPPED | OUTPATIENT
Start: 2023-06-15

## 2023-06-15 NOTE — TELEPHONE ENCOUNTER
"    Last Written Prescription Date:  5/25/22  Last Fill Quantity: 60,  # refills: 11   Last office visit provider:  4/27/23     Requested Prescriptions   Pending Prescriptions Disp Refills     docusate sodium (EQ STOOL SOFTENER) 100 MG capsule [Pharmacy Med Name: EQ Stool Softener 100 MG Oral Capsule] 60 capsule 0     Sig: TAKE 1 CAPSULE BY MOUTH TWICE DAILY AS NEEDED       Laxatives Protocol Failed - 6/14/2023  2:39 PM        Failed - Recent (12 mo) or future (30 days) visit within the authorizing provider's specialty     Patient has had an office visit with the authorizing provider or a provider within the authorizing providers department within the previous 12 mos or has a future within next 30 days. See \"Patient Info\" tab in inbasket, or \"Choose Columns\" in Meds & Orders section of the refill encounter.              Passed - Patient is age 6 or older        Passed - Medication is active on med list             Angelica Montes RN 06/15/23 1:40 PM  "

## 2023-06-20 NOTE — PROGRESS NOTES
Assessment/ Plan  1. Thrombosed external hemorrhoid  Tender with some bleeding    I have generally incised and drained these in the clinic.  However, today without an in person  (interpretation done over the phone via language line and was somewhat difficult) think it best that he be referred and follow-up with colorectal surgery with in person     MiraLAX prescribed, will stop aspirin, continue Colace, Tylenol for pain  - Ambulatory referral to Colorectal Surgery  - HM2(CBC w/o Differential)  - INR    2. Rectal bleeding  We will check INR and CBC      Subjective  HPI  Red Blood per rectum  Narrative: Bleeding with 2 stools yesterday  --------------------   Passing blood mixed with stool - soft- not constipation  Duration/ Frequency/ Number of episodes: since yest- 2 episodes  Most recent episode: yest afternoon  Severity/ amount of blood not that much  Abdominal pain? No, just sounds  Anal pain? Mild pain has had for a while and feels a bulge when he sits down  Other symptoms:   Lightheadedness/ dizziness? no  Fever/ Chills? no  Similar symptoms previously?   History of inflammatory polymyopathy of hand, on Plaquenil.  Takes aspirin  Also has adenomatous polyp history  History of constipiation  Current Outpatient Prescriptions on File Prior to Visit   Medication Sig     aspirin 81 MG EC tablet Take 1 tablet (81 mg total) by mouth daily.     cholecalciferol, vitamin D3, 2,000 unit Tab 1 po qd     docusate sodium (COLACE) 100 MG capsule Take 1 capsule (100 mg total) by mouth daily as needed for constipation.     famotidine (PEPCID) 20 MG tablet Take 1 tablet (20 mg total) by mouth 2 (two) times a day.     hydroxychloroquine (PLAQUENIL) 200 mg tablet TAKE ONE TABLET BY MOUTH TWICE DAILY     No current facility-administered medications on file prior to visit.      Patient Active Problem List   Diagnosis     Coccidioidomycosis     Vitamin D Deficiency     Osteoarthritis Of The Knee     Chronic  Here is the note from today's visit. Please let me know if you have any questions. Ame "Eosinophilic Pneumonia     Right upper quadrant abdominal pain     GERD (gastroesophageal reflux disease)     Polyarthralgia     Hepatic steatosis     Inflammatory polyarthropathy of hand     Adenomatous colon polyp     No past surgical history on file.  Family History   Problem Relation Age of Onset     Diabetes Mother        ROS  As listed above     Objective  Physical Exam  Vitals:    12/14/17 0823   BP: 112/68   Pulse: 78   Resp: 20   SpO2: 95%   Weight: 163 lb (73.9 kg)   Height: 5' 7\" (1.702 m)     Abdomen is soft without significant tenderness    Rectal exam shows 1-1/2 cm beefy lobulated erythematous lesion which is fairly firm at 12:00.  Question initially as to whether this was a prolapsed internal or an external hemorrhoid.  Is quite tender making me think it is an external hemorrhoid.  I cannot do a high quality rectal exam to explore this further due to patient's discomfort.    Labs are pending  Please note: Voice recognition software was used in this dictation.  It may therefore contain typographical errors.      "

## 2023-08-29 ENCOUNTER — TRANSFERRED RECORDS (OUTPATIENT)
Dept: HEALTH INFORMATION MANAGEMENT | Facility: CLINIC | Age: 63
End: 2023-08-29
Payer: COMMERCIAL

## 2023-09-05 ENCOUNTER — TRANSFERRED RECORDS (OUTPATIENT)
Dept: HEALTH INFORMATION MANAGEMENT | Facility: CLINIC | Age: 63
End: 2023-09-05
Payer: COMMERCIAL

## 2023-09-13 DIAGNOSIS — K59.00 CONSTIPATION, UNSPECIFIED CONSTIPATION TYPE: ICD-10-CM

## 2023-09-13 RX ORDER — POLYETHYLENE GLYCOL 3350 17 G/17G
POWDER, FOR SOLUTION ORAL
Qty: 1020 G | Refills: 0 | Status: SHIPPED | OUTPATIENT
Start: 2023-09-13

## 2023-09-15 DIAGNOSIS — R07.9 CHEST PAIN, UNSPECIFIED TYPE: ICD-10-CM

## 2023-09-15 RX ORDER — COLCHICINE 0.6 MG/1
0.6 TABLET ORAL DAILY
Qty: 90 TABLET | Refills: 3 | Status: SHIPPED | OUTPATIENT
Start: 2023-09-15 | End: 2023-10-12

## 2023-09-15 NOTE — TELEPHONE ENCOUNTER
Pharmacy is requesting refill for Colchicine 1.6MG tabs. Sig to read take one tablet by mouth daily.     Medication is not on med list. Please advise.

## 2023-09-15 NOTE — TELEPHONE ENCOUNTER
Routing refill request to provider for review/approval because:  Drug not active on patient's medication list    Pending Prescriptions:                       Disp   Refills    colchicine (COLCRYS) 0.6 MG tablet        90 tab*3            Sig: Take 1 tablet (0.6 mg) by mouth daily    Last Written Prescription Date:  8/31/22  Last Fill Quantity: 90,  # refills: 3   Last office visitwith prescribing provider: 4/27/2023   Future Office Visit:   Appointments in Next Year      Oct 27, 2023  8:40 AM  (Arrive by 8:20 AM)  Provider Visit with Aroldo Michaud MD  [facility] ([facility]) [phone]             THERESA EidN, RN  [facility]

## 2023-10-12 DIAGNOSIS — R07.9 CHEST PAIN, UNSPECIFIED TYPE: ICD-10-CM

## 2023-10-12 DIAGNOSIS — K21.9 GASTROESOPHAGEAL REFLUX DISEASE, UNSPECIFIED WHETHER ESOPHAGITIS PRESENT: ICD-10-CM

## 2023-10-12 RX ORDER — PANTOPRAZOLE SODIUM 40 MG/1
40 TABLET, DELAYED RELEASE ORAL DAILY
Qty: 90 TABLET | Refills: 3 | Status: SHIPPED | OUTPATIENT
Start: 2023-10-12

## 2023-10-12 RX ORDER — SUCRALFATE 1 G/1
TABLET ORAL
Qty: 360 TABLET | Refills: 3 | Status: SHIPPED | OUTPATIENT
Start: 2023-10-12

## 2023-10-12 RX ORDER — COLCHICINE 0.6 MG/1
0.6 TABLET ORAL DAILY
Qty: 90 TABLET | Refills: 3 | Status: SHIPPED | OUTPATIENT
Start: 2023-10-12

## 2024-01-10 ENCOUNTER — PATIENT OUTREACH (OUTPATIENT)
Dept: GASTROENTEROLOGY | Facility: CLINIC | Age: 64
End: 2024-01-10
Payer: COMMERCIAL

## 2024-07-21 ENCOUNTER — HEALTH MAINTENANCE LETTER (OUTPATIENT)
Age: 64
End: 2024-07-21

## 2025-08-10 ENCOUNTER — HEALTH MAINTENANCE LETTER (OUTPATIENT)
Age: 65
End: 2025-08-10